# Patient Record
Sex: FEMALE | Race: WHITE | Employment: OTHER | URBAN - METROPOLITAN AREA
[De-identification: names, ages, dates, MRNs, and addresses within clinical notes are randomized per-mention and may not be internally consistent; named-entity substitution may affect disease eponyms.]

---

## 2017-02-10 NOTE — TELEPHONE ENCOUNTER
Pt would like 30 day supply send her RX to  . Sheryl Vasquez 22, she is out of town and she only has a few left. You can reach her at the number she left.

## 2017-02-13 RX ORDER — LEVOTHYROXINE SODIUM 150 UG/1
TABLET ORAL
Qty: 30 TAB | Refills: 3 | Status: CANCELLED | OUTPATIENT
Start: 2017-02-13

## 2017-02-14 RX ORDER — LEVOTHYROXINE SODIUM 150 UG/1
TABLET ORAL
Qty: 30 TAB | Refills: 0 | Status: SHIPPED | OUTPATIENT
Start: 2017-02-14 | End: 2017-03-13 | Stop reason: SDUPTHER

## 2017-02-14 NOTE — TELEPHONE ENCOUNTER
Patient only has medicine through Sunday. She is still out of town and won't be back into town until end of March. She would like a 30 day supply called into [facility] instead of having it mailed.

## 2017-03-13 ENCOUNTER — OFFICE VISIT (OUTPATIENT)
Dept: INTERNAL MEDICINE CLINIC | Age: 82
End: 2017-03-13

## 2017-03-13 ENCOUNTER — HOSPITAL ENCOUNTER (OUTPATIENT)
Dept: LAB | Age: 82
Discharge: HOME | End: 2017-03-13
Payer: MEDICARE

## 2017-03-13 VITALS
BODY MASS INDEX: 33.21 KG/M2 | HEIGHT: 70 IN | HEART RATE: 81 BPM | SYSTOLIC BLOOD PRESSURE: 130 MMHG | RESPIRATION RATE: 12 BRPM | TEMPERATURE: 97.9 F | DIASTOLIC BLOOD PRESSURE: 68 MMHG | WEIGHT: 232 LBS | OXYGEN SATURATION: 96 %

## 2017-03-13 DIAGNOSIS — Z78.0 ASYMPTOMATIC MENOPAUSAL STATE: ICD-10-CM

## 2017-03-13 DIAGNOSIS — Z23 NEED FOR ZOSTER VACCINATION: ICD-10-CM

## 2017-03-13 DIAGNOSIS — E03.9 HYPOTHYROIDISM, UNSPECIFIED TYPE: ICD-10-CM

## 2017-03-13 DIAGNOSIS — G89.29 CHRONIC RIGHT SHOULDER PAIN: ICD-10-CM

## 2017-03-13 DIAGNOSIS — Z00.00 ROUTINE GENERAL MEDICAL EXAMINATION AT A HEALTH CARE FACILITY: ICD-10-CM

## 2017-03-13 DIAGNOSIS — M17.0 OSTEOARTHRITIS OF BOTH KNEES, UNSPECIFIED OSTEOARTHRITIS TYPE: ICD-10-CM

## 2017-03-13 DIAGNOSIS — M25.511 CHRONIC RIGHT SHOULDER PAIN: ICD-10-CM

## 2017-03-13 DIAGNOSIS — Z00.00 MEDICARE ANNUAL WELLNESS VISIT, INITIAL: Primary | ICD-10-CM

## 2017-03-13 DIAGNOSIS — Z13.39 SCREENING FOR ALCOHOLISM: ICD-10-CM

## 2017-03-13 DIAGNOSIS — I50.32 CHRONIC DIASTOLIC HEART FAILURE (HCC): ICD-10-CM

## 2017-03-13 DIAGNOSIS — Z13.31 SCREENING FOR DEPRESSION: ICD-10-CM

## 2017-03-13 DIAGNOSIS — Z71.89 ADVANCED CARE PLANNING/COUNSELING DISCUSSION: ICD-10-CM

## 2017-03-13 PROCEDURE — 84443 ASSAY THYROID STIM HORMONE: CPT

## 2017-03-13 PROCEDURE — 36415 COLL VENOUS BLD VENIPUNCTURE: CPT

## 2017-03-13 PROCEDURE — 80048 BASIC METABOLIC PNL TOTAL CA: CPT

## 2017-03-13 PROCEDURE — 84439 ASSAY OF FREE THYROXINE: CPT

## 2017-03-13 RX ORDER — LEVOTHYROXINE SODIUM 150 UG/1
TABLET ORAL
Qty: 30 TAB | Refills: 0 | Status: SHIPPED | OUTPATIENT
Start: 2017-03-13 | End: 2017-03-23 | Stop reason: SDUPTHER

## 2017-03-13 RX ORDER — DICLOFENAC SODIUM 10 MG/G
4 GEL TOPICAL 4 TIMES DAILY
Qty: 100 G | Refills: 11 | Status: SHIPPED | OUTPATIENT
Start: 2017-03-13 | End: 2018-09-07

## 2017-03-13 NOTE — PROGRESS NOTES
Patient states she is here to follow up on her thyroid. Been on the new dose of 1/2 tab Sundays, one other days for about 6 weeks. Got letter late. Needs a refill. Still with right shoulder pain and feet edema.

## 2017-03-13 NOTE — PATIENT INSTRUCTIONS
Medicare Part B Preventive Services Guidelines/Limitations Date last completed and Frequency Due Date   Bone Mass Measurement  (age 72 & older, biennial) Requires diagnosis related to osteoporosis or estrogen deficiency. Biennial benefit unless patient has history of long-term glucocorticoid tx or baseline is needed because initial test was by other method As recommended by your PCP or Specialist    Recommended every 2 years As recommended by your PCP or Specialist     Cardiovascular Screening Blood Tests (every 5 years)  Total cholesterol, HDL, Triglycerides Order as a panel if possible Completed 11/2016    As recommended by your PCP As recommended by your PCP or Specialist   Colorectal Cancer Screening  -Fecal occult blood test (annual)  -Flexible sigmoidoscopy (5y)  -Screening colonoscopy (10y)  -Barium Enema Age 49-80; After age [de-identified] if history of abnormal results As recommended by your PCP or Specialist       As recommended by your PCP or Specialist     Counseling to Prevent Tobacco Use (up to 8 sessions per year)  - Counseling greater than 3 and up to 10 minutes  - Counseling greater than 10 minutes Patients must be asymptomatic of tobacco-related conditions to receive as preventive service N/A N/A   Diabetes Screening Tests (at least every 3 years, Medicare covers annually or at 6-month intervals for prediabetic patients)    Fasting blood sugar (FBS) or glucose tolerance test (GTT) Patient must be diagnosed with one of the following:  -Hypertension, Dyslipidemia, obesity, previous impaired FBS or GTT  Or any two of the following: overweight, FH of diabetes, age ? 72, history of gestational diabetes, birth of baby weighing more than 9 pounds Completed 11/2016    Recommended every 3 years for non-diabetics     As recommended by your PCP or Specialist     Glaucoma Screening (no USPSTF recommendation) Diabetes mellitus, family history, , age 48 or over,  American, age 72 or over Completed 10/2016    Recommended annually As recommended by your PCP or Specialist   Seasonal Influenza Vaccination (annually)  Completed 11/2016    Recommended Annually Completed for 2016 flu season   TDAP Vaccination  As recommended by your PCP or Specialist     Recommended every 10 years As recommended by your PCP or Specialist   Zoster (Shingles) Vaccination Covered by Medicare Part D through the pharmacy- PCP provides prescription Never received    Recommended once over age 48  As recommended by your PCP or Specialist   Pneumococcal Vaccination (once after 72)  Pneumo 23- 10/2009  Recommended once over the age of 72    Prevnar 15- 12/2015 Recommended once over the age of 72 Complete        Complete   Screening Mammography (biennial age 54-69) Annually (age 36 or over) As recommended by your PCP or Specialist   As recommended by your PCP or Specialist     Screening Pap Tests and Pelvic Examination (up to age 79 and after 79 if unknown history or abnormal study last 8 years) Every 25 months except high risk As recommended by your PCP or Specialist   As recommended by your PCP or Specialist     Ultrasound Screening for Abdominal Aortic Aneurysm (AAA) (once) Patient must be referred through IPPE and not have had a screening for abdominal aortic aneurysm before under Medicare. Limited to patients who meet one of the following criteria:  - Men who are 73-68 years old and have smoked more than 100 cigarettes in their lifetime.  -Anyone with a FH of AAA  -Anyone recommended for screening by USPSTF Not indicated unless recommended by PCP   Not indicated unless recommended by PCP     Family Practice Management 2011    Please bring a copy of your completed advance medical directive to the office so it may be added to your medical record. Thank you. If you have any questions or concerns please feel free to contact me at [phone]. It was a pleasure meeting you today and participating in your healthcare.   Sarita Moore RN

## 2017-03-13 NOTE — PROGRESS NOTES
Nurse Navigator Medicare Wellness Visit performed by ELÍAS Xavier    This is an Initial Leon Exam (AWV) (Performed 12 months after IPPE or effective date of Medicare Part B enrollment, Once in a lifetime)    I have reviewed the patient's medical history in detail and updated the computerized patient record. History     Past Medical History:   Diagnosis Date    Anemia     hx b12 def age 22-31s    COPD (chronic obstructive pulmonary disease) ([facility] [address] 7/2011    FEV1 1 L 7/2011. Dr. Mihaela Brown Diastolic dysfunction 1/23/2400    GERD (gastroesophageal reflux disease)     Glaucoma suspect of both eyes     Dr Mahin Rodas    Hemorrhoids     Migraine     Mixed stress and urge urinary incontinence     OA (osteoarthritis) of knee     R, Dr. Blair Box. Euflexa injections x3. 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 Jessica Elias Paresthesia of foot, bilateral     Psoriasis     left ear    PSVT (paroxysmal supraventricular tachycardia) ([facility] [address] 5/19/2012    Dr. Teresa Lopez Pulmonary nodules 7/2011    RLL x2.  stable 2/2012  Dr. 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  Dr. Drake Mandel    menorrhagia    HX OTHER SURGICAL      Echo 7/31/14 - LVEF 60-65%, grade 1 DD    HX TONSILLECTOMY      NM CARDIAC SPECT W STRS/REST MULT  7/18/11    small sized fixed defect in inferolateral wall may represent scar or abd attenuation; EF 80%    VAS LOWER EXT ART PVR MULT LEVEL SEG PRESSURES  7/18/11    normal Current Outpatient Prescriptions   Medication Sig Dispense Refill    diclofenac (VOLTAREN) 1 % gel Apply 4 g to affected area four (4) times daily. 100 g 11    levothyroxine (SYNTHROID) 150 mcg tablet TAKE 1 TABLET BY MOUTH 6 days per week and 1/2 pill on Sundays only 30 Tab 0    varicella zoster vacine live (VARICELLA-ZOSTER VACINE LIVE) 19,400 unit/0.65 mL susr injection 1 Vial by SubCUTAneous route once for 1 dose. 0.65 mL 0    metoprolol succinate (TOPROL-XL) 50 mg XL tablet Take 1 Tab by mouth daily. 90 Tab 1    torsemide (DEMADEX) 10 mg tablet Take 1 Tab by mouth daily. 90 Tab 1    nystatin (MYCOSTATIN) powder Apply once daily to rash of abdomen 30 g 5    budesonide-formoterol (SYMBICORT) 160-4.5 mcg/actuation HFA inhaler Take 2 Puffs by inhalation two (2) times a day. 3 Inhaler 5    docusate sodium (COLACE) 100 mg capsule Take 1 capsule by mouth two (2) times daily as needed for Constipation. 60 capsule 2    hydrocortisone (SCALPICIN) 1 % soln Apply daily 105 mL 5    albuterol (PROVENTIL HFA) 90 mcg/actuation inhaler Take 2 Puffs by inhalation every four (4) hours as needed for Wheezing. 1 Inhaler 5    ANTIOX #8/OM3/DHA/EPA/LUT/ZEAX (PRESERVISION AREDS 2 PO) Take  by mouth.  triamcinolone acetonide (KENALOG) 0.1 % topical cream Apply  to affected area two (2) times a day. use thin layer 30 g 3    aspirin delayed-release (ASPIR-81) 81 mg tablet Take  by mouth daily.        Allergies   Allergen Reactions    Ciprofloxacin (Bulk) Rash    Diltiazem Rash    Piperacillin Other (comments)    Tazobactam Other (comments)     Family History   Problem Relation Age of Onset    Cancer Maternal Grandmother      breast    Cancer Sister      lymphopma    Heart Disease Father     Heart Disease Paternal Grandfather     Seizures Sister     Arthritis-osteo Mother     Dementia Mother     Malignant Hyperthermia Neg Hx     Pseudocholinesterase Deficiency Neg Hx     Delayed Awakening Neg Hx     Post-op Nausea/Vomiting Neg Hx     Emergence Delirium Neg Hx     Post-op Cognitive Dysfunction Neg Hx     Other Neg Hx      Social History   Substance Use Topics    Smoking status: Former Smoker     Packs/day: 1.00     Years: 60.00     Types: Cigarettes     Quit date: 8/27/2011    Smokeless tobacco: Never Used    Alcohol use No     Patient Active Problem List   Diagnosis Code    Hypothyroidism E03.9    OA (osteoarthritis) of knee M17.9    Mixed stress and urge urinary incontinence N39.46    Tobacco use disorder F17.200    Psoriasis L40.9    Pulmonary nodules R91.8    Malaise R53.81    Anemia, unspecified D64.9    Chronic airway obstruction, not elsewhere classified ([facility] [address] J44.9    Glaucoma H40.9    Depressive disorder, not elsewhere classified F32.9    Migraine G43.909    Diverticulitis of colon (without mention of hemorrhage) K57.32    CKD (chronic kidney disease), stage III N18.3    PSVT (paroxysmal supraventricular tachycardia) ([facility]) I47.1    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 Patient states that they have a strong support system within their family & friends. PHQ 2 / 9, over the last two weeks 3/13/2017   Little interest or pleasure in doing things Not at all   Feeling down, depressed or hopeless Not at all   Total Score PHQ 2 0     Alcohol Risk Factor Screening: On any occasion during the past 3 months, have you had more than 3 drinks containing alcohol? No    Do you average more than 7 drinks per week? No    Functional Ability and Level of Safety:     Hearing Loss   normal-to-mild    Activities of Daily Living   Self-care.  Patient states that she lives alone in a private residence & she is independent in all ADL's. Patient shares that her sister & family live locally, but her own daughter & grandsons live in [place] & she attempts to visit with them as often as she can. Patient was pleased to share that she stayed with her daughter from Thanksgiving through January which was a good visit but very eventful. Patient ambulates with the assistance of a rolling walker or rollator. Patient states that she often uses her rollator as a seat in the kitchen while preparing meals or washing dishes. Patient states that she has knee trouble & gets swelling in her feet, so she tries to limit her time standing. Patient tries to stay active but cleaning her house, but states that she gets easily fatigued which she feels is due to some of her cardiac medications & her thyroid disorder. Patient states that the fatigue has been chronic & PCP is aware. Patient also shares that she is experiencing right shoulder pain. PCP notified & today, PCP provided patient with a referral to an orthopedic physician for evaluation of the shoulder. Requires assistance with:   ADL Assessment 3/13/2017   Feeding yourself No Help Needed   Getting from bed to chair No Help Needed   Getting dressed No Help Needed   Bathing or showering No Help Needed   Walk across the room (includes cane/walker) No Help Needed   Using the telphone No Help Needed   Taking your medications No Help Needed   Preparing meals No Help Needed   Managing money (expenses/bills) No Help Needed   Moderately strenuous housework (laundry) No Help Needed   Shopping for personal items (toiletries/medicines) No Help Needed   Shopping for groceries No Help Needed   Driving No Help Needed   Climbing a flight of stairs No Help Needed   Getting to places beyond walking distances No Help Needed       Fall Risk   Patient denies falls within the past year & verbalizes awareness of fall prevention strategies. Fall Risk Assessment, last 12 mths 3/13/2017   Able to walk?  Yes   Fall in past 12 months? No   Fall with injury? -     Abuse Screen   Patient is not abused    Review of Systems   Medicare Wellness Visit    Physical Examination     No exam data present    Evaluation of Cognitive Function:  Mood/affect:  happy  Appearance: age appropriate and casually dressed  Family member/caregiver input: None present; however, patient reports a strong support system. No exam performed today, Medicare Wellness Visit. Patient Care Team:  Hussein Rubio MD as PCP - General (Internal Medicine)  Marge Dumont MD (Cardiology)    Advice/Referrals/Counseling   Education and counseling provided:  End-of-Life planning (with patient's consent)  Pneumococcal Vaccine  Influenza Vaccine  Screening Mammography  Screening Pap and pelvic (covered once every 2 years)  Colorectal cancer screening tests  Bone mass measurement (DEXA)  Screening for glaucoma  tdap & shingles vaccinations      Assessment/Plan   1. Patient states that a completed Advanced Medical Directive is at home. NN encouraged patient to bring a copy of the completed Advanced Medical Directive to the office for scanning into the medical record. Patient verbalized understanding & agreement. 2. Patient is up to date on the following immunizations: flu vaccine (admin 11/2016), pneumonia 23 vaccine (admin 10/2009) & prevnar 13 vaccine (admin 12/2015). Patient confirmed the aforementioned preventative immunization dates are correct. Patient is unable to recall the date of their last tdap vaccine. NN encouraged patient to check home records & if information obtained, to please notify PCP's office with the details. Patient verbalized agreement. Patient denies receiving a shingles vaccine in the past & patient denies ever having a case of shingles in the past. Today, PCP provided patient with a prescription for the shingles vaccine. Patient's health maintenance immunization record has been updated & is current.      3. Due to the patient's age, screening mammograms (report on file from 10/2014) & screening colonoscopies are no longer indicated unless recommended by PCP or a specialist. Patient denies having a screening dexa scan in her past. Today, PCP provided patient with an order for a screening dexa scan. Patient inquiring about the health benefits of a [facility] Screening appointment - PCP notified to help patient determine if this would be beneficial.     4. Patient was not wearing corrective lenses. Patient reports having a routine eye exam & glaucoma screening within the last year (10/2016) performed by Dr. Ellsi Ayala. [facility] faxed requesting a copy of patient's last eye exam with glaucoma screening with patient's verbal approval.     5. Please see ADL screening section for additional information. Patient verbalized understanding of all information discussed. Patient was given the opportunity to ask questions. Medication reconciliation completed by MA/ LPN and reviewed by PCP. Patient provided AVS which includes Medicare Wellness Preventative Screening Table.

## 2017-03-13 NOTE — MR AVS SNAPSHOT
Visit Information Date & Time Provider Department Dept. Phone Encounter #  
 3/13/2017  1:30 PM Isiah Wary MD [facility] [address]  [id number] Your Appointments 5/4/2017  1:00 PM  
ESTABLISHED PATIENT with Loretta Corona MD  
[facility] ([address]) Appt Note: annual check 320 [facility] 600 1007 [facility]  
[address] [facility] [address] [address] Upcoming Health Maintenance Date Due DTaP/Tdap/Td series (1 - Tdap) 5/28/1953 GLAUCOMA SCREENING Q2Y 5/14/2016 MEDICARE YEARLY EXAM 3/14/2018 Allergies as of 3/13/2017  Review Complete On: 3/13/2017 By: Rolando Tran Severity Noted Reaction Type Reactions Ciprofloxacin (Bulk)  05/07/2012    Rash Diltiazem  06/15/2012    Rash Piperacillin  04/26/2013    Other (comments) Tazobactam  04/26/2013    Other (comments) Current Immunizations  Reviewed on 3/13/2017 Name Date Influenza High Dose Vaccine PF 11/17/2016, 12/16/2015 Influenza Vaccine 9/13/2014, 9/15/2013 Pneumococcal Conjugate (PCV-13) 12/16/2015 Pneumococcal Vaccine (Unspecified Type) 10/1/2009 Reviewed by Isiah Wray MD on 3/13/2017 at  2:17 PM  
You Were Diagnosed With   
  
 Codes Comments Hypothyroidism, unspecified type    -  Primary ICD-10-CM: E03.9 ICD-9-CM: 244.9 Osteoarthritis of both knees, unspecified osteoarthritis type     ICD-10-CM: M17.0 ICD-9-CM: 715.96 Chronic right shoulder pain     ICD-10-CM: M25.511, G89.29 ICD-9-CM: 719.41, 338.29 Need for zoster vaccination     ICD-10-CM: J03 ICD-9-CM: V04.89 Chronic diastolic heart failure (HCC)     ICD-10-CM: I50.32 
ICD-9-CM: 428.32 Asymptomatic menopausal state     ICD-10-CM: Z78.0 ICD-9-CM: V49.81 Vitals BP Pulse Temp Resp Height(growth percentile) Weight(growth percentile) 130/68 (BP 1 Location: Left arm, BP Patient Position: Sitting) 81 97.9 °F (36.6 °C) 12 5' 10\" (1.778 m) 232 lb (105.2 kg) SpO2 BMI OB Status Smoking Status 96% 33.29 kg/m2 Hysterectomy Former Smoker Vitals History BMI and BSA Data Body Mass Index Body Surface Area  
 33.29 kg/m 2 2.28 m 2 Preferred Pharmacy Pharmacy Name Phone [facility]/PHARMACY #[id number] - [place], [place] - [address] [address] [phone] Your Updated Medication List  
  
   
This list is accurate as of: 3/13/17  2:29 PM.  Always use your most recent med list.  
  
  
  
  
 albuterol 90 mcg/actuation inhaler Commonly known as:  PROVENTIL HFA Take 2 Puffs by inhalation every four (4) hours as needed for Wheezing. ASPIR-81 81 mg tablet Generic drug:  aspirin delayed-release Take  by mouth daily. budesonide-formoterol 160-4.5 mcg/actuation HFA inhaler Commonly known as:  SYMBICORT Take 2 Puffs by inhalation two (2) times a day. diclofenac 1 % Gel Commonly known as:  VOLTAREN Apply 4 g to affected area four (4) times daily. docusate sodium 100 mg capsule Commonly known as:  Clydie Gaxiola Take 1 capsule by mouth two (2) times daily as needed for Constipation. hydrocortisone 1 % Soln Commonly known as:  SCALPICIN ANTI-ITCH Apply daily  
  
 levothyroxine 150 mcg tablet Commonly known as:  SYNTHROID  
TAKE 1 TABLET BY MOUTH 6 days per week and 1/2 pill on Sundays only  
  
 metoprolol succinate 50 mg XL tablet Commonly known as:  TOPROL-XL Take 1 Tab by mouth daily. nystatin powder Commonly known as:  MYCOSTATIN Apply once daily to rash of abdomen PRESERVISION AREDS 2 PO Take  by mouth. torsemide 10 mg tablet Commonly known as:  DEMADEX Take 1 Tab by mouth daily. triamcinolone acetonide 0.1 % topical cream  
Commonly known as:  KENALOG Apply  to affected area two (2) times a day. use thin layer varicella zoster vacine live 19,400 unit/0.65 mL Susr injection Commonly known as:  varicella-zoster vacine live 1 Vial by SubCUTAneous route once for 1 dose. Prescriptions Printed Refills  
 levothyroxine (SYNTHROID) 150 mcg tablet 0 Sig: TAKE 1 TABLET BY MOUTH 6 days per week and 1/2 pill on Sundays only Class: Print  
 varicella zoster vacine live (VARICELLA-ZOSTER VACINE LIVE) 19,400 unit/0.65 mL susr injection 0 Si Vial by SubCUTAneous route once for 1 dose. Class: Print Route: SubCUTAneous Prescriptions Sent to Pharmacy Refills  
 diclofenac (VOLTAREN) 1 % gel 11 Sig: Apply 4 g to affected area four (4) times daily. Class: Normal  
 Pharmacy: [facility]/pharmacy #[id number] - [place], [place] - [address] [address] [facility] [address]  #: [phone] Route: Topical  
  
We Performed the Following METABOLIC PANEL, BASIC [09216 CPT(R)] REFERRAL TO ORTHOPEDICS [SQB353 Custom] Comments:  
 Please evaluate patient for chronic right shoulder pains Joseealondra Rossi T4, FREE [id number] CPT(R)] TSH 3RD GENERATION [41423 CPT(R)] To-Do List   
 2017 Imaging:  DEXA BONE DENSITY STUDY AXIAL Referral Information Referral ID Referred By Referred To  
  
 [id number] [address] Phone: [phone] Fax: [phone] Visits Status Start Date End Date 1 New Request 3/13/17 3/13/18 If your referral has a status of pending review or denied, additional information will be sent to support the outcome of this decision. Referral ID Referred By Referred To  
 [id number] Frandy Waterman Not Available Visits Status Start Date End Date 1 New Request 3/13/17 3/13/18 If your referral has a status of pending review or denied, additional information will be sent to support the outcome of this decision. Patient Instructions Medicare Part B Preventive Services Guidelines/Limitations Date last completed and Frequency Due Date Bone Mass Measurement 
(age 72 & older, biennial) Requires diagnosis related to osteoporosis or estrogen deficiency. Biennial benefit unless patient has history of long-term glucocorticoid tx or baseline is needed because initial test was by other method As recommended by your PCP or Specialist 
 
Recommended every 2 years As recommended by your PCP or Specialist 
  
Cardiovascular Screening Blood Tests (every 5 years) Total cholesterol, HDL, Triglycerides Order as a panel if possible Completed 11/2016 As recommended by your PCP As recommended by your PCP or Specialist  
Colorectal Cancer Screening 
-Fecal occult blood test (annual) -Flexible sigmoidoscopy (5y) 
-Screening colonoscopy (10y) -Barium Enema Age 49-80; After age [de-identified] if history of abnormal results As recommended by your PCP or Specialist 
  
  As recommended by your PCP or Specialist 
  
Counseling to Prevent Tobacco Use (up to 8 sessions per year) - Counseling greater than 3 and up to 10 minutes - Counseling greater than 10 minutes Patients must be asymptomatic of tobacco-related conditions to receive as preventive service N/A N/A Diabetes Screening Tests (at least every 3 years, Medicare covers annually or at 6-month intervals for prediabetic patients) Fasting blood sugar (FBS) or glucose tolerance test (GTT) Patient must be diagnosed with one of the following: 
-Hypertension, Dyslipidemia, obesity, previous impaired FBS or GTT 
Or any two of the following: overweight, FH of diabetes, age ? 72, history of gestational diabetes, birth of baby weighing more than 9 pounds Completed 11/2016 Recommended every 3 years for non-diabetics  As recommended by your PCP or Specialist 
  
Glaucoma Screening (no USPSTF recommendation) Diabetes mellitus, family history, , age 48 or over,  American, age 72 or over Completed 10/2016 Recommended annually As recommended by your PCP or Specialist  
Seasonal Influenza Vaccination (annually)  Completed 11/2016 Recommended Annually Completed for 2016 flu season TDAP Vaccination  As recommended by your PCP or Specialist  
 
Recommended every 10 years As recommended by your PCP or Specialist  
Zoster (Shingles) Vaccination Covered by Medicare Part D through the pharmacy- PCP provides prescription Never received Recommended once over age 48  As recommended by your PCP or Specialist  
Pneumococcal Vaccination (once after 65)  Pneumo 23- 10/2009 Recommended once over the age of 72 Prevnar 13- 12/2015 Recommended once over the age of 72 Complete Complete Screening Mammography (biennial age 54-69) Annually (age 36 or over) As recommended by your PCP or Specialist 
 As recommended by your PCP or Specialist 
  
Screening Pap Tests and Pelvic Examination (up to age 79 and after 79 if unknown history or abnormal study last 8 years) Every 25 months except high risk As recommended by your PCP or Specialist 
 As recommended by your PCP or Specialist 
  
Ultrasound Screening for Abdominal Aortic Aneurysm (AAA) (once) Patient must be referred through IPPE and not have had a screening for abdominal aortic aneurysm before under Medicare. Limited to patients who meet one of the following criteria: 
- Men who are 73-68 years old and have smoked more than 100 cigarettes in their lifetime. 
-Anyone with a FH of AAA 
-Anyone recommended for screening by USPSTF Not indicated unless recommended by PCP Not indicated unless recommended by PCP Family Practice Management 2011 Please bring a copy of your completed advance medical directive to the office so it may be added to your medical record. Thank you. If you have any questions or concerns please feel free to contact me at [phone]. It was a pleasure meeting you today and participating in your healthcare. Elyse Mendosa RN Introducing hospitals & HEALTH SERVICES! Dear Rafal Pierce: Thank you for requesting a [employer] account. Our records indicate that you have previously registered for a [employer] account but its currently inactive. Please call our [employer] support line at [phone]. Additional Information If you have questions, please visit the Frequently Asked Questions section of the [employer] website at [email or web address] [email or web address] Remember, [employer] is NOT to be used for urgent needs. For medical emergencies, dial 911. Now available from your iPhone and Android! Please provide this summary of care documentation to your next provider. Your primary care clinician is listed as Kael Hilario. If you have any questions after today's visit, please call [phone].

## 2017-03-13 NOTE — PROGRESS NOTES
HISTORY OF PRESENT ILLNESS    Chief Complaint   Patient presents with    Thyroid Problem    Annual Wellness Visit       Presents for follow-up    Hypothyroidism follow-up  Reports mild fatigue  denies heat/cold intolerance, bowel/skin changes or CVS symptoms, losing hair, feeling excessive energy, tremulousness, palpitations. Thyroid medication has been decreased around 2/5/17 (late) since last medication check and labs. Lab Results   Component Value Date/Time    TSH 0.227 11/17/2016 02:09 PM    T4, Free 2.02 11/17/2016 02:09 PM       Lost weight- not intentional  Wt Readings from Last 3 Encounters:   03/13/17 232 lb (105.2 kg)   11/17/16 243 lb (110.2 kg)   04/21/16 245 lb 9.6 oz (111.4 kg)     C/o mild edema of legs - stable    C/o right shoulder pains. Injection 11/2016 helped. Review of Systems   All other systems reviewed and are negative, except as noted in HPI    Past Medical and Surgical History   has a past medical history of Anemia; COPD (chronic obstructive pulmonary disease) ([facility] [address] [facility] (7/2011); Diastolic dysfunction (5/76/7562); GERD (gastroesophageal reflux disease); Glaucoma suspect of both eyes; Hemorrhoids; Migraine; Mixed stress and urge urinary incontinence; OA (osteoarthritis) of knee; Other postablative hypothyroidism (1987); Paresthesia of foot, bilateral; Psoriasis; PSVT (paroxysmal supraventricular tachycardia) ([facility] [address] (5/19/2012); Pulmonary nodules (7/2011); Sepsis(995.91) (5/14/2012); Tobacco use disorder; 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 has a past surgical history that includes tonsillectomy; hysterectomy (1974); cataract removal; echo 2d adult (7/15/11); nm cardiac spect w strs/rest mult (7/18/11); vas lower ext art pvr mult level seg pressures (7/18/11); echo 2d adult (5/19/12); colonoscopy (6/28/12); and other surgical.     reports that she quit smoking about 5 years ago.  Her smoking use included Cigarettes. She has a 60.00 pack-year smoking history. She has never used smokeless tobacco. She reports that she does not drink alcohol or use illicit drugs. family history includes Arthritis-osteo in her mother; Cancer in her maternal grandmother and sister; Dementia in her mother; Heart Disease in her father and paternal grandfather; Seizures in her sister. There is no history of Malignant Hyperthermia, Pseudocholinesterase Deficiency, Delayed Awakening, Post-op Nausea/Vomiting, Emergence Delirium, Post-op Cognitive Dysfunction, or Other. Physical Exam   Nursing note and vitals reviewed. Blood pressure 130/68, pulse 81, temperature 97.9 °F (36.6 °C), resp. rate 12, height 5' 10\" (1.778 m), weight 232 lb (105.2 kg), SpO2 96 %. Constitutional:  No distress. Eyes: Conjunctivae are normal.   Ears:  Hearing grossly intact  Cardiovascular: Normal rate. regular rhythm, no murmurs or gallops  No edema  Pulmonary/Chest: Effort normal.   CTAB  Musculoskeletal: moves all 4 extremities   Neurological: Alert and oriented to person, place, and time. Skin: No rash noted. Psychiatric: Normal mood and affect. Behavior is normal.     ASSESSMENT and Severiano Nely was seen today for thyroid problem and annual wellness visit. Diagnoses and all orders for this visit:    Hypothyroidism, unspecified type- on lower dose for 6 weeks. -     levothyroxine (SYNTHROID) 150 mcg tablet; TAKE 1 TABLET BY MOUTH 6 days per week and 1/2 pill on Sundays only  -     TSH 3RD GENERATION  -     T4, FREE    Osteoarthritis of both knees, unspecified osteoarthritis type - borderline controlled  -     diclofenac (VOLTAREN) 1 % gel; Apply 4 g to affected area four (4) times daily.     Chronic right shoulder pain -  May consider consultation.    -     REFERRAL TO ORTHOPEDICS    Need for zoster vaccination  -     varicella zoster vacine live (VARICELLA-ZOSTER VACINE LIVE) 19,400 unit/0.65 mL susr injection; 1 Vial by SubCUTAneous route once for 1 dose. Chronic diastolic heart failure (HCC) - stable. Mild edema  -     METABOLIC PANEL, BASIC    Asymptomatic menopausal state   -     DEXA BONE DENSITY STUDY AXIAL; Future            lab results and schedule of future lab studies reviewed with patient  reviewed medications and side effects in detail    Return to clinic for further evaluation if new symptoms develop    Follow-up Disposition: Not on File    Current Outpatient Prescriptions   Medication Sig    levothyroxine (SYNTHROID) 150 mcg tablet TAKE 1 TABLET BY MOUTH DAILY (BEFORE BREAKFAST). DECREASED DOSE 10/15/14    metoprolol succinate (TOPROL-XL) 50 mg XL tablet Take 1 Tab by mouth daily.  torsemide (DEMADEX) 10 mg tablet Take 1 Tab by mouth daily.  diclofenac (VOLTAREN) 1 % topical gel Apply 4 g to affected area four (4) times daily.  nystatin (MYCOSTATIN) powder Apply once daily to rash of abdomen    budesonide-formoterol (SYMBICORT) 160-4.5 mcg/actuation HFA inhaler Take 2 Puffs by inhalation two (2) times a day.  docusate sodium (COLACE) 100 mg capsule Take 1 capsule by mouth two (2) times daily as needed for Constipation.  hydrocortisone (SCALPICIN) 1 % soln Apply daily    albuterol (PROVENTIL HFA) 90 mcg/actuation inhaler Take 2 Puffs by inhalation every four (4) hours as needed for Wheezing.  ANTIOX #8/OM3/DHA/EPA/LUT/ZEAX (PRESERVISION AREDS 2 PO) Take  by mouth.  triamcinolone acetonide (KENALOG) 0.1 % topical cream Apply  to affected area two (2) times a day. use thin layer    aspirin delayed-release (ASPIR-81) 81 mg tablet Take  by mouth daily. No current facility-administered medications for this visit.

## 2017-03-14 LAB
BUN SERPL-MCNC: 17 MG/DL (ref 8–27)
BUN/CREAT SERPL: 15 (ref 11–26)
CALCIUM SERPL-MCNC: 9.5 MG/DL (ref 8.7–10.3)
CHLORIDE SERPL-SCNC: 104 MMOL/L (ref 96–106)
CO2 SERPL-SCNC: 24 MMOL/L (ref 18–29)
CREAT SERPL-MCNC: 1.15 MG/DL (ref 0.57–1)
GLUCOSE SERPL-MCNC: 90 MG/DL (ref 65–99)
INTERPRETATION: NORMAL
POTASSIUM SERPL-SCNC: 4.8 MMOL/L (ref 3.5–5.2)
SODIUM SERPL-SCNC: 145 MMOL/L (ref 134–144)
T4 FREE SERPL-MCNC: 1.59 NG/DL (ref 0.82–1.77)
TSH SERPL DL<=0.005 MIU/L-ACNC: 0.56 UIU/ML (ref 0.45–4.5)

## 2017-03-17 ENCOUNTER — TELEPHONE (OUTPATIENT)
Dept: INTERNAL MEDICINE CLINIC | Age: 82
End: 2017-03-17

## 2017-03-17 NOTE — TELEPHONE ENCOUNTER
Pt wanting to know if her thyroid med dose needs to be changed based off her most recent labs [phone]

## 2017-03-20 ENCOUNTER — HOSPITAL ENCOUNTER (OUTPATIENT)
Dept: MAMMOGRAPHY | Age: 82
Discharge: HOME | End: 2017-03-20
Attending: INTERNAL MEDICINE
Payer: MEDICARE

## 2017-03-20 DIAGNOSIS — Z78.0 ASYMPTOMATIC MENOPAUSAL STATE: ICD-10-CM

## 2017-03-20 PROCEDURE — 77080 DXA BONE DENSITY AXIAL: CPT

## 2017-03-21 ENCOUNTER — TELEPHONE (OUTPATIENT)
Dept: INTERNAL MEDICINE CLINIC | Age: 82
End: 2017-03-21

## 2017-03-21 NOTE — TELEPHONE ENCOUNTER
Mable Cushing from [facility] would like a call back in regards to the patient synthroid. She wanted to verify that the patient is still on 150mcg or does it need to change and also the patient needs a 90 day prescription.   [phone]

## 2017-03-23 DIAGNOSIS — E03.9 HYPOTHYROIDISM, UNSPECIFIED TYPE: ICD-10-CM

## 2017-03-23 RX ORDER — LEVOTHYROXINE SODIUM 150 MCG
TABLET ORAL
Qty: 90 TAB | Refills: 3 | Status: SHIPPED | OUTPATIENT
Start: 2017-03-23 | End: 2018-01-26 | Stop reason: SDUPTHER

## 2017-03-23 NOTE — TELEPHONE ENCOUNTER
Spoke with pt on Friday, read letter from PCP as she had not received results as of yet, pt was advised per PCP to continue with same dose of  Synthroid as written , she verbalized she understood and script was sent to pharmacy,  Today repeated same as above, read letter again, does request she get Brand Synthroid instead of Levothyroxine, this will be resent to pharmacy  Per her request also requesting # 90 w/3 refills. Done.  Leaving town in the AM.

## 2017-05-04 ENCOUNTER — OFFICE VISIT (OUTPATIENT)
Dept: CARDIOLOGY CLINIC | Age: 82
End: 2017-05-04

## 2017-05-04 VITALS
HEIGHT: 70 IN | OXYGEN SATURATION: 92 % | HEART RATE: 80 BPM | BODY MASS INDEX: 32.16 KG/M2 | DIASTOLIC BLOOD PRESSURE: 70 MMHG | WEIGHT: 224.6 LBS | SYSTOLIC BLOOD PRESSURE: 130 MMHG | RESPIRATION RATE: 16 BRPM

## 2017-05-04 DIAGNOSIS — N18.30 CKD (CHRONIC KIDNEY DISEASE), STAGE III (HCC): ICD-10-CM

## 2017-05-04 DIAGNOSIS — I87.2 VENOUS INSUFFICIENCY: ICD-10-CM

## 2017-05-04 DIAGNOSIS — E78.5 DYSLIPIDEMIA: ICD-10-CM

## 2017-05-04 DIAGNOSIS — I50.32 CHRONIC DIASTOLIC HEART FAILURE (HCC): Primary | ICD-10-CM

## 2017-05-04 DIAGNOSIS — I47.1 PSVT (PAROXYSMAL SUPRAVENTRICULAR TACHYCARDIA) (HCC): ICD-10-CM

## 2017-05-04 RX ORDER — TORSEMIDE 10 MG/1
10 TABLET ORAL DAILY
Qty: 90 TAB | Refills: 3 | Status: SHIPPED | OUTPATIENT
Start: 2017-05-04 | End: 2018-08-20 | Stop reason: SDUPTHER

## 2017-05-04 RX ORDER — METOPROLOL SUCCINATE 50 MG/1
50 TABLET, EXTENDED RELEASE ORAL DAILY
Qty: 90 TAB | Refills: 3 | Status: SHIPPED | OUTPATIENT
Start: 2017-05-04 | End: 2018-05-24 | Stop reason: SDUPTHER

## 2017-05-04 NOTE — MR AVS SNAPSHOT
Visit Information Date & Time Provider Department Dept. Phone Encounter #  
 5/4/2017  1:00 PM Az Olmstead MD [facility]  [phone] [id number] Your Appointments 5/24/2018  1:20 PM  
ESTABLISHED PATIENT with Az Olmstead MD  
[facility] ([facility]) Appt Note: annual  
 [address] [facility] [address] [address] 1007 [facility]  
[address] [facility] [address] [address] Upcoming Health Maintenance Date Due DTaP/Tdap/Td series (1 - Tdap) 5/28/1953 INFLUENZA AGE 9 TO ADULT 8/1/2017 MEDICARE YEARLY EXAM 3/14/2018 GLAUCOMA SCREENING Q2Y 10/7/2018 Allergies as of 5/4/2017  Review Complete On: 5/4/2017 By: Az Olmstead MD  
  
 Severity Noted Reaction Type Reactions Ciprofloxacin (Bulk)  05/07/2012    Rash Diltiazem  06/15/2012    Rash Piperacillin  04/26/2013    Other (comments) Tazobactam  04/26/2013    Other (comments) Current Immunizations  Reviewed on 3/13/2017 Name Date Influenza High Dose Vaccine PF 11/17/2016, 12/16/2015 Influenza Vaccine 9/13/2014, 9/15/2013 Pneumococcal Conjugate (PCV-13) 12/16/2015 Pneumococcal Vaccine (Unspecified Type) 10/1/2009 Zoster Vaccine, Live 3/13/2017 Not reviewed this visit You Were Diagnosed With   
  
 Codes Comments Chronic diastolic heart failure (HCC)    -  Primary ICD-10-CM: I50.32 
ICD-9-CM: 428.32   
 PSVT (paroxysmal supraventricular tachycardia) (HCC)     ICD-10-CM: I47.1 ICD-9-CM: 427.0 Venous insufficiency     ICD-10-CM: I87.2 ICD-9-CM: 459.81   
 CKD (chronic kidney disease), stage III     ICD-10-CM: N18.3 ICD-9-CM: 977. 3 Dyslipidemia     ICD-10-CM: E78.5 ICD-9-CM: 272.4 Vitals  BP Pulse Resp Height(growth percentile) Weight(growth percentile) SpO2  
 130/70 (BP 1 Location: Left arm, BP Patient Position: Sitting) 80 16 5' 10\" (1.778 m) 224 lb 9.6 oz (101.9 kg) 92% BMI OB Status Smoking Status 32.23 kg/m2 Hysterectomy Former Smoker BMI and BSA Data Body Mass Index Body Surface Area  
 32.23 kg/m 2 2.24 m 2 Preferred Pharmacy Pharmacy Name Phone [facility]/PHARMACY #[id number] - [place], [place] - [address] [address] [phone] Your Updated Medication List  
  
   
This list is accurate as of: 5/4/17  1:59 PM.  Always use your most recent med list.  
  
  
  
  
 albuterol 90 mcg/actuation inhaler Commonly known as:  PROVENTIL HFA Take 2 Puffs by inhalation every four (4) hours as needed for Wheezing. ASPIR-81 81 mg tablet Generic drug:  aspirin delayed-release Take  by mouth daily. budesonide-formoterol 160-4.5 mcg/actuation HFA inhaler Commonly known as:  SYMBICORT Take 2 Puffs by inhalation two (2) times a day. diclofenac 1 % Gel Commonly known as:  VOLTAREN Apply 4 g to affected area four (4) times daily. docusate sodium 100 mg capsule Commonly known as:  [place] Angle Take 1 capsule by mouth two (2) times daily as needed for Constipation. hydrocortisone 1 % Soln Commonly known as:  SCALPICIN ANTI-ITCH Apply daily  
  
 metoprolol succinate 50 mg XL tablet Commonly known as:  TOPROL-XL Take 1 Tab by mouth daily. nystatin powder Commonly known as:  MYCOSTATIN Apply once daily to rash of abdomen PRESERVISION AREDS 2 PO Take  by mouth. SYNTHROID 150 mcg tablet Generic drug:  levothyroxine TAKE 1 TABLET BY MOUTH 6 days per week and 1/2 pill on Sundays only  
  
 torsemide 10 mg tablet Commonly known as:  DEMADEX Take 1 Tab by mouth daily. triamcinolone acetonide 0.1 % topical cream  
Commonly known as:  KENALOG Apply  to affected area two (2) times a day. use thin layer Prescriptions Sent to Pharmacy  Refills  
 metoprolol succinate (TOPROL-XL) 50 mg XL tablet 3  
 Sig: Take 1 Tab by mouth daily. Class: Normal  
 Pharmacy: [facility]/pharmacy #[id number] [place], [place] - [address] Ph #: [phone] Route: Oral  
 torsemide (DEMADEX) 10 mg tablet 3 Sig: Take 1 Tab by mouth daily. Class: Normal  
 Pharmacy: [facility]/pharmacy #[id number] - [place], [place] - [address] Ph #: [phone] Route: Oral  
  
We Performed the Following AMB POC EKG ROUTINE W/ 12 LEADS, INTER & REP [87961 CPT(R)] Introducing [facility]! Dear La Acevedo: Thank you for requesting a [employer] account. Our records indicate that you have previously registered for a [employer] account but its currently inactive. Please call our [employer] support line at [phone]. Additional Information If you have questions, please visit the Frequently Asked Questions section of the [employer] website at [email or web address] Remember, [employer] is NOT to be used for urgent needs. For medical emergencies, dial 911. Now available from your iPhone and Android! Please provide this summary of care documentation to your next provider. Your primary care clinician is listed as Rae Sexton. If you have any questions after today's visit, please call [phone].

## 2017-05-04 NOTE — PROGRESS NOTES
Visit Vitals    /70 (BP 1 Location: Left arm, BP Patient Position: Sitting)    Pulse 80    Resp 16    Ht 5' 10\" (1.778 m)    Wt 224 lb 9.6 oz (101.9 kg)    SpO2 92%    BMI 32.23 kg/m2

## 2017-05-04 NOTE — PROGRESS NOTES
Vicki Leyden, MD. [facility] [place]              Patient: Raymundo Wilkins  : 1932      Today's Date: 2017            HISTORY OF PRESENT ILLNESS:     History of Present Illness:  Ms. Luis E Lambert is here for follow-up. Still with trouble with swelling in feet. Could not tolerate support hose - better lying down. She has a numbness in burning in her feet. No CP. Chronic GARCIA from COPD - stable. PAST MEDICAL HISTORY:     Past Medical History:   Diagnosis Date    Anemia     hx b12 def age 22-31s    COPD (chronic obstructive pulmonary disease) ([facility] 2011    FEV1 1 L 2011. Dr. Deepthi Palomo Diastolic dysfunction     GERD (gastroesophageal reflux disease)     Glaucoma suspect of both eyes     Dr Lisbeth Farmer    Hemorrhoids     Migraine     Mixed stress and urge urinary incontinence     OA (osteoarthritis) of knee     R, Dr. Derrick Parker. Euflexa injections x3. 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 Marianna Gomez of foot, bilateral     Psoriasis     left ear    PSVT (paroxysmal supraventricular tachycardia) ([facility] 2012    Dr. Jose A Núñez Pulmonary nodules 2011    RLL x2.  stable 2012  Dr. 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  Dr. Jhonny Maldonado    menorrhagia    HX OTHER SURGICAL      Echo 14 - LVEF 60-65%, grade 1 DD    HX TONSILLECTOMY      NM CARDIAC SPECT W STRS/REST MULT  11    small sized fixed defect in inferolateral wall may represent scar or abd attenuation; EF 80%    VAS LOWER EXT ART PVR MULT LEVEL SEG PRESSURES  7/18/11    normal           MEDICATIONS:     Current Outpatient Prescriptions   Medication Sig Dispense Refill    SYNTHROID 150 mcg tablet TAKE 1 TABLET BY MOUTH 6 days per week and 1/2 pill on Sundays only 90 Tab 3    diclofenac (VOLTAREN) 1 % gel Apply 4 g to affected area four (4) times daily. 100 g 11    metoprolol succinate (TOPROL-XL) 50 mg XL tablet Take 1 Tab by mouth daily. 90 Tab 1    torsemide (DEMADEX) 10 mg tablet Take 1 Tab by mouth daily. 90 Tab 1    nystatin (MYCOSTATIN) powder Apply once daily to rash of abdomen 30 g 5    budesonide-formoterol (SYMBICORT) 160-4.5 mcg/actuation HFA inhaler Take 2 Puffs by inhalation two (2) times a day. 3 Inhaler 5    docusate sodium (COLACE) 100 mg capsule Take 1 capsule by mouth two (2) times daily as needed for Constipation. 60 capsule 2    hydrocortisone (SCALPICIN) 1 % soln Apply daily 105 mL 5    albuterol (PROVENTIL HFA) 90 mcg/actuation inhaler Take 2 Puffs by inhalation every four (4) hours as needed for Wheezing. 1 Inhaler 5    ANTIOX #8/OM3/DHA/EPA/LUT/ZEAX (PRESERVISION AREDS 2 PO) Take  by mouth.  triamcinolone acetonide (KENALOG) 0.1 % topical cream Apply  to affected area two (2) times a day. use thin layer 30 g 3    aspirin delayed-release (ASPIR-81) 81 mg tablet Take  by mouth daily.          Allergies   Allergen Reactions    Ciprofloxacin (Bulk) Rash    Diltiazem Rash    Piperacillin Other (comments)    Tazobactam Other (comments)             SOCIAL HISTORY:     Social History   Substance Use Topics    Smoking status: Former Smoker     Packs/day: 1.00     Years: 60.00     Types: Cigarettes     Quit date: 8/27/2011    Smokeless tobacco: Never Used    Alcohol use No           FAMILY HISTORY:     Family History   Problem Relation Age of Onset    Cancer Maternal Grandmother      breast    Cancer Sister      lymphopma    Heart Disease Father     Heart Disease Paternal Grandfather     Seizures Sister     Arthritis-osteo Mother     Dementia Mother             REVIEW OF SYSTEMS:      Review of Systems:    Constitutional: Negative for fever, chills    HEENT: Negative for vision changes.    Respiratory: Negative for cough    Cardiovascular: Negative for orthopnea, syncope, and PND.    Gastrointestinal: Negative for abdominal pain, diarrhea, or melena    Genitourinary: Negative for dysuria  + urinary incontinence at times   Musculoskeletal: Negative for myalgias. + joint pain    Skin: Negative for rash    Heme: No problems bleeding.    Neurological: + leg neuropathy            PHYSICAL EXAM:      Physical Exam:   Visit Vitals    /70 (BP 1 Location: Left arm, BP Patient Position: Sitting)    Pulse 80    Resp 16    Ht 5' 10\" (1.778 m)    Wt 224 lb 9.6 oz (101.9 kg)    SpO2 92%    BMI 32.23 kg/m2      Patient appears generally well, mood and affect are appropriate and pleasant.    HEENT: Normocephalic, atraumatic.    Neck Exam: Supple, No carotid bruits.    Lung Exam: Clear to auscultation, even breath sounds.    Cardiac Exam: Regular rate and rhythm with no murmur  Abdomen: Soft, non-tender, normal bowel sounds. + obese  Extremities: trace bilat ankle edema.   MAW  Vascular: 2+ dorsalis pedis pulses bilaterally. Neuro - non focal            LABS / OTHER STUDIES:      Lab Results   Component Value Date/Time    Sodium 145 03/13/2017 02:49 AM    Potassium 4.8 03/13/2017 02:49 AM    Chloride 104 03/13/2017 02:49 AM    CO2 24 03/13/2017 02:49 AM    Anion gap 7 05/23/2012 05:18 AM    Glucose 90 03/13/2017 02:49 AM    BUN 17 03/13/2017 02:49 AM    Creatinine 1.15 03/13/2017 02:49 AM    BUN/Creatinine ratio 15 03/13/2017 02:49 AM    GFR est AA 50 03/13/2017 02:49 AM    GFR est non-AA 44 03/13/2017 02:49 AM    Calcium 9.5 03/13/2017 02:49 AM    Bilirubin, total 0.4 11/17/2016 02:09 PM    AST (SGOT) 15 11/17/2016 02:09 PM    Alk.  phosphatase 119 11/17/2016 02:09 PM    Protein, total 6.8 11/17/2016 02:09 PM    Albumin 4.1 11/17/2016 02:09 PM    Globulin 4.2 05/20/2012 04:50 AM    A-G Ratio 1.5 11/17/2016 02:09 PM    ALT (SGPT) 12 11/17/2016 02:09 PM     Lab Results   Component Value Date/Time    WBC 8.6 11/17/2016 02:09 PM    Hemoglobin (POC) 11.2 05/11/2012 09:58 PM    HGB 13.7 11/17/2016 02:09 PM    Hematocrit (POC) 33 05/11/2012 09:58 PM    HCT 42.0 11/17/2016 02:09 PM    PLATELET 917 69/62/4336 02:09 PM    MCV 95 11/17/2016 02:09 PM       Lab Results   Component Value Date/Time    Cholesterol, total 207 11/17/2016 02:09 PM    HDL Cholesterol 63 11/17/2016 02:09 PM    LDL, calculated 124 11/17/2016 02:09 PM    VLDL, calculated 20 11/17/2016 02:09 PM    Triglyceride 102 11/17/2016 02:09 PM       Lab Results   Component Value Date/Time    TSH 0.557 03/13/2017 02:49 AM              CARDIAC DIAGNOSTICS:      Cardiac Evaluation Includes:  Echo 7/31/14 - LVEF 60-65%, grade 1 DD     EKG 5/4/17 - NSR, low voltage, and PVC          ASSESSMENT AND PLAN:      Assessment and Plan:    1) LE Edema    - Has chronic complaints    - Problems due to Venous Insufficiency most likely  - Echo 7/31/14 - LVEF 60-65%, grade 1 DD  - I stopped verapamil in past and instead tried a beta-blocker (Toprol XL 50 mg daily), with mild improvement in symptoms    - Continue with leg elevation. She did not like using support hose.     - Continue diuretic (torsemide) - stable symptoms on that (recent labs OK)     2) SVT    - no recurrences of tachcycardia.    - Tolerating Toprol XL     3) Chronic GARCIA    - due to COPD.    - Prior echo and stress test without major abnormalities.    4) Dyslipidemia  - she declines statin   - At age > 76, benefit of statin for primary prevention is not clear      5) See me back in one year. Patient expressed understanding of the plan - questions were answered.    Sister has La Fuentes MD, [address]  [facility]  [address] [place] [address], [facility] [address]  [address], Avi Singh [address] [place] [address]  Ph: [phone]   Ph [phone]

## 2017-06-14 ENCOUNTER — HOSPITAL ENCOUNTER (OUTPATIENT)
Dept: GENERAL RADIOLOGY | Age: 82
Discharge: HOME | End: 2017-06-14
Payer: MEDICARE

## 2017-06-14 DIAGNOSIS — R06.02 SHORTNESS OF BREATH: ICD-10-CM

## 2017-06-14 PROCEDURE — 71020 XR CHEST PA LAT: CPT

## 2017-11-16 ENCOUNTER — OFFICE VISIT (OUTPATIENT)
Dept: INTERNAL MEDICINE CLINIC | Age: 82
End: 2017-11-16

## 2017-11-16 ENCOUNTER — HOSPITAL ENCOUNTER (OUTPATIENT)
Dept: LAB | Age: 82
Discharge: HOME | End: 2017-11-16
Payer: MEDICARE

## 2017-11-16 VITALS
WEIGHT: 234 LBS | HEIGHT: 70 IN | OXYGEN SATURATION: 95 % | HEART RATE: 73 BPM | RESPIRATION RATE: 18 BRPM | DIASTOLIC BLOOD PRESSURE: 77 MMHG | BODY MASS INDEX: 33.5 KG/M2 | SYSTOLIC BLOOD PRESSURE: 128 MMHG | TEMPERATURE: 97.6 F

## 2017-11-16 DIAGNOSIS — I50.32 CHRONIC DIASTOLIC HEART FAILURE (HCC): ICD-10-CM

## 2017-11-16 DIAGNOSIS — Z23 ENCOUNTER FOR IMMUNIZATION: ICD-10-CM

## 2017-11-16 DIAGNOSIS — M81.0 OSTEOPOROSIS, UNSPECIFIED OSTEOPOROSIS TYPE, UNSPECIFIED PATHOLOGICAL FRACTURE PRESENCE: ICD-10-CM

## 2017-11-16 DIAGNOSIS — N18.30 CKD (CHRONIC KIDNEY DISEASE), STAGE III (HCC): ICD-10-CM

## 2017-11-16 DIAGNOSIS — J30.2 CHRONIC SEASONAL ALLERGIC RHINITIS DUE TO OTHER ALLERGEN: ICD-10-CM

## 2017-11-16 DIAGNOSIS — J44.9 CHRONIC OBSTRUCTIVE PULMONARY DISEASE, UNSPECIFIED COPD TYPE (HCC): Primary | ICD-10-CM

## 2017-11-16 DIAGNOSIS — E55.9 VITAMIN D DEFICIENCY: ICD-10-CM

## 2017-11-16 DIAGNOSIS — E03.9 HYPOTHYROIDISM, UNSPECIFIED TYPE: ICD-10-CM

## 2017-11-16 PROCEDURE — 84443 ASSAY THYROID STIM HORMONE: CPT

## 2017-11-16 PROCEDURE — 84439 ASSAY OF FREE THYROXINE: CPT

## 2017-11-16 PROCEDURE — 85027 COMPLETE CBC AUTOMATED: CPT

## 2017-11-16 PROCEDURE — 82306 VITAMIN D 25 HYDROXY: CPT

## 2017-11-16 PROCEDURE — 36415 COLL VENOUS BLD VENIPUNCTURE: CPT

## 2017-11-16 PROCEDURE — 80053 COMPREHEN METABOLIC PANEL: CPT

## 2017-11-16 RX ORDER — GLUCOSAMINE SULFATE 1500 MG
1000 POWDER IN PACKET (EA) ORAL DAILY
COMMUNITY

## 2017-11-16 RX ORDER — ALENDRONATE SODIUM 70 MG/1
70 TABLET ORAL
Qty: 4 TAB | Refills: 11 | Status: SHIPPED | OUTPATIENT
Start: 2017-11-16 | End: 2018-09-07

## 2017-11-16 RX ORDER — MINERAL OIL
180 ENEMA (ML) RECTAL DAILY
Qty: 30 TAB | Refills: 11
Start: 2017-11-16 | End: 2018-09-07

## 2017-11-16 NOTE — MR AVS SNAPSHOT
Visit Information Date & Time Provider Department Dept. Phone Encounter #  
 11/16/2017  1:15 PM Aishwarya Triplett MD [facility] [address] [id number] Your Appointments 5/24/2018  1:20 PM  
ESTABLISHED PATIENT with Alma Moreno MD  
[facility] ([facility]) Appt Note: annual  
 [address] [place] [address] [facility]  
[address] [facility] [place] [address] [address] [facility] Upcoming Health Maintenance Date Due  
 MEDICARE YEARLY EXAM 3/14/2018 GLAUCOMA SCREENING Q2Y 10/7/2018 DTaP/Tdap/Td series (2 - Td) 11/16/2027 Allergies as of 11/16/2017  Review Complete On: 11/16/2017 By: Aishwarya Triplett MD  
  
 Severity Noted Reaction Type Reactions Ciprofloxacin (Bulk)  05/07/2012    Rash Diltiazem  06/15/2012    Rash Piperacillin  04/26/2013    Other (comments) Tazobactam  04/26/2013    Other (comments) Current Immunizations  Reviewed on 11/16/2017 Name Date Influenza High Dose Vaccine PF  Incomplete, 11/17/2016, 12/16/2015 Influenza Vaccine 9/13/2014, 9/15/2013 Pneumococcal Conjugate (PCV-13) 12/16/2015 Pneumococcal Polysaccharide (PPSV-23) 10/1/2009 Zoster Vaccine, Live 3/13/2017 Reviewed by Aishwarya Triplett MD on 11/16/2017 at  1:51 PM  
 Reviewed by Aishwarya Triplett MD on 11/16/2017 at  1:52 PM  
You Were Diagnosed With   
  
 Codes Comments Chronic obstructive pulmonary disease, unspecified COPD type ([facility]    -  Primary ICD-10-CM: J44.9 ICD-9-CM: 388 Encounter for immunization     ICD-10-CM: K66 ICD-9-CM: V03.89 Hypothyroidism, unspecified type     ICD-10-CM: E03.9 ICD-9-CM: 244.9 CKD (chronic kidney disease), stage III     ICD-10-CM: N18.3 ICD-9-CM: 585.3 Osteoporosis, unspecified osteoporosis type, unspecified pathological fracture presence     ICD-10-CM: M81.0 ICD-9-CM: 733.00   
 Vitamin D deficiency     ICD-10-CM: E55.9 ICD-9-CM: 268.9 Chronic diastolic heart failure (HCC)     ICD-10-CM: I50.32 
ICD-9-CM: 428.32 Chronic seasonal allergic rhinitis due to other allergen     ICD-10-CM: J30.2 ICD-9-CM: 477.8 Vitals BP Pulse Temp Resp Height(growth percentile) Weight(growth percentile) 128/77 (BP 1 Location: Left arm, BP Patient Position: Sitting) 73 97.6 °F (36.4 °C) (Oral) 18 5' 10\" (1.778 m) 234 lb (106.1 kg) SpO2 BMI OB Status Smoking Status 95% 33.58 kg/m2 Hysterectomy Former Smoker Vitals History BMI and BSA Data Body Mass Index Body Surface Area 33.58 kg/m 2 2.29 m 2 Preferred Pharmacy Pharmacy Name Phone [id number] - [place], [place] - [address] [address] [facility] [address] Antonio Serna [phone] Your Updated Medication List  
  
   
This list is accurate as of: 11/16/17  1:57 PM.  Always use your most recent med list.  
  
  
  
  
 albuterol 90 mcg/actuation inhaler Commonly known as:  PROVENTIL HFA Take 2 Puffs by inhalation every four (4) hours as needed for Wheezing. alendronate 70 mg tablet Commonly known as:  FOSAMAX Take 1 Tab by mouth every seven (7) days. ASPIR-81 81 mg tablet Generic drug:  aspirin delayed-release Take  by mouth daily. diclofenac 1 % Gel Commonly known as:  VOLTAREN Apply 4 g to affected area four (4) times daily. docusate sodium 100 mg capsule Commonly known as:  Taisha Bond Take 1 capsule by mouth two (2) times daily as needed for Constipation. fexofenadine 180 mg tablet Commonly known as:  Seretha Lever Take 1 Tab by mouth daily. hydrocortisone 1 % Soln Commonly known as:  SCALPICIN ANTI-ITCH Apply daily  
  
 metoprolol succinate 50 mg XL tablet Commonly known as:  TOPROL-XL Take 1 Tab by mouth daily. nystatin powder Commonly known as:  MYCOSTATIN Apply once daily to rash of abdomen  PRESERVISION AREDS 2 PO  
 Take  by mouth. SYNTHROID 150 mcg tablet Generic drug:  levothyroxine TAKE 1 TABLET BY MOUTH 6 days per week and 1/2 pill on Sundays only  
  
 torsemide 10 mg tablet Commonly known as:  DEMADEX Take 1 Tab by mouth daily. triamcinolone acetonide 0.1 % topical cream  
Commonly known as:  KENALOG Apply  to affected area two (2) times a day. use thin layer VITAMIN D3 1,000 unit Cap Generic drug:  cholecalciferol Take  by mouth daily. Prescriptions Printed Refills  
 alendronate (FOSAMAX) 70 mg tablet 11 Sig: Take 1 Tab by mouth every seven (7) days. Class: Print Route: Oral  
  
We Performed the Following ADMIN INFLUENZA VIRUS VAC [ HCPCS] CBC W/O DIFF [46907 CPT(R)] INFLUENZA VIRUS VACCINE, HIGH DOSE SEASONAL, PRESERVATIVE FREE [42837 CPT(R)] METABOLIC PANEL, COMPREHENSIVE [71433 CPT(R)] T4, FREE [id number] CPT(R)] TSH 3RD GENERATION [21083 CPT(R)] VITAMIN D, 25 HYDROXY [id number] CPT(R)] Introducing [facility] HE[facility]! Dear Zeeshan Dorsey: Thank you for requesting a [employer] [facility] account. Our records indicate that you have previously registered for a [employer] account but its currently inactive. Please call our [employer] support line at [phone]. Additional Information If you have questions, please visit the Frequently Asked Questions section of the [facility] website at [email or web address] Remember, [facility] is NOT to be used for urgent needs. For medical emergencies, dial 911. Now available from your iPhone and Android! Please provide this summary of care documentation to your next provider. Your primary care clinician is listed as Stacie Campbell. If you have any questions after today's visit, please call [phone].

## 2017-11-16 NOTE — PROGRESS NOTES
HISTORY OF PRESENT ILLNESS    Chief Complaint   Patient presents with    Hypothyroidism    Osteoporosis       Presents for follow-up    She is ambulating slowly. May visit her daughter again soon if they pick her up    COPD. She is seeing Dr. David Drake. Stopped Breo and was asked to use allegra prn. She reports mild nasal congestion and post-nasal drip. Hypothyroidism follow-up  Reports  MILD TREMOR  denies heat/cold intolerance, bowel/skin changes or CVS symptoms, losing hair, feeling excessive energy, tremulousness, palpitations. Thyroid medication has been unchanged since last medication check and labs. Lab Results   Component Value Date/Time    TSH 0.557 03/13/2017 02:49 AM    T4, Free 1.59 03/13/2017 02:49 AM     Wt Readings from Last 3 Encounters:   11/16/17 234 lb (106.1 kg)   05/04/17 224 lb 9.6 oz (101.9 kg)   03/13/17 232 lb (105.2 kg)     Osteoporosis. On DEXA 4/2017. Taking vitamin D. No hx of fracture. Review of Systems   All other systems reviewed and are negative, except as noted in HPI    Past Medical and Surgical History   has a past medical history of Anemia; COPD (chronic obstructive pulmonary disease) ([address] (7/2011); Diastolic dysfunction (0/87/3398); GERD (gastroesophageal reflux disease); Glaucoma suspect of both eyes; Hemorrhoids; Migraine; Mixed stress and urge urinary incontinence; OA (osteoarthritis) of knee; Osteoporosis; Other postablative hypothyroidism (1987); Paresthesia of foot, bilateral; Psoriasis; PSVT (paroxysmal supraventricular tachycardia) ([address] (5/19/2012); Pulmonary nodules (7/2011); Sepsis(995.91) (5/14/2012); 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    has a past surgical history that includes tonsillectomy; hysterectomy (1974); cataract removal; echo 2d adult (7/15/11); nm cardiac spect w strs/rest mult (7/18/11); vas lower ext art pvr mult level seg pressures (7/18/11); echo 2d adult (5/19/12); colonoscopy (6/28/12); and other surgical.     reports that she quit smoking about 6 years ago. Her smoking use included Cigarettes. She has a 60.00 pack-year smoking history. She has never used smokeless tobacco. She reports that she does not drink alcohol or use illicit drugs. family history includes Arthritis-osteo in her mother; Cancer in her maternal grandmother and sister; Dementia in her mother; Heart Disease in her father and paternal grandfather; Seizures in her sister. There is no history of Malignant Hyperthermia, Pseudocholinesterase Deficiency, Delayed Awakening, Post-op Nausea/Vomiting, Emergence Delirium, Post-op Cognitive Dysfunction, or Other. Physical Exam   Nursing note and vitals reviewed. Blood pressure 128/77, pulse 73, temperature 97.6 °F (36.4 °C), temperature source Oral, resp. rate 18, height 5' 10\" (1.778 m), weight 234 lb (106.1 kg), SpO2 95 %. Constitutional:  No distress. Eyes: Conjunctivae are normal.   Ears:  Hearing grossly intact  Cardiovascular: Normal rate. regular rhythm, no murmurs or gallops  No edema  Pulmonary/Chest: Effort normal.   CTAB  Musculoskeletal: moves all 4 extremities   Neurological: Alert and oriented to person, place, and time. Skin: No rash noted. Psychiatric: Normal mood and affect. Behavior is normal.     ASSESSMENT and PLAN  Diagnoses and all orders for this visit:    1. Chronic obstructive pulmonary disease, unspecified COPD type ([facility] - Currently asymptomatic off controllers per pulmonary. Recommend she resume allegra for allergic s/s. She has albuterol prn  -     fexofenadine (ALLEGRA) 180 mg tablet; Take 1 Tab by mouth daily. 2. Encounter for immunization  -     Influenza virus vaccine ([address]) 72 years and older ([address])  -     Administration fee () for Medicare insured patients    3.  Hypothyroidism, unspecified type - tremor may due to overactive  -     TSH 3RD GENERATION  -     T4, FREE    4. CKD (chronic kidney disease), stage III - Controlled on current regimen. Continue current medications as written in chart  -     METABOLIC PANEL, COMPREHENSIVE  -     CBC W/O DIFF    5. Osteoporosis, unspecified osteoporosis type, unspecified pathological fracture presence  Cont vit D. If levels are ok, start fosamax  -     alendronate (FOSAMAX) 70 mg tablet; Take 1 Tab by mouth every seven (7) days. 6. Vitamin D deficiency  -     VITAMIN D, 25 HYDROXY    7. Chronic diastolic heart failure (HCC) = Currently asymptomatic    8. Chronic seasonal allergic rhinitis due to other allergen - cont allegra    lab results and schedule of future lab studies reviewed with patient  reviewed medications and side effects in detail    Return to clinic for further evaluation if new symptoms develop    Follow-up Disposition: Not on File    Current Outpatient Prescriptions   Medication Sig    cholecalciferol (VITAMIN D3) 1,000 unit cap Take  by mouth daily.  fexofenadine (ALLEGRA) 180 mg tablet Take 1 Tab by mouth daily.  alendronate (FOSAMAX) 70 mg tablet Take 1 Tab by mouth every seven (7) days.  metoprolol succinate (TOPROL-XL) 50 mg XL tablet Take 1 Tab by mouth daily.  torsemide (DEMADEX) 10 mg tablet Take 1 Tab by mouth daily.  SYNTHROID 150 mcg tablet TAKE 1 TABLET BY MOUTH 6 days per week and 1/2 pill on Sundays only    diclofenac (VOLTAREN) 1 % gel Apply 4 g to affected area four (4) times daily.  nystatin (MYCOSTATIN) powder Apply once daily to rash of abdomen    docusate sodium (COLACE) 100 mg capsule Take 1 capsule by mouth two (2) times daily as needed for Constipation.  hydrocortisone (SCALPICIN) 1 % soln Apply daily    albuterol (PROVENTIL HFA) 90 mcg/actuation inhaler Take 2 Puffs by inhalation every four (4) hours as needed for Wheezing.  ANTIOX #8/OM3/DHA/EPA/LUT/ZEAX (PRESERVISION AREDS 2 PO) Take  by mouth.     triamcinolone acetonide (KENALOG) 0.1 % topical cream Apply  to affected area two (2) times a day. use thin layer    aspirin delayed-release (ASPIR-81) 81 mg tablet Take  by mouth daily. No current facility-administered medications for this visit.

## 2017-11-17 LAB
25(OH)D3+25(OH)D2 SERPL-MCNC: 37.7 NG/ML (ref 30–100)
ALBUMIN SERPL-MCNC: 4.2 G/DL (ref 3.5–4.7)
ALBUMIN/GLOB SERPL: 1.6 {RATIO} (ref 1.2–2.2)
ALP SERPL-CCNC: 111 IU/L (ref 39–117)
ALT SERPL-CCNC: 9 IU/L (ref 0–32)
AST SERPL-CCNC: 10 IU/L (ref 0–40)
BILIRUB SERPL-MCNC: 0.2 MG/DL (ref 0–1.2)
BUN SERPL-MCNC: 18 MG/DL (ref 8–27)
BUN/CREAT SERPL: 15 (ref 12–28)
CALCIUM SERPL-MCNC: 9.4 MG/DL (ref 8.7–10.3)
CHLORIDE SERPL-SCNC: 103 MMOL/L (ref 96–106)
CO2 SERPL-SCNC: 26 MMOL/L (ref 18–29)
CREAT SERPL-MCNC: 1.19 MG/DL (ref 0.57–1)
ERYTHROCYTE [DISTWIDTH] IN BLOOD BY AUTOMATED COUNT: 14.8 % (ref 12.3–15.4)
GFR SERPLBLD CREATININE-BSD FMLA CKD-EPI: 42 ML/MIN/1.73
GFR SERPLBLD CREATININE-BSD FMLA CKD-EPI: 48 ML/MIN/1.73
GLOBULIN SER CALC-MCNC: 2.6 G/DL (ref 1.5–4.5)
GLUCOSE SERPL-MCNC: 93 MG/DL (ref 65–99)
HCT VFR BLD AUTO: 40.5 % (ref 34–46.6)
HGB BLD-MCNC: 12.7 G/DL (ref 11.1–15.9)
INTERPRETATION: NORMAL
MCH RBC QN AUTO: 30 PG (ref 26.6–33)
MCHC RBC AUTO-ENTMCNC: 31.4 G/DL (ref 31.5–35.7)
MCV RBC AUTO: 96 FL (ref 79–97)
PLATELET # BLD AUTO: 268 X10E3/UL (ref 150–379)
POTASSIUM SERPL-SCNC: 4.3 MMOL/L (ref 3.5–5.2)
PROT SERPL-MCNC: 6.8 G/DL (ref 6–8.5)
RBC # BLD AUTO: 4.24 X10E6/UL (ref 3.77–5.28)
SODIUM SERPL-SCNC: 146 MMOL/L (ref 134–144)
T4 FREE SERPL-MCNC: 1.57 NG/DL (ref 0.82–1.77)
TSH SERPL DL<=0.005 MIU/L-ACNC: 0.74 UIU/ML (ref 0.45–4.5)
WBC # BLD AUTO: 5.8 X10E3/UL (ref 3.4–10.8)

## 2017-11-28 ENCOUNTER — TELEPHONE (OUTPATIENT)
Dept: INTERNAL MEDICINE CLINIC | Age: 82
End: 2017-11-28

## 2017-11-28 NOTE — TELEPHONE ENCOUNTER
Pt is calling for clarification , states she received her lab results but it doesn't state whether or not she should start the Fosamax or not or if she needs to continue taking her Vit D  .      Asks to return her call when you can [phone]

## 2018-01-26 DIAGNOSIS — E03.9 HYPOTHYROIDISM, UNSPECIFIED TYPE: ICD-10-CM

## 2018-01-26 RX ORDER — LEVOTHYROXINE SODIUM 150 MCG
TABLET ORAL
Qty: 90 TAB | Refills: 3 | Status: SHIPPED | OUTPATIENT
Start: 2018-01-26 | End: 2018-09-07

## 2018-05-24 ENCOUNTER — OFFICE VISIT (OUTPATIENT)
Dept: CARDIOLOGY CLINIC | Age: 83
End: 2018-05-24

## 2018-05-24 VITALS
RESPIRATION RATE: 18 BRPM | DIASTOLIC BLOOD PRESSURE: 70 MMHG | OXYGEN SATURATION: 98 % | BODY MASS INDEX: 30.75 KG/M2 | SYSTOLIC BLOOD PRESSURE: 105 MMHG | HEIGHT: 70 IN | WEIGHT: 214.8 LBS | HEART RATE: 81 BPM

## 2018-05-24 DIAGNOSIS — I50.32 CHRONIC DIASTOLIC HEART FAILURE (HCC): Primary | ICD-10-CM

## 2018-05-24 DIAGNOSIS — I47.1 PSVT (PAROXYSMAL SUPRAVENTRICULAR TACHYCARDIA) (HCC): ICD-10-CM

## 2018-05-24 DIAGNOSIS — E78.5 DYSLIPIDEMIA: ICD-10-CM

## 2018-05-24 DIAGNOSIS — I50.41 ACUTE COMBINED SYSTOLIC AND DIASTOLIC ACC/AHA STAGE C CONGESTIVE HEART FAILURE (HCC): ICD-10-CM

## 2018-05-24 DIAGNOSIS — N18.30 CKD (CHRONIC KIDNEY DISEASE), STAGE III (HCC): ICD-10-CM

## 2018-05-24 RX ORDER — METOPROLOL SUCCINATE 50 MG/1
50 TABLET, EXTENDED RELEASE ORAL DAILY
Qty: 90 TAB | Refills: 3 | Status: SHIPPED | OUTPATIENT
Start: 2018-05-24 | End: 2018-09-07

## 2018-05-24 RX ORDER — DEXTROMETHORPHAN HYDROBROMIDE, GUAIFENESIN 5; 100 MG/5ML; MG/5ML
650 LIQUID ORAL 2 TIMES DAILY
COMMUNITY
End: 2018-10-01

## 2018-05-24 NOTE — PROGRESS NOTES
Chief Complaint   Patient presents with    Annual Exam     PSVT (paroxysmal supraventricular tachycardia) ([facility] Diastolic heart failure ([facility]       1. Have you been to the ER, urgent care clinic since your last visit? Hospitalized since your last visit? No    2. Have you seen or consulted any other health care providers outside of the [address] [place] [address] since your last visit? Include any pap smears or colon screening.  No    Visit Vitals    /70 (BP 1 Location: Right arm, BP Patient Position: Sitting)    Pulse 81    Resp 18    Ht 5' 10\" (1.778 m)    Wt 214 lb 12.8 oz (97.4 kg)    SpO2 98%    BMI 30.82 kg/m2

## 2018-05-24 NOTE — PROGRESS NOTES
Ivone Miller MD. [facility] [place]              Patient: Tonny Jeffries  : 1932      Today's Date: 2018            HISTORY OF PRESENT ILLNESS:     History of Present Illness:  Ms. Dionne May is here for follow-up. She is doing OK. Has chronic GARCIA which is unchanged. She is losing some weight. Slight, infrequent dizziness. PAST MEDICAL HISTORY:     Past Medical History:   Diagnosis Date    Anemia     hx b12 def age 22-31s    COPD (chronic obstructive pulmonary disease) ([facility] 2011    FEV1 1 L 2011. Dr. Joana Philippe Diastolic dysfunction     GERD (gastroesophageal reflux disease)     Glaucoma suspect of both eyes     Dr Jenny Mendoza    Hemorrhoids     Migraine     Mixed stress and urge urinary incontinence     OA (osteoarthritis) of knee     R, Dr. Prince Ochoa. Euflexa injections x3. has walker    Osteoporosis     Other postablative hypothyroidism     ROSAS   saw Dr. Aishwarya Vazquez Paresthesia of foot, bilateral     Psoriasis     left ear    PSVT (paroxysmal supraventricular tachycardia) ([facility] [address] [facility] 2012    Dr. Aaron Hasting Pulmonary nodules 2011    RLL x2.  stable 2012  Dr. 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 Jenny Mendoza, bilaterally    HX COLONOSCOPY  12    2 polyps.  Dr. Portillo Fraser    menorrhagia    HX OTHER SURGICAL      Echo 14 - LVEF 60-65%, grade 1 DD    HX TONSILLECTOMY      NM CARDIAC SPECT W STRS/REST MULT  11    small sized fixed defect in inferolateral wall may represent scar or abd attenuation; EF 80%    VAS LOWER EXT ART PVR MULT LEVEL SEG PRESSURES 7/18/11    normal           MEDICATIONS:     Current Outpatient Prescriptions   Medication Sig Dispense Refill    acetaminophen (TYLENOL ARTHRITIS PAIN) 650 mg TbER Take 1,300 mg by mouth two (2) times a day.  SYNTHROID 150 mcg tablet TAKE 1 TABLET BY MOUTH 6 DAYS PER WEEK AND 1/2 TABLET ON SUNDAYS 90 Tab 3    cholecalciferol (VITAMIN D3) 1,000 unit cap Take  by mouth daily.  fexofenadine (ALLEGRA) 180 mg tablet Take 1 Tab by mouth daily. 30 Tab 11    alendronate (FOSAMAX) 70 mg tablet Take 1 Tab by mouth every seven (7) days. 4 Tab 11    metoprolol succinate (TOPROL-XL) 50 mg XL tablet Take 1 Tab by mouth daily. 90 Tab 3    torsemide (DEMADEX) 10 mg tablet Take 1 Tab by mouth daily. 90 Tab 3    diclofenac (VOLTAREN) 1 % gel Apply 4 g to affected area four (4) times daily. 100 g 11    nystatin (MYCOSTATIN) powder Apply once daily to rash of abdomen 30 g 5    docusate sodium (COLACE) 100 mg capsule Take 1 capsule by mouth two (2) times daily as needed for Constipation. 60 capsule 2    hydrocortisone (SCALPICIN) 1 % soln Apply daily 105 mL 5    albuterol (PROVENTIL HFA) 90 mcg/actuation inhaler Take 2 Puffs by inhalation every four (4) hours as needed for Wheezing. 1 Inhaler 5    ANTIOX #8/OM3/DHA/EPA/LUT/ZEAX (PRESERVISION AREDS 2 PO) Take  by mouth.  triamcinolone acetonide (KENALOG) 0.1 % topical cream Apply  to affected area two (2) times a day. use thin layer 30 g 3    aspirin delayed-release (ASPIR-81) 81 mg tablet Take  by mouth daily.          Allergies   Allergen Reactions    Ciprofloxacin (Bulk) Rash    Diltiazem Rash    Piperacillin Other (comments)    Tazobactam Other (comments)             SOCIAL HISTORY:     Social History   Substance Use Topics    Smoking status: Former Smoker     Packs/day: 1.00     Years: 60.00     Types: Cigarettes     Quit date: 8/27/2011    Smokeless tobacco: Never Used    Alcohol use No           FAMILY HISTORY:     Family History   Problem Relation Age of Onset    Cancer Maternal Grandmother      breast    Cancer Sister      lymphopma    Heart Disease Father     Heart Disease Paternal Grandfather     Seizures Sister     Arthritis-osteo Mother     Dementia Mother     Malignant Hyperthermia Neg Hx     Pseudocholinesterase Deficiency Neg Hx     Delayed Awakening Neg Hx     Post-op Nausea/Vomiting Neg Hx     Emergence Delirium Neg Hx     Post-op Cognitive Dysfunction Neg Hx     Other Neg Hx               REVIEW OF SYSTEMS:       Review of Systems:    Constitutional: Negative for fever, chills    HEENT: Negative for vision changes.    Respiratory: Negative for cough    Cardiovascular: Negative for orthopnea, syncope, and PND.    Gastrointestinal: Negative for abdominal pain, diarrhea, or melena    Genitourinary: Negative for dysuria  + urinary incontinence at times   Musculoskeletal: Negative for myalgias. + joint pain    Skin: Negative for rash    Heme: No problems bleeding.    Neurological: + leg neuropathy               PHYSICAL EXAM:       Physical Exam:   Visit Vitals    /70 (BP 1 Location: Right arm, BP Patient Position: Sitting)    Pulse 81    Resp 18    Ht 5' 10\" (1.778 m)    Wt 214 lb 12.8 oz (97.4 kg)    SpO2 98%    BMI 30.82 kg/m2       Patient appears generally well, mood and affect are appropriate and pleasant.    HEENT: Normocephalic, atraumatic.    Neck Exam: Supple, No carotid bruits.  No JVD  Lung Exam: Clear to auscultation, even breath sounds.    Cardiac Exam: Regular rate and rhythm with no murmur  Abdomen: Soft, non-tender, normal bowel sounds. + obese  Extremities: trace bilat ankle edema.   MAW  Neuro - non focal   Psych - appropriate affect           LABS / OTHER STUDIES:         Lab Results   Component Value Date/Time    Sodium 146 (H) 11/16/2017 02:35 PM    Potassium 4.3 11/16/2017 02:35 PM    Chloride 103 11/16/2017 02:35 PM    CO2 26 11/16/2017 02:35 PM    Anion gap 7 05/23/2012 05:18 AM    Glucose 93 11/16/2017 02:35 PM    BUN 18 11/16/2017 02:35 PM    Creatinine 1.19 (H) 11/16/2017 02:35 PM    BUN/Creatinine ratio 15 11/16/2017 02:35 PM    GFR est AA 48 (L) 11/16/2017 02:35 PM    GFR est non-AA 42 (L) 11/16/2017 02:35 PM    Calcium 9.4 11/16/2017 02:35 PM    Bilirubin, total 0.2 11/16/2017 02:35 PM    AST (SGOT) 10 11/16/2017 02:35 PM    Alk. phosphatase 111 11/16/2017 02:35 PM    Protein, total 6.8 11/16/2017 02:35 PM    Albumin 4.2 11/16/2017 02:35 PM    Globulin 4.2 (H) 05/20/2012 04:50 AM    A-G Ratio 1.6 11/16/2017 02:35 PM    ALT (SGPT) 9 11/16/2017 02:35 PM     Lab Results   Component Value Date/Time    Cholesterol, total 207 (H) 11/17/2016 02:09 PM    HDL Cholesterol 63 11/17/2016 02:09 PM    LDL, calculated 124 (H) 11/17/2016 02:09 PM    VLDL, calculated 20 11/17/2016 02:09 PM    Triglyceride 102 11/17/2016 02:09 PM       Lab Results   Component Value Date/Time    TSH 0.737 11/16/2017 02:35 PM       Lab Results   Component Value Date/Time    Cholesterol, total 207 (H) 11/17/2016 02:09 PM    HDL Cholesterol 63 11/17/2016 02:09 PM    LDL, calculated 124 (H) 11/17/2016 02:09 PM    VLDL, calculated 20 11/17/2016 02:09 PM    Triglyceride 102 11/17/2016 02:09 PM                   CARDIAC DIAGNOSTICS:       Cardiac Evaluation Includes:  Echo 7/31/14 - LVEF 60-65%, grade 1 DD      EKG 5/4/17 - NSR, low voltage, and PVC             ASSESSMENT AND PLAN:       Assessment and Plan:    1) LE Edema    - Has chronic complaints    - Problems due to Venous Insufficiency most likely  - Echo 7/31/14 - LVEF 60-65%, grade 1 DD  - I stopped verapamil in past and instead tried a beta-blocker (Toprol XL 50 mg daily), with mild improvement in symptoms    - Continue with leg elevation.  She did not like using support hose.     - Continue diuretic (torsemide) - stable symptoms on that (recent labs OK)      2) SVT    - no recurrences of tachcycardia.    - Tolerating Toprol XL      3) Chronic GARCIA    - due to COPD.    - Prior echo and stress test without major abnormalities.     4) Dyslipidemia  - she declines statin   - At age > 76, benefit of statin for primary prevention is not clear       5) See me back in one year. Patient expressed understanding of the plan - questions were answered. Sister has lymphoma. Has a daughter in [place]; step-daughter close by. 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   Carmencita Velazquez MD, [address], [address] [place] [address]  Ph: [phone]   Ph [phone]

## 2018-05-24 NOTE — MR AVS SNAPSHOT
[address] [facility] [place] [address] [facility] 
[phone] Patient: Renetta Mckinley MRN: [id number] NUM:7/53/0125 Visit Information Date & Time Provider Department Dept. Phone Encounter #  
 5/24/2018  1:20 PM Abram Mccollum MD [facility] [place] [place] [phone] [id number] Your Appointments 6/6/2019  1:20 PM  
ESTABLISHED PATIENT with Abram Mccollum MD  
[facility] ([address]) Appt Note: annual  
 [address] [facility] [address] [facility]  
[address] [facility] [address] [address] Upcoming Health Maintenance Date Due  
 MEDICARE YEARLY EXAM 3/14/2018 Influenza Age 5 to Adult 8/1/2018 GLAUCOMA SCREENING Q2Y 10/7/2018 DTaP/Tdap/Td series (2 - Td) 11/16/2027 Allergies as of 5/24/2018  Review Complete On: 5/24/2018 By: Abram Mccollum MD  
  
 Severity Noted Reaction Type Reactions Ciprofloxacin (Bulk)  05/07/2012    Rash Diltiazem  06/15/2012    Rash Piperacillin  04/26/2013    Other (comments) Tazobactam  04/26/2013    Other (comments) Current Immunizations  Reviewed on 11/16/2017 Name Date Influenza High Dose Vaccine PF 11/16/2017, 11/17/2016, 12/16/2015 Influenza Vaccine 9/13/2014, 9/15/2013 Pneumococcal Conjugate (PCV-13) 12/16/2015 Pneumococcal Polysaccharide (PPSV-23) 10/1/2009 Zoster Vaccine, Live 3/13/2017 Not reviewed this visit You Were Diagnosed With   
  
 Codes Comments Chronic diastolic heart failure (HCC)    -  Primary ICD-10-CM: I50.32 
ICD-9-CM: 428.32   
 PSVT (paroxysmal supraventricular tachycardia) (HCC)     ICD-10-CM: I47.1 ICD-9-CM: 427.0 CKD (chronic kidney disease), stage III     ICD-10-CM: N18.3 ICD-9-CM: 385. 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 
 ICD-9-CM: 428.41, 428.0 Dyslipidemia     ICD-10-CM: E78.5 ICD-9-CM: 272.4 Vitals BP Pulse Resp Height(growth percentile) Weight(growth percentile) SpO2  
 105/70 (BP 1 Location: Right arm, BP Patient Position: Sitting) 81 18 5' 10\" (1.778 m) 214 lb 12.8 oz (97.4 kg) 98% BMI OB Status Smoking Status 30.82 kg/m2 Hysterectomy Former Smoker Vitals History BMI and BSA Data Body Mass Index Body Surface Area  
 30.82 kg/m 2 2.19 m 2 Preferred Pharmacy Pharmacy Name Phone [id number] [place] - [address] [place] [phone] Your Updated Medication List  
  
   
This list is accurate as of 5/24/18  2:38 PM.  Always use your most recent med list.  
  
  
  
  
 albuterol 90 mcg/actuation inhaler Commonly known as:  PROVENTIL HFA Take 2 Puffs by inhalation every four (4) hours as needed for Wheezing. alendronate 70 mg tablet Commonly known as:  FOSAMAX Take 1 Tab by mouth every seven (7) days. ASPIR-81 81 mg tablet Generic drug:  aspirin delayed-release Take  by mouth daily. diclofenac 1 % Gel Commonly known as:  VOLTAREN Apply 4 g to affected area four (4) times daily. docusate sodium 100 mg capsule Commonly known as:  Maksim Khan Take 1 capsule by mouth two (2) times daily as needed for Constipation. fexofenadine 180 mg tablet Commonly known as:  Maple Moon Take 1 Tab by mouth daily. hydrocortisone 1 % Soln Commonly known as:  SCALPICIN ANTI-ITCH Apply daily  
  
 metoprolol succinate 50 mg XL tablet Commonly known as:  TOPROL-XL Take 1 Tab by mouth daily. nystatin powder Commonly known as:  MYCOSTATIN Apply once daily to rash of abdomen PRESERVISION AREDS 2 PO Take  by mouth. SYNTHROID 150 mcg tablet Generic drug:  levothyroxine TAKE 1 TABLET BY MOUTH 6 DAYS PER WEEK AND 1/2 TABLET ON SUNDAYS  
  
 torsemide 10 mg tablet Commonly known as:  DEMADEX Take 1 Tab by mouth daily. triamcinolone acetonide 0.1 % topical cream  
Commonly known as:  KENALOG Apply  to affected area two (2) times a day. use thin layer TYLENOL ARTHRITIS PAIN 650 mg Maury Chew Generic drug:  acetaminophen Take 1,300 mg by mouth two (2) times a day. VITAMIN D3 1,000 unit Cap Generic drug:  cholecalciferol Take  by mouth daily. Prescriptions Sent to Pharmacy Refills  
 metoprolol succinate (TOPROL-XL) 50 mg XL tablet 3 Sig: Take 1 Tab by mouth daily. Class: Normal  
 Pharmacy: [id number] [place], [place] - [address] [facility] [place] Ph #: [phone] Route: Oral  
  
We Performed the Following AMB POC EKG ROUTINE W/ 12 LEADS, INTER & REP [00852 CPT(R)] Introducing hospitals & [facility] SERVICES! Meenakshi Cohen introduces [employer] patient portal. Now you can access parts of your medical record, email your doctor's office, and request medication refills online. 1. In your internet browser, go to [email or web address] 2. Click on the First Time User? Click Here link in the Sign In box. You will see the New Member Sign Up page. 3. Enter your [employer] Access Code exactly as it appears below. You will not need to use this code after youve completed the sign-up process. If you do not sign up before the expiration date, you must request a new code. · [employer] Access Code: [email or web address] 
Expires: 8/22/2018  1:48 PM 
 
4. Enter the last four digits of your Social Security Number (xxxx) and Date of Birth (mm/dd/yyyy) as indicated and click Submit. You will be taken to the next sign-up page. 5. Create a [employer] ID. This will be your [employer] login ID and cannot be changed, so think of one that is secure and easy to remember. 6. Create a [employer] password. You can change your password at any time. 7. Enter your Password Reset Question and Answer.  This can be used at a later time if you forget your password. 8. Enter your e-mail address. You will receive e-mail notification when new information is available in [address] 9. Click Sign Up. You can now view and download portions of your medical record. 10. Click the Download Summary menu link to download a portable copy of your medical information. If you have questions, please visit the Frequently Asked Questions section of the [employer] website. Remember, [employer] is NOT to be used for urgent needs. For medical emergencies, dial 911. Now available from your iPhone and Android! Please provide this summary of care documentation to your next provider. Your primary care clinician is listed as Thayne Eisenmenger. If you have any questions after today's visit, please call [phone].

## 2018-08-20 DIAGNOSIS — I50.32 CHRONIC DIASTOLIC HEART FAILURE (HCC): ICD-10-CM

## 2018-08-20 RX ORDER — TORSEMIDE 10 MG/1
10 TABLET ORAL DAILY
Qty: 90 TAB | Refills: 3 | Status: SHIPPED | OUTPATIENT
Start: 2018-08-20 | End: 2019-09-04 | Stop reason: SDUPTHER

## 2018-08-20 NOTE — TELEPHONE ENCOUNTER
Pharmacy verified. Pt is almost out of medication. Requested Prescriptions     Pending Prescriptions Disp Refills    torsemide (DEMADEX) 10 mg tablet 90 Tab 3     Sig: Take 1 Tab by mouth daily.      Thanks

## 2018-09-07 ENCOUNTER — APPOINTMENT (OUTPATIENT)
Dept: GENERAL RADIOLOGY | Age: 83
DRG: 418 | End: 2018-09-07
Attending: EMERGENCY MEDICINE
Payer: MEDICARE

## 2018-09-07 ENCOUNTER — ANESTHESIA EVENT (OUTPATIENT)
Dept: SURGERY | Age: 83
DRG: 418 | End: 2018-09-07
Payer: MEDICARE

## 2018-09-07 ENCOUNTER — APPOINTMENT (OUTPATIENT)
Dept: CT IMAGING | Age: 83
DRG: 418 | End: 2018-09-07
Attending: PHYSICIAN ASSISTANT
Payer: MEDICARE

## 2018-09-07 ENCOUNTER — HOSPITAL ENCOUNTER (INPATIENT)
Age: 83
LOS: 5 days | Discharge: NURSING, REHAB OR LONG-TERM CARE | DRG: 418 | End: 2018-09-12
Attending: EMERGENCY MEDICINE | Admitting: SURGERY
Payer: MEDICARE

## 2018-09-07 DIAGNOSIS — N39.0 URINARY TRACT INFECTION WITHOUT HEMATURIA, SITE UNSPECIFIED: ICD-10-CM

## 2018-09-07 DIAGNOSIS — N17.9 ACUTE KIDNEY INJURY (HCC): ICD-10-CM

## 2018-09-07 DIAGNOSIS — K81.0 ACUTE CHOLECYSTITIS: Primary | ICD-10-CM

## 2018-09-07 DIAGNOSIS — R77.8 ELEVATED TROPONIN: ICD-10-CM

## 2018-09-07 PROBLEM — N18.30 CKD (CHRONIC KIDNEY DISEASE), STAGE III (HCC): Chronic | Status: ACTIVE | Noted: 2017-11-16

## 2018-09-07 PROBLEM — E66.9 OBESITY (BMI 30.0-34.9): Chronic | Status: ACTIVE | Noted: 2018-09-07

## 2018-09-07 PROBLEM — K81.9 CHOLECYSTITIS: Status: ACTIVE | Noted: 2018-09-07

## 2018-09-07 LAB
ALBUMIN SERPL-MCNC: 2.9 G/DL (ref 3.5–5)
ALBUMIN/GLOB SERPL: 0.7 {RATIO} (ref 1.1–2.2)
ALP SERPL-CCNC: 148 U/L (ref 45–117)
ALT SERPL-CCNC: 25 U/L (ref 12–78)
ANION GAP SERPL CALC-SCNC: 11 MMOL/L (ref 5–15)
APPEARANCE UR: ABNORMAL
AST SERPL-CCNC: 24 U/L (ref 15–37)
BACTERIA URNS QL MICRO: ABNORMAL /HPF
BASOPHILS # BLD: 0.2 K/UL (ref 0–0.1)
BASOPHILS NFR BLD: 1 % (ref 0–1)
BILIRUB SERPL-MCNC: 1 MG/DL (ref 0.2–1)
BILIRUB UR QL CFM: NEGATIVE
BUN SERPL-MCNC: 27 MG/DL (ref 6–20)
BUN/CREAT SERPL: 16 (ref 12–20)
CALCIUM SERPL-MCNC: 9 MG/DL (ref 8.5–10.1)
CHLORIDE SERPL-SCNC: 99 MMOL/L (ref 97–108)
CO2 SERPL-SCNC: 27 MMOL/L (ref 21–32)
COLOR UR: ABNORMAL
COMMENT, HOLDF: NORMAL
CREAT SERPL-MCNC: 1.67 MG/DL (ref 0.55–1.02)
DIFFERENTIAL METHOD BLD: ABNORMAL
EOSINOPHIL # BLD: 0 K/UL (ref 0–0.4)
EOSINOPHIL NFR BLD: 0 % (ref 0–7)
EPITH CASTS URNS QL MICRO: ABNORMAL /LPF
ERYTHROCYTE [DISTWIDTH] IN BLOOD BY AUTOMATED COUNT: 13.3 % (ref 11.5–14.5)
GLOBULIN SER CALC-MCNC: 4.2 G/DL (ref 2–4)
GLUCOSE SERPL-MCNC: 102 MG/DL (ref 65–100)
GLUCOSE UR STRIP.AUTO-MCNC: NEGATIVE MG/DL
HCT VFR BLD AUTO: 40.7 % (ref 35–47)
HGB BLD-MCNC: 13.2 G/DL (ref 11.5–16)
HGB UR QL STRIP: NEGATIVE
IMM GRANULOCYTES # BLD: 0 K/UL
IMM GRANULOCYTES NFR BLD AUTO: 0 %
KETONES UR QL STRIP.AUTO: ABNORMAL MG/DL
LACTATE SERPL-SCNC: 1.6 MMOL/L (ref 0.4–2)
LEUKOCYTE ESTERASE UR QL STRIP.AUTO: ABNORMAL
LIPASE SERPL-CCNC: 100 U/L (ref 73–393)
LYMPHOCYTES # BLD: 0.8 K/UL (ref 0.8–3.5)
LYMPHOCYTES NFR BLD: 4 % (ref 12–49)
MAGNESIUM SERPL-MCNC: 2.3 MG/DL (ref 1.6–2.4)
MCH RBC QN AUTO: 31.1 PG (ref 26–34)
MCHC RBC AUTO-ENTMCNC: 32.4 G/DL (ref 30–36.5)
MCV RBC AUTO: 95.8 FL (ref 80–99)
MONOCYTES # BLD: 1.2 K/UL (ref 0–1)
MONOCYTES NFR BLD: 6 % (ref 5–13)
NEUTS BAND NFR BLD MANUAL: 4 % (ref 0–6)
NEUTS SEG # BLD: 17.6 K/UL (ref 1.8–8)
NEUTS SEG NFR BLD: 85 % (ref 32–75)
NITRITE UR QL STRIP.AUTO: NEGATIVE
NRBC # BLD: 0 K/UL (ref 0–0.01)
NRBC BLD-RTO: 0 PER 100 WBC
PH UR STRIP: 5 [PH] (ref 5–8)
PLATELET # BLD AUTO: 227 K/UL (ref 150–400)
PMV BLD AUTO: 10.7 FL (ref 8.9–12.9)
POTASSIUM SERPL-SCNC: 4.5 MMOL/L (ref 3.5–5.1)
PROT SERPL-MCNC: 7.1 G/DL (ref 6.4–8.2)
PROT UR STRIP-MCNC: ABNORMAL MG/DL
RBC # BLD AUTO: 4.25 M/UL (ref 3.8–5.2)
RBC #/AREA URNS HPF: ABNORMAL /HPF (ref 0–5)
RBC MORPH BLD: ABNORMAL
SAMPLES BEING HELD,HOLD: NORMAL
SODIUM SERPL-SCNC: 137 MMOL/L (ref 136–145)
SP GR UR REFRACTOMETRY: 1.02 (ref 1–1.03)
TROPONIN I SERPL-MCNC: 0.08 NG/ML
UR CULT HOLD, URHOLD: NORMAL
UROBILINOGEN UR QL STRIP.AUTO: 1 EU/DL (ref 0.2–1)
WBC # BLD AUTO: 19.8 K/UL (ref 3.6–11)
WBC URNS QL MICRO: ABNORMAL /HPF (ref 0–4)

## 2018-09-07 PROCEDURE — 96360 HYDRATION IV INFUSION INIT: CPT

## 2018-09-07 PROCEDURE — 81001 URINALYSIS AUTO W/SCOPE: CPT | Performed by: EMERGENCY MEDICINE

## 2018-09-07 PROCEDURE — 93005 ELECTROCARDIOGRAM TRACING: CPT

## 2018-09-07 PROCEDURE — 77030011943

## 2018-09-07 PROCEDURE — 74018 RADEX ABDOMEN 1 VIEW: CPT

## 2018-09-07 PROCEDURE — 74011250636 HC RX REV CODE- 250/636: Performed by: PHYSICIAN ASSISTANT

## 2018-09-07 PROCEDURE — 74011000258 HC RX REV CODE- 258: Performed by: PHYSICIAN ASSISTANT

## 2018-09-07 PROCEDURE — 87186 SC STD MICRODIL/AGAR DIL: CPT | Performed by: PHYSICIAN ASSISTANT

## 2018-09-07 PROCEDURE — 87040 BLOOD CULTURE FOR BACTERIA: CPT | Performed by: PHYSICIAN ASSISTANT

## 2018-09-07 PROCEDURE — 74011250636 HC RX REV CODE- 250/636: Performed by: SURGERY

## 2018-09-07 PROCEDURE — 83690 ASSAY OF LIPASE: CPT | Performed by: EMERGENCY MEDICINE

## 2018-09-07 PROCEDURE — 99284 EMERGENCY DEPT VISIT MOD MDM: CPT

## 2018-09-07 PROCEDURE — 74011000258 HC RX REV CODE- 258: Performed by: SURGERY

## 2018-09-07 PROCEDURE — 65270000029 HC RM PRIVATE

## 2018-09-07 PROCEDURE — 87086 URINE CULTURE/COLONY COUNT: CPT | Performed by: PHYSICIAN ASSISTANT

## 2018-09-07 PROCEDURE — 83735 ASSAY OF MAGNESIUM: CPT | Performed by: PHYSICIAN ASSISTANT

## 2018-09-07 PROCEDURE — 87077 CULTURE AEROBIC IDENTIFY: CPT | Performed by: PHYSICIAN ASSISTANT

## 2018-09-07 PROCEDURE — 80053 COMPREHEN METABOLIC PANEL: CPT | Performed by: EMERGENCY MEDICINE

## 2018-09-07 PROCEDURE — 84484 ASSAY OF TROPONIN QUANT: CPT | Performed by: PHYSICIAN ASSISTANT

## 2018-09-07 PROCEDURE — 85025 COMPLETE CBC W/AUTO DIFF WBC: CPT | Performed by: EMERGENCY MEDICINE

## 2018-09-07 PROCEDURE — 36415 COLL VENOUS BLD VENIPUNCTURE: CPT | Performed by: PHYSICIAN ASSISTANT

## 2018-09-07 PROCEDURE — 74176 CT ABD & PELVIS W/O CONTRAST: CPT

## 2018-09-07 PROCEDURE — 83605 ASSAY OF LACTIC ACID: CPT | Performed by: PHYSICIAN ASSISTANT

## 2018-09-07 RX ORDER — NALOXONE HYDROCHLORIDE 0.4 MG/ML
0.4 INJECTION, SOLUTION INTRAMUSCULAR; INTRAVENOUS; SUBCUTANEOUS AS NEEDED
Status: DISCONTINUED | OUTPATIENT
Start: 2018-09-07 | End: 2018-09-12 | Stop reason: HOSPADM

## 2018-09-07 RX ORDER — SODIUM CHLORIDE 0.9 % (FLUSH) 0.9 %
5-10 SYRINGE (ML) INJECTION EVERY 8 HOURS
Status: DISCONTINUED | OUTPATIENT
Start: 2018-09-07 | End: 2018-09-12 | Stop reason: HOSPADM

## 2018-09-07 RX ORDER — ONDANSETRON 2 MG/ML
4 INJECTION INTRAMUSCULAR; INTRAVENOUS
Status: DISCONTINUED | OUTPATIENT
Start: 2018-09-07 | End: 2018-09-12 | Stop reason: HOSPADM

## 2018-09-07 RX ORDER — LEVOTHYROXINE SODIUM 150 UG/1
75 TABLET ORAL
COMMUNITY
End: 2018-10-01

## 2018-09-07 RX ORDER — MORPHINE SULFATE 4 MG/ML
2 INJECTION, SOLUTION INTRAMUSCULAR; INTRAVENOUS
Status: DISCONTINUED | OUTPATIENT
Start: 2018-09-07 | End: 2018-09-12 | Stop reason: HOSPADM

## 2018-09-07 RX ORDER — ACETAMINOPHEN 325 MG/1
650 TABLET ORAL
Status: DISCONTINUED | OUTPATIENT
Start: 2018-09-07 | End: 2018-09-12 | Stop reason: HOSPADM

## 2018-09-07 RX ORDER — MINERAL OIL
180 ENEMA (ML) RECTAL
COMMUNITY
End: 2018-10-01

## 2018-09-07 RX ORDER — DOCUSATE SODIUM 100 MG/1
100 CAPSULE, LIQUID FILLED ORAL DAILY
COMMUNITY
End: 2018-10-01

## 2018-09-07 RX ORDER — SODIUM CHLORIDE 0.9 % (FLUSH) 0.9 %
5-10 SYRINGE (ML) INJECTION AS NEEDED
Status: DISCONTINUED | OUTPATIENT
Start: 2018-09-07 | End: 2018-09-12 | Stop reason: HOSPADM

## 2018-09-07 RX ORDER — METOPROLOL SUCCINATE 50 MG/1
50 TABLET, EXTENDED RELEASE ORAL
COMMUNITY
End: 2019-05-27 | Stop reason: SDUPTHER

## 2018-09-07 RX ORDER — NYSTATIN 100000 [USP'U]/G
POWDER TOPICAL
COMMUNITY
End: 2018-10-01

## 2018-09-07 RX ORDER — TRIAMCINOLONE ACETONIDE 1 MG/G
CREAM TOPICAL
COMMUNITY
End: 2018-10-01

## 2018-09-07 RX ORDER — METOPROLOL SUCCINATE 50 MG/1
50 TABLET, EXTENDED RELEASE ORAL
Status: DISCONTINUED | OUTPATIENT
Start: 2018-09-08 | End: 2018-09-10

## 2018-09-07 RX ORDER — DICLOFENAC SODIUM 10 MG/G
2 GEL TOPICAL 2 TIMES WEEKLY
COMMUNITY
End: 2018-10-01

## 2018-09-07 RX ORDER — HEPARIN SODIUM 5000 [USP'U]/ML
5000 INJECTION, SOLUTION INTRAVENOUS; SUBCUTANEOUS EVERY 8 HOURS
Status: DISCONTINUED | OUTPATIENT
Start: 2018-09-07 | End: 2018-09-09

## 2018-09-07 RX ORDER — DEXTROSE, SODIUM CHLORIDE, AND POTASSIUM CHLORIDE 5; .45; .15 G/100ML; G/100ML; G/100ML
50 INJECTION INTRAVENOUS CONTINUOUS
Status: DISCONTINUED | OUTPATIENT
Start: 2018-09-07 | End: 2018-09-08

## 2018-09-07 RX ORDER — LEVOTHYROXINE SODIUM 150 UG/1
150 TABLET ORAL
COMMUNITY
End: 2018-10-09 | Stop reason: SDUPTHER

## 2018-09-07 RX ADMIN — DEXTROSE MONOHYDRATE, SODIUM CHLORIDE, AND POTASSIUM CHLORIDE 100 ML/HR: 50; 4.5; 1.49 INJECTION, SOLUTION INTRAVENOUS at 22:34

## 2018-09-07 RX ADMIN — SODIUM CHLORIDE 1000 ML: 900 INJECTION, SOLUTION INTRAVENOUS at 17:40

## 2018-09-07 RX ADMIN — SODIUM CHLORIDE 1000 ML: 900 INJECTION, SOLUTION INTRAVENOUS at 19:35

## 2018-09-07 RX ADMIN — Medication 10 ML: at 22:34

## 2018-09-07 RX ADMIN — CEFOXITIN SODIUM 2 G: 2 POWDER, FOR SOLUTION INTRAVENOUS at 18:40

## 2018-09-07 RX ADMIN — HEPARIN SODIUM 5000 UNITS: 5000 INJECTION INTRAVENOUS; SUBCUTANEOUS at 18:49

## 2018-09-07 RX ADMIN — PIPERACILLIN SODIUM, TAZOBACTAM SODIUM 3.38 G: 3; .375 INJECTION, POWDER, LYOPHILIZED, FOR SOLUTION INTRAVENOUS at 20:01

## 2018-09-07 NOTE — PROGRESS NOTES
[facility] Dosing Services: 9/7/18 Pharmacist Renal Dosing Progress Note for Zosyn Physician: Dr. Gino Montanez The following medication: Zosyn was automatically dose-adjusted per [facility] P&T Committee Protocol, with respect to renal function. Consult provided for this   80 y.o. , female , for the indication of intra-abdominal infection. Pt Weight:  
Wt Readings from Last 1 Encounters:  
09/07/18 97.1 kg (214 lb) Dosage changed to: Zosyn 3.375 gm IV x 1 now over 30 minutes, followed by Q8H over 240 minutes thereafter Additional notes: CrCl ~30 mL/min Previous Regimen  Zosyn 3.375 gm IV Q6H Serum Creatinine Lab Results Component Value Date/Time Creatinine 1.67 (H) 09/07/2018 03:11 PM  
 Creatinine (POC) 1.2 07/06/2012 12:57 PM  
   
Creatinine Clearance Estimated Creatinine Clearance: 30.5 mL/min (based on Cr of 1.67). BUN Lab Results Component Value Date/Time BUN 27 (H) 09/07/2018 03:11 PM  
 BUN (POC) 17 05/11/2012 09:58 PM  
   
 
Pharmacy to continue to monitor patient daily. Will make dosage adjustments based upon changing renal function. Thank you, 
Gerber JEREZ, [address]

## 2018-09-07 NOTE — ED NOTES
Bedside and Verbal shift change report given to Nedra Bynum RN (oncoming nurse) by Avni Beckford RN (offgoing nurse). Report included the following information SBAR, ED Summary, Procedure Summary, MAR, Recent Results, Med Rec Status and Cardiac Rhythm NSR.

## 2018-09-07 NOTE — PROGRESS NOTES
[facility]: MED RECONCILIATION Comments/Recommendations:  
? Patient reports compliance with current medications and utilizes a pill box to manages meds. ? Last doses were taken earlier this morning and today around 1230. ? Alendronate - Patient states that she was prescribed this, but read the side effects and never took it. Advised patient to at least discuss risk vs benefit with her provider so that she can make an informed decision. ? Removed Zosyn allergy - It appears that patient had it from 5/13/2012 to 5/23/2012 without any apparent issues. She does not recall why that allergy was entered, but thinks that she was told she had an allergy to Cipro. Medications added:  
 
· none Medications removed: · Alendronate Medications adjusted: · Tylenol Arthritis - dose recently decreased from 2 tabs to 1 tab PO BID · Preservision - changed from daily to BID · Voltaren gel - only uses about 1-2 times a week for knee pain as needed · Docusate - dose recently reduced to 100 mg PO daily instead of twice daily because of loose stools · Allegra - changed to PRN  
· Hydrocortisone solution - changed to PRN scalp itching · Nystatin powder - changed to PRN 
· Triamcinolone cream - changed to PRN Information obtained from: patient, medication list provided for review, Rx query Significant PMH/Disease States:  
Past Medical History:  
Diagnosis Date  Anemia   
 hx b12 def age 22-31s  COPD (chronic obstructive pulmonary disease) ([facility] [address] 7/2011 FEV1 1 L 7/2011. Dr. Murali Floyd  Diastolic dysfunction 3/79/3129  GERD (gastroesophageal reflux disease)  Glaucoma suspect of both eyes Dr Bailee Almanza  Hemorrhoids  Migraine  Mixed stress and urge urinary incontinence  OA (osteoarthritis) of knee R, Dr. Gely Moss. Euflexa injections x3. has walker  Osteoporosis  Other postablative hypothyroidism 1987 ROSAS 1987  saw Dr. Sharon Ramos  Paresthesia of foot, bilateral   
  Psoriasis   
 left ear  PSVT (paroxysmal supraventricular tachycardia) ([facility] [address] 5/19/2012 Dr. Shannon Martell  Pulmonary nodules 7/2011 RLL x2.  stable 2/2012  Dr. 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 Taking? Vit A,C,E-Zinc-Copper (PRESERVISION AREDS) cap capsule 9/7/2018 at am Self Yes Yes Sig: Take 1 Cap by mouth two (2) times a day. acetaminophen (TYLENOL ARTHRITIS PAIN) 650 mg TbER 9/7/2018 at am Self Yes Yes Sig: Take 650 mg by mouth two (2) times a day. albuterol (PROVENTIL HFA) 90 mcg/actuation inhaler 9/6/2018 at Unknown time Self No Yes Sig: Take 2 Puffs by inhalation every four (4) hours as needed for Wheezing. aspirin delayed-release (ASPIR-81) 81 mg tablet 9/7/2018 at am Self Yes Yes Sig: Take 81 mg by mouth daily. cholecalciferol (VITAMIN D3) 1,000 unit cap 9/7/2018 at am Self Yes Yes Sig: Take 1,000 Units by mouth daily. diclofenac (VOLTAREN) 1 % gel 8/31/2018 at Unknown time Self Yes Yes Sig: Apply 2 g to affected area two (2) times a week. Indications: OSTEOARTHRITIS OF THE KNEE  
docusate sodium (COLACE) 100 mg capsule 9/7/2018 at am Self Yes Yes Sig: Take 100 mg by mouth daily. fexofenadine (ALLEGRA) 180 mg tablet  Self Yes Yes Sig: Take 180 mg by mouth daily as needed for Allergies. hydrocortisone (SCALPICIN ANTI-ITCH) 1 % soln 8/31/2018 at Unknown time Self Yes Yes Sig: by Apply Externally route daily as needed. levothyroxine (SYNTHROID) 150 mcg tablet 9/7/2018 at am Self Yes Yes Sig: Take 150 mcg by mouth six (6) days a week. Monday - Saturday = 150 mcg Sunday = 75 mcg (1/2 of a 150 mcg tablet) levothyroxine (SYNTHROID) 150 mcg tablet 9/2/2018 at am Self Yes Yes Sig: Take 75 mcg by mouth every Sunday. Monday - Saturday = 150 mcg Sunday = 75 mcg (1/2 of a 150 mcg tablet)  
metoprolol succinate (TOPROL-XL) 50 mg XL tablet 9/7/2018 at 1230 Self Yes Yes Sig: Take 50 mg by mouth daily (after lunch). nystatin (MYCOSTATIN) powder  Self Yes Yes Sig: Apply  to affected area daily as needed. torsemide (DEMADEX) 10 mg tablet 9/7/2018 at am Self No Yes Sig: Take 1 Tab by mouth daily. triamcinolone acetonide (KENALOG) 0.1 % topical cream  Self Yes Yes Sig: Apply  to affected area two (2) times daily as needed for Skin Irritation. use thin layer Facility-Administered Medications: None Thank you for the consult, 
Gerber Stroud D, [address]

## 2018-09-07 NOTE — ED NOTES
Straight cath procedure completed using sterile procedures. 50 mls of dark yellow cloudy urine returned, specimen to lab.

## 2018-09-07 NOTE — H&P
Surgery History and Physical 
 
Subjective:  
  
Marian King is a 80 y.o. female who presents for evaluation of malaise and abd pain. The pain is described as mild and is 5/10 in intensity. Onset was 2 days ago. Symptoms have been gradually worsening since. Aggravating factors: eating or movement. Alleviating factors: none. Associated symptoms: fever and chills. The patient denies diarrhea, cough, urinary symptoms. Jasmeet Melgar Past Medical History:  
Diagnosis Date  Anemia   
 hx b12 def age 22-31s  COPD (chronic obstructive pulmonary disease) ([facility] [address] 7/2011 FEV1 1 L 7/2011. Dr. Isidro Rajput  Diastolic dysfunction 5/60/3546  GERD (gastroesophageal reflux disease)  Glaucoma suspect of both eyes Dr Marceil Aase  Hemorrhoids  Migraine  Mixed stress and urge urinary incontinence  OA (osteoarthritis) of knee R, Dr. Lalitha Townsend. Euflexa injections x3. has walker  Osteoporosis  Other postablative hypothyroidism 1987 ROSAS 1987  saw Dr. Ashley Esparza  Paresthesia of foot, bilateral   
 Psoriasis   
 left ear  PSVT (paroxysmal supraventricular tachycardia) ([facility] [address] 5/19/2012 Dr. Cr Kerr  Pulmonary nodules 7/2011 RLL x2.  stable 2/2012  Dr. 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  
Procedure Laterality Date  ECHO 2D ADULT  7/15/11  
 mild LVH, EF 65%, normal atrial sizes, indeterminate diastolic function, no sig valvular disease, RVSP 41  
 ECHO 2D ADULT  5/19/12  
 mild LVH, EF 55-60%  HX CATARACT REMOVAL Dr. Marceil Aase, bilaterally  HX COLONOSCOPY  6/28/12  
 2 polyps. Dr. Mayi Owens  HX HYSTERECTOMY  1974  
 menorrhagia  HX OTHER SURGICAL Echo 7/31/14 - LVEF 60-65%, grade 1 DD  HX TONSILLECTOMY  NM CARDIAC SPECT W STRS/REST MULT  7/18/11  
 small sized fixed defect in inferolateral wall may represent scar or abd attenuation; EF 80%  VAS LOWER EXT ART PVR MULT LEVEL SEG PRESSURES  11  
 normal  
  
Family History Problem Relation Age of Onset  Cancer Maternal Grandmother   
  breast  
 Cancer Sister   
  lymphopma  Heart Disease Father  Heart Disease Paternal Grandfather  Seizures Sister [facility] Arthritis-osteo Mother  Dementia Mother  Malignant Hyperthermia Neg Hx  Pseudocholinesterase Deficiency Neg Hx  Delayed Awakening Neg Hx  Post-op Nausea/Vomiting Neg Hx  Emergence Delirium Neg Hx  Post-op Cognitive Dysfunction Neg Hx   
 Other Neg Hx Social History Social History  Marital status:  Spouse name:   Number of children: 1  Years of education: N/A Occupational History  Retired Social History Main Topics  Smoking status: Former Smoker Packs/day: 1.00 Years: 60.00 Types: Cigarettes Quit date: 2011  Smokeless tobacco: Never Used  Alcohol use No  
 Drug use: No  
 Sexual activity: No  
 
Other Topics Concern  Not on file Social History Narrative Current Facility-Administered Medications Medication Dose Route Frequency  sodium chloride 0.9 % bolus infusion 1,000 mL  1,000 mL IntraVENous NOW  sodium chloride 0.9 % bolus infusion 1,000 mL  1,000 mL IntraVENous NOW  iopamidol (ISOVUE-370) 76 % injection 100 mL  100 mL IntraVENous RAD ONCE  
 cefOXitin (MEFOXIN) 2 g in 0.9% sodium chloride (MBP/ADV) 50 mL  2 g IntraVENous NOW  sodium chloride (NS) flush 5-10 mL  5-10 mL IntraVENous Q8H  
 sodium chloride (NS) flush 5-10 mL  5-10 mL IntraVENous PRN  
 naloxone (NARCAN) injection 0.4 mg  0.4 mg IntraVENous PRN  
 acetaminophen (TYLENOL) tablet 650 mg  650 mg Oral Q6H PRN  
 morphine injection 2 mg  2 mg IntraVENous Q4H PRN  
 heparin (porcine) injection 5,000 Units  5,000 Units SubCUTAneous Q8H  
 ondansetron (ZOFRAN) injection 4 mg  4 mg IntraVENous Q4H PRN  
  piperacillin-tazobactam (ZOSYN) 3.375 g in 0.9% sodium chloride (MBP/ADV) 100 mL MBP  3.375 g IntraVENous Q6H  
 dextrose 5% - 0.45% NaCl with KCl 20 mEq/L infusion  100 mL/hr IntraVENous CONTINUOUS Current Outpatient Prescriptions Medication Sig  Vit A,C,E-Zinc-Copper (PRESERVISION AREDS) cap capsule Take 1 Cap by mouth two (2) times a day.  diclofenac (VOLTAREN) 1 % gel Apply 2 g to affected area two (2) times a week. Indications: OSTEOARTHRITIS OF THE KNEE  
 docusate sodium (COLACE) 100 mg capsule Take 100 mg by mouth daily.  fexofenadine (ALLEGRA) 180 mg tablet Take 180 mg by mouth daily as needed for Allergies.  metoprolol succinate (TOPROL-XL) 50 mg XL tablet Take 50 mg by mouth daily (after lunch).  nystatin (MYCOSTATIN) powder Apply  to affected area daily as needed.  levothyroxine (SYNTHROID) 150 mcg tablet Take 150 mcg by mouth six (6) days a week. Monday - Saturday = 150 mcg Sunday = 75 mcg (1/2 of a 150 mcg tablet)  levothyroxine (SYNTHROID) 150 mcg tablet Take 75 mcg by mouth every Sunday. Monday - Saturday = 150 mcg Sunday = 75 mcg (1/2 of a 150 mcg tablet)  triamcinolone acetonide (KENALOG) 0.1 % topical cream Apply  to affected area two (2) times daily as needed for Skin Irritation. use thin layer  hydrocortisone (SCALPICIN ANTI-ITCH) 1 % soln by Apply Externally route daily as needed.  torsemide (DEMADEX) 10 mg tablet Take 1 Tab by mouth daily.  acetaminophen (TYLENOL ARTHRITIS PAIN) 650 mg TbER Take 650 mg by mouth two (2) times a day.  cholecalciferol (VITAMIN D3) 1,000 unit cap Take 1,000 Units by mouth daily.  albuterol (PROVENTIL HFA) 90 mcg/actuation inhaler Take 2 Puffs by inhalation every four (4) hours as needed for Wheezing.  aspirin delayed-release (ASPIR-81) 81 mg tablet Take 81 mg by mouth daily. Allergies Allergen Reactions  Ciprofloxacin (Bulk) Rash  Diltiazem Rash  Piperacillin Other (comments)  Tazobactam Other (comments) Review of Systems:   
 []     Unable to obtain  ROS due to  []    mental status change  []    sedated   []    intubated 
 [x]    Total of 12 systems reviewed as follows: 
Constitutional: negative fever, negative chills, negative weight loss Eyes:   negative visual changes ENT:   negative sore throat, tongue or lip swelling Respiratory:  negative cough, negative dyspnea Cards:  negative for chest pain, palpitations, lower extremity edema GI:   Positive  for nausea, vomiting,, and abdominal pain :  negative for frequency, dysuria Integument:  negative for rash and pruritus Heme:  negative for easy bruising and gum/nose bleeding Musculoskel: negative for myalgias,  back pain and muscle weakness Neuro:  negative for headaches, dizziness, vertigo Psych:  negative for feelings of anxiety, depression Objective:  
 
 Patient Vitals for the past 8 hrs: 
 BP Temp Pulse Resp SpO2 Height Weight 18 1800 109/55 - - - 93 % - -  
18 1745 116/47 - - - 91 % - -  
18 1453 104/58 99.4 °F (37.4 °C) 95 18 94 % 5' 10\" (1.778 m) 97.1 kg (214 lb) Temp (24hrs), Av.4 °F (37.4 °C), Min:99.4 °F (37.4 °C), Max:99.4 °F (37.4 °C) Physical Exam: 
General:  Alert, cooperative, no distress, appears stated age. Eyes:  Conjunctivae/corneas clear. PERRL, EOMs intact. Nose: Nares normal. Septum midline. Mucosa normal. No drainage or sinus tenderness. Mouth/Throat: Lips, mucosa, and tongue normal. Teeth and gums normal.  
Neck: Supple, symmetrical, trachea midline, no adenopathy, thyroid: no enlargment/tenderness/nodules, no carotid bruit and no JVD. Back:   Symmetric, no curvature. ROM normal. No CVA tenderness. Lungs:   Clear to auscultation bilaterally. Heart:  Regular rate and rhythm, S1, S2 normal, no murmur, click, rub or gallop. Abdomen:   Soft, non-tender. Bowel sounds normal. No masses,  No organomegaly. Extremities: Extremities normal, atraumatic, no cyanosis or edema. Pulses: 2+ and symmetric all extremities. Skin: Skin color, texture, turgor normal. No rashes or lesions Lymph nodes: Cervical, supraclavicular, and axillary nodes normal.  
 
Labs: Recent Labs  
   09/07/18 
 1511 WBC  19.8* HGB  13.2 HCT  40.7 PLT  227 Recent Labs  
   09/07/18 
 1721  09/07/18 
 1511 NA   --   137 K   --   4.5  
CL   --   99  
CO2   --   27  
GLU   --   102* BUN   --   27* CREA   --   1.67* CA   --   9.0 MG  2.3   --   
ALB   --   2.9* TBILI   --   1.0 SGOT   --   24 ALT   --   25 No results for input(s): INR in the last 72 hours. No lab exists for component: INREXT  
 
CT scan was reviewed and this shows findings consistent with cholecystitis. Assessment:  
 
Acute cholecystitis Plan:  
 
Discussed the risk of surgery including bleeding, infection, DVT, Pulmonary embolism,  Conversion to open operation,  and the risks of general anesthetic. The patient understands the risks; any and all questions were answered to the patient's satisfaction. I will have Dr Khanh Gambino assess her from her cardiac standpoint. I will start her on zozyn and continue that She is tentatively scheduled for surgery for 9 am tomorrow. Signed By: Tamiko Stockton MD   
 September 7, 2018

## 2018-09-07 NOTE — CONSULTS
[address]  [address] [place] [address]  [phone]    Hospitalist Consult Note      NAME:  Sienna Pardo   :   1932   MRN:  [id number]     Requesting Physician Donna Price MD   Reason for Consult:  Medical management      PCP:  Sam Delgado MD     Date/Time:  2018 6:40 PM       Assessment:      Active Problems:    Hypothyroidism ()      Overview: hx ROSAS for hyperthyroidism      Diastolic heart failure ([address] (2013)      CKD (chronic kidney disease), stage III (2017)      Cholecystitis (2018)      ARF (acute renal failure) ([address] (2018)      Obesity (BMI 30.0-34.9) (2018)            Plan: Active Problems:    Hypothyroidism ()   - NPO now, can hold LT4 for several days without resorting to IV LT4   - follow      Diastolic heart failure ([address] (2013), chronic   - agree she needs IV fluids now, but would be careful with them   - BP a little on the low side, but since she is on a beta blocker, it would be better to continue to administer it perioperatively   - hold torsemide as below   - Cardiology has also been consulted to comment on her perioperative cardiac risk and can further manager her CHF      ARF (acute renal failure) ([address] (2018) on CKD (chronic kidney disease), stage III (2017)   - baseline creatinine ~1.1, today's value is up >0.5 from baseline   - follow on IV fluids   - hold torsemide      Cholecystitis (2018)   - management per General Surgery, on IV antibiotics with plan to go for cholecystectomy in AM        Obesity (BMI 30.0-34.9) (2018)   - counseled on weight loss        Code Status   - patient is FULL CODE, no AMD on file, daughter is her surrogate              Subjective:     CHIEF COMPLAINT: pain     HISTORY OF PRESENT ILLNESS:     Ms. Kathy Ye is a 80 y.o.    female who is admitted to the General Surgery Service with acute cholecystitis for cholecystectomy in AM.  We are asked to evaluate for medical management. Ms. Marce Fair is complaining of pain: RUQ, moderate, intermittent, associated with fever and chills      Past Medical History:   Diagnosis Date    Anemia     hx b12 def age 22-31s    COPD (chronic obstructive pulmonary disease) ([facility] [address] 7/2011    FEV1 1 L 7/2011. Dr. Michelle Mckoy Diastolic dysfunction 9/93/1763    GERD (gastroesophageal reflux disease)     Glaucoma suspect of both eyes     Dr Jose Stafford    Hemorrhoids     Migraine     Mixed stress and urge urinary incontinence     OA (osteoarthritis) of knee     R, Dr. Dunham [place]. Euflexa injections x3. has walker    Obesity (BMI 30.0-34.9) 9/7/2018    Osteoporosis     Other postablative hypothyroidism 1987    ROSAS 1987  saw Dr. Yoon Peñaloza Paresthesia of foot, bilateral     Psoriasis     left ear    PSVT (paroxysmal supraventricular tachycardia) ([facility] [address] 5/19/2012    Dr. Carla Miller Pulmonary nodules 7/2011    RLL x2.  stable 2/2012  Dr. 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 Garcia Nearing, bilaterally    HX COLONOSCOPY  6/28/12    2 polyps.  Dr. Pieter Gipson    menorrhagia    HX OTHER SURGICAL      Echo 7/31/14 - LVEF 60-65%, grade 1 DD    HX TONSILLECTOMY      NM CARDIAC SPECT W STRS/REST MULT  7/18/11    small sized fixed defect in inferolateral wall may represent scar or abd attenuation; EF 80%    VAS LOWER EXT ART PVR MULT LEVEL SEG PRESSURES  7/18/11    normal       Social History   Substance Use Topics    Smoking status: Former Smoker     Packs/day: 1.00     Years: 60.00     Types: Cigarettes     Quit date: 8/27/2011    Smokeless tobacco: Never Used    Alcohol use No        Family History   Problem Relation Age of Onset    Cancer Maternal Grandmother      breast    Cancer Sister      lymphopma    Heart Disease Father     Heart Disease Paternal Grandfather     Seizures Sister     Arthritis-osteo Mother     Dementia Mother     Malignant Hyperthermia Neg Hx     Pseudocholinesterase Deficiency Neg Hx     Delayed Awakening Neg Hx     Post-op Nausea/Vomiting Neg Hx     Emergence Delirium Neg Hx     Post-op Cognitive Dysfunction Neg Hx     Other Neg Hx         Allergies   Allergen Reactions    Ciprofloxacin (Bulk) Rash    Diltiazem Rash        Prior to Admission medications    Medication Sig Start Date End Date Taking? Authorizing Provider   Vit A,C,E-Zinc-Copper (PRESERVISION AREDS) cap capsule Take 1 Cap by mouth two (2) times a day. Yes Historical Provider   diclofenac (VOLTAREN) 1 % gel Apply 2 g to affected area two (2) times a week. Indications: OSTEOARTHRITIS OF THE KNEE   Yes Historical Provider   docusate sodium (COLACE) 100 mg capsule Take 100 mg by mouth daily. Yes Historical Provider   fexofenadine (ALLEGRA) 180 mg tablet Take 180 mg by mouth daily as needed for Allergies. Yes Historical Provider   metoprolol succinate (TOPROL-XL) 50 mg XL tablet Take 50 mg by mouth daily (after lunch). Yes Historical Provider   nystatin (MYCOSTATIN) powder Apply  to affected area daily as needed. Yes Historical Provider   levothyroxine (SYNTHROID) 150 mcg tablet Take 150 mcg by mouth six (6) days a week. Monday - Saturday = 150 mcg  Sunday = 75 mcg (1/2 of a 150 mcg tablet)   Yes Historical Provider   levothyroxine (SYNTHROID) 150 mcg tablet Take 75 mcg by mouth every Sunday. Monday - Saturday = 150 mcg  Sunday = 75 mcg (1/2 of a 150 mcg tablet)   Yes Historical Provider   triamcinolone acetonide (KENALOG) 0.1 % topical cream Apply  to affected area two (2) times daily as needed for Skin Irritation.  use thin layer   Yes Historical Provider   hydrocortisone (SCALPICIN ANTI-ITCH) 1 % soln by Apply Externally route daily as needed. Yes Historical Provider   torsemide (DEMADEX) 10 mg tablet Take 1 Tab by mouth daily. 8/20/18  Yes Yani Duggan MD   acetaminophen (TYLENOL ARTHRITIS PAIN) 650 mg TbER Take 650 mg by mouth two (2) times a day. Yes Historical Provider   cholecalciferol (VITAMIN D3) 1,000 unit cap Take 1,000 Units by mouth daily. Yes Historical Provider   albuterol (PROVENTIL HFA) 90 mcg/actuation inhaler Take 2 Puffs by inhalation every four (4) hours as needed for Wheezing. 3/6/14  Yes Francisco Castañeda MD   aspirin delayed-release (ASPIR-81) 81 mg tablet Take 81 mg by mouth daily.    Yes Historical Provider       Current Facility-Administered Medications   Medication Dose Route Frequency    sodium chloride 0.9 % bolus infusion 1,000 mL  1,000 mL IntraVENous NOW    iopamidol (ISOVUE-370) 76 % injection 100 mL  100 mL IntraVENous RAD ONCE    sodium chloride (NS) flush 5-10 mL  5-10 mL IntraVENous Q8H    sodium chloride (NS) flush 5-10 mL  5-10 mL IntraVENous PRN    naloxone (NARCAN) injection 0.4 mg  0.4 mg IntraVENous PRN    acetaminophen (TYLENOL) tablet 650 mg  650 mg Oral Q6H PRN    morphine injection 2 mg  2 mg IntraVENous Q4H PRN    heparin (porcine) injection 5,000 Units  5,000 Units SubCUTAneous Q8H    ondansetron (ZOFRAN) injection 4 mg  4 mg IntraVENous Q4H PRN    dextrose 5% - 0.45% NaCl with KCl 20 mEq/L infusion  100 mL/hr IntraVENous CONTINUOUS    [START ON 9/8/2018] metoprolol succinate (TOPROL-XL) XL tablet 50 mg  50 mg Oral PCL    piperacillin-tazobactam (ZOSYN) 3.375 g in 0.9% sodium chloride (MBP/ADV) 100 mL  3.375 g IntraVENous NOW    [START ON 9/8/2018] piperacillin-tazobactam (ZOSYN) 3.375 g in 0.9% sodium chloride (MBP/ADV) 100 mL  3.375 g IntraVENous Q8H     Current Outpatient Prescriptions   Medication Sig    Vit A,C,E-Zinc-Copper (PRESERVISION AREDS) cap capsule Take 1 Cap by mouth two (2) times a day.  diclofenac (VOLTAREN) 1 % gel Apply 2 g to affected area two (2) times a week. Indications: OSTEOARTHRITIS OF THE KNEE    docusate sodium (COLACE) 100 mg capsule Take 100 mg by mouth daily.  fexofenadine (ALLEGRA) 180 mg tablet Take 180 mg by mouth daily as needed for Allergies.  metoprolol succinate (TOPROL-XL) 50 mg XL tablet Take 50 mg by mouth daily (after lunch).  nystatin (MYCOSTATIN) powder Apply  to affected area daily as needed.  levothyroxine (SYNTHROID) 150 mcg tablet Take 150 mcg by mouth six (6) days a week. Monday - Saturday = 150 mcg  Sunday = 75 mcg (1/2 of a 150 mcg tablet)    levothyroxine (SYNTHROID) 150 mcg tablet Take 75 mcg by mouth every Sunday. Monday - Saturday = 150 mcg  Sunday = 75 mcg (1/2 of a 150 mcg tablet)    triamcinolone acetonide (KENALOG) 0.1 % topical cream Apply  to affected area two (2) times daily as needed for Skin Irritation. use thin layer    hydrocortisone (SCALPICIN ANTI-ITCH) 1 % soln by Apply Externally route daily as needed.  torsemide (DEMADEX) 10 mg tablet Take 1 Tab by mouth daily.  acetaminophen (TYLENOL ARTHRITIS PAIN) 650 mg TbER Take 650 mg by mouth two (2) times a day.  cholecalciferol (VITAMIN D3) 1,000 unit cap Take 1,000 Units by mouth daily.  albuterol (PROVENTIL HFA) 90 mcg/actuation inhaler Take 2 Puffs by inhalation every four (4) hours as needed for Wheezing.  aspirin delayed-release (ASPIR-81) 81 mg tablet Take 81 mg by mouth daily.          Review of Systems:  (bold if positive, if negative)    Gen:  fever, chills, fatigueEyes:  ENT:  CVS:  Pulm:  GI:  Abdominal pain, nausea,  :    MS:  Skin:  Psych:  Endo:    Hem:  Renal:    Neuro:  weakness          Objective:      VITALS:    Vital signs reviewed; most recent are:    Visit Vitals    /68    Pulse 87    Temp 99.4 °F (37.4 °C)    Resp 18    Ht 5' 10\" (1.778 m)    Wt 97.1 kg (214 lb)    SpO2 93%    BMI 30.71 kg/m2     SpO2 Readings from Last 6 Encounters:   09/07/18 93%   05/24/18 98%   11/16/17 95%   05/04/17 92%   03/13/17 96%   11/17/16 94%        No intake or output data in the 24 hours ending 09/07/18 1916         Exam:     Physical Exam:    Gen:  Well-developed, obese, in no acute distress  HEENT:  Pink conjunctivae, PERRL, hearing intact to voice, moist mucous membranes  Neck:  Supple, without masses, thyroid non-tender  Resp:  No accessory muscle use, clear breath sounds without wheezes rales or rhonchi  Card:  No murmurs, normal S1, S2 without thrills, bruits or peripheral edema  Abd:  Soft, tender in RUQ and epigastrium, non-distended, normoactive bowel sounds are present, no palpable organomegaly and no detectable hernias  Lymph:  No cervical or inguinal adenopathy  Musc:  No cyanosis or clubbing  Skin:  No rashes or ulcers, skin turgor is good  Neuro:  Cranial nerves are grossly intact, no focal motor weakness, follows commands appropriately  Psych:  Good insight, oriented to person, place and time, alert       Preoperative Labs:    Imaging: KUB with non-obstructive bowel gas pattern, visualized by me. Abdominal CT suggestive of acute cholecystitis    Recent Labs      09/07/18   1511   WBC  19.8*   HGB  13.2   HCT  40.7   PLT  227     Recent Labs      09/07/18   1721  09/07/18   1511   NA   --   137   K   --   4.5   CL   --   99   CO2   --   27   GLU   --   102*   BUN   --   27*   CREA   --   1.67*   CA   --   9.0   MG  2.3   --    ALB   --   2.9*   TBILI   --   1.0   SGOT   --   24   ALT   --   25     Lab Results   Component Value Date/Time    Glucose (POC) 159 (H) 05/11/2012 09:58 PM    Glucose (POC) 116 (H) 04/29/2012 06:23 PM     No results for input(s): PH, PCO2, PO2, HCO3, FIO2 in the last 72 hours. No results for input(s): INR in the last 72 hours.     No lab exists for component: Geryl Bowels    Lab Results   Component Value Date/Time    Specimen Description: BLOOD 05/14/2012 01:10 PM    Specimen Description: BLOOD 05/11/2012 10:00 PM    Specimen Description: URINE 05/11/2012 10:00 PM    Specimen Description: URINE 04/29/2012 07:40 PM     Lab Results   Component Value Date/Time    Culture result: NO GROWTH 5 DAYS 05/14/2012 01:10 PM    Culture result:  05/11/2012 10:00 PM     BACTEROIDES FRAGILIS BETA LACTAMASE POSITIVE , ISOLATED FROM 2 OF 4 BOTTLES DRAWN (EACH FROM A DIFFERENT SITE. .. LAC AND R. LINE) SENDING TO REFERENCE LAB FOR SENSITIVITIES PLEASE REFER TO [id number] FOR FINAL RESULTS  PRELIMINARY REPORT OF APPARENT PLEOMORPHIC GRAM NEGATIVE RODS GROWING IN 1 OF 4 BOTTLES CALLED TO AND READ BACK BY Jessy Javed RN AT 1110 5/13/12 BY TT  APPARENT GRAM NEGATIVE RODS GROWING IN THE SECOND OF FOUR BOTTLES DRAWN (SITE = R LINE)  REMAINING BOTTLE(S) HAS/HAVE NO GROWTH IN 5 DAYS    Culture result: NO GROWTH 1 DAY 05/11/2012 10:00 PM    Culture result: ESCHERICHIA COLI 04/29/2012 07:40 PM       ___________________________________________________    Attending Physician: Christopher Castañeda MD

## 2018-09-07 NOTE — ED TRIAGE NOTES
Pt c/o gradual onset of weakness and n/v that started on Wednesday. Denies diarrhea, +intermittent fevers. +chills. +abdominal pain, +TTP.

## 2018-09-07 NOTE — ED PROVIDER NOTES
HPI Comments: This is a 86YOF with complaints of fatigue, fevers, n/v, mild abdominal pain. 3:05 PM 
I have evaluated the patient as the Provider in Triage. I have reviewed Her vital signs and the triage nurse assessment. I have talked with the patient and any available family and advised that I am the provider in triage and have ordered the appropriate study to initiate their work up based on the clinical presentation during my assessment. I have advised that the patient will be accommodated in the Main ED as soon as possible. I have also requested to contact the triage nurse or myself immediately if the patient experiences any changes in their condition during this brief waiting period. Allen Howell MD 
 
80 y.o. female with past medical history significant for migraines, anemia, hemorrhoids, OA of knee, psoriasis, glaucoma, COPD, chronic diastolic heart failure, hypothyroidism, GERD, PSVT, sepsus, venous insufficiency, and osteoporosis who presents from home with chief complaint of weakness. Patient states that she has felt generalized weakness and soreness within her abdomen for the past two days. She notes that she vomited 2 nights ago. Today, she claims that she had a fever of 101.1F 2 hours ago but notes no significant changes in her coughing. She admits to a h/o COPD and is currently taking thyroid medication, Toprol, and Fosamax. She denies nausea, diarrhea, hematuria, hematochezia, and dysuria. There are no other acute medical concerns at this time. Social hx: former tobacco user (quit 2011); negative alcohol use; negative drug use PCP: Chano Marin MD 
 
Note written by Sergei Leyva, as dictated by Larose Essex, PA 3:40 PM 
 
The history is provided by the patient. No  was used. Past Medical History:  
Diagnosis Date  Anemia   
 hx b12 def age 22-31s  COPD (chronic obstructive pulmonary disease) ([facility] [address] 7/2011 FEV1 1 L 7/2011. Dr. Jenae Mcnally  Diastolic dysfunction 4/52/7266  GERD (gastroesophageal reflux disease)  Glaucoma suspect of both eyes Dr Inga Robledo  Hemorrhoids  Migraine  Mixed stress and urge urinary incontinence  OA (osteoarthritis) of knee R, Dr. Oni Hubbard. Euflexa injections x3. has walker  Obesity (BMI 30.0-34.9) 9/7/2018  Osteoporosis  Other postablative hypothyroidism 1987 ROSAS 1987  saw Dr. Charlotte Slaughter  Paresthesia of foot, bilateral   
 Psoriasis   
 left ear  PSVT (paroxysmal supraventricular tachycardia) ([facility] [address] 5/19/2012 Dr. Guillermina Padron  Pulmonary nodules 7/2011 RLL x2.  stable 2/2012  Dr. 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  
Procedure Laterality Date  ECHO 2D ADULT  7/15/11  
 mild LVH, EF 65%, normal atrial sizes, indeterminate diastolic function, no sig valvular disease, RVSP 41  
 ECHO 2D ADULT  5/19/12  
 mild LVH, EF 55-60%  HX CATARACT REMOVAL Dr. Inga Robledo, bilaterally  HX COLONOSCOPY  6/28/12  
 2 polyps. Dr. Todd Mckeon  HX HYSTERECTOMY  1974  
 menorrhagia  HX OTHER SURGICAL Echo 7/31/14 - LVEF 60-65%, grade 1 DD  HX TONSILLECTOMY  NM CARDIAC SPECT W STRS/REST MULT  7/18/11  
 small sized fixed defect in inferolateral wall may represent scar or abd attenuation; EF 80%  VAS LOWER EXT ART PVR MULT LEVEL SEG PRESSURES  7/18/11  
 normal  
 
   
Family History:  
Problem Relation Age of Onset  Cancer Maternal Grandmother   
  breast  
 Cancer Sister   
  lymphopma  Heart Disease Father  Heart Disease Paternal Grandfather  Seizures Sister [employer] Arthritis-osteo Mother  Dementia Mother  Malignant Hyperthermia Neg Hx  Pseudocholinesterase Deficiency Neg Hx  Delayed Awakening Neg Hx  Post-op Nausea/Vomiting Neg Hx  Emergence Delirium Neg Hx  Post-op Cognitive Dysfunction Neg Hx   
 Other Neg Hx Social History Social History  Marital status:  Spouse name:   Number of children: 1  Years of education: N/A Occupational History  Retired Social History Main Topics  Smoking status: Former Smoker Packs/day: 1.00 Years: 60.00 Types: Cigarettes Quit date: 2011  Smokeless tobacco: Never Used  Alcohol use No  
 Drug use: No  
 Sexual activity: No  
 
Other Topics Concern  Not on file Social History Narrative ALLERGIES: Ciprofloxacin (bulk) and Diltiazem Review of Systems Constitutional: Negative for appetite change, chills, fatigue and fever. HENT: Negative for congestion, ear pain, postnasal drip, rhinorrhea and sore throat. Eyes: Negative for visual disturbance. Respiratory: Negative for cough, shortness of breath and wheezing. Cardiovascular: Negative for chest pain, palpitations and leg swelling. Gastrointestinal: Positive for abdominal pain (sore) and vomiting. Negative for anal bleeding, constipation, diarrhea and nausea. Genitourinary: Negative for dysuria and hematuria. Musculoskeletal: Negative for arthralgias and myalgias. Skin: Negative for rash. Allergic/Immunologic: Negative for immunocompromised state. Neurological: Positive for weakness. Negative for light-headedness and headaches. All other systems reviewed and are negative. Patient Vitals for the past 12 hrs: 
 Temp Pulse Resp BP SpO2  
18 1830 - 87 - 116/68 93 % 18 1800 - - - 109/55 93 % 18 1745 - - - 116/47 91 % 18 1453 99.4 °F (37.4 °C) 95 18 104/58 94 % Physical Exam  
Constitutional: She is oriented to person, place, and time. She appears well-developed and well-nourished. No distress. HENT:  
Head: Normocephalic and atraumatic.   
Right Ear: External ear normal.  
Left Ear: External ear normal.  
Nose: Nose normal.  
 Mouth/Throat: Oropharynx is clear and moist.  
Eyes: EOM are normal. Pupils are equal, round, and reactive to light. Neck: Neck supple. Cardiovascular: Normal rate, regular rhythm, normal heart sounds and intact distal pulses. Exam reveals no gallop and no friction rub. No murmur heard. Pulmonary/Chest: Effort normal and breath sounds normal. No stridor. No respiratory distress. She has no wheezes. She has no rales. She exhibits no tenderness. Abdominal: Soft. Bowel sounds are normal. She exhibits no distension and no mass. There is tenderness. There is no rebound and no guarding. Epigastric ttp without rebounding or guarding. No cvat Musculoskeletal: Normal range of motion. She exhibits no edema, tenderness or deformity. Neurological: She is alert and oriented to person, place, and time. No cranial nerve deficit. Coordination normal.  
Skin: No rash noted. No erythema. No pallor. Psychiatric: She has a normal mood and affect. Her behavior is normal.  
Nursing note and vitals reviewed. MDM Number of Diagnoses or Management Options Acute cholecystitis:  
  
Amount and/or Complexity of Data Reviewed Clinical lab tests: reviewed and ordered Tests in the radiology section of CPT®: ordered and reviewed Tests in the medicine section of CPT®: ordered and reviewed Review and summarize past medical records: yes Independent visualization of images, tracings, or specimens: yes Patient Progress Patient progress: stable ED Course Procedures 3:23 PM 
Discussed pt, sx, hx and current findings with Jeff Castillo MD. He is in agreement with plan and will see pt. Will get sepsis labs Siri Toure. BURTON Arambula 
 
 
ED EKG interpretation: 5:13 PM 
Rhythm: normal sinus rhythm, RBBB; and regular . Rate (approx.): 82; Axis: normal; P wave: normal; QRS interval: normal ; ST/T wave: normal; Other findings: abnormal ekg. This EKG was interpreted by Jeff Castillo MD,ED Provider. 5:57 PM 
Tennis Lacharles. BURTON Arambula spoke with Dr. Balbina Shah, Consult for Surgery. Discussed available diagnostic tests and clinical findings. He is in agreement with care plans as outlined. He will admit pt 
GRISEL Nava Critical Care: The reason for providing this level of medical care for this critically ill patient was due to a critical illness that impaired one or more vital organ systems such that there was a high probability of imminent or life threatening deterioration in the patients condition. This care involved high complexity decision making to assess, manipulate, and support vital system functions. Total critical care time spent exclusive of procedures:  35 min LABS COMPLETED AND REVIEWED: 
Recent Results (from the past 12 hour(s)) CBC WITH AUTOMATED DIFF Collection Time: 09/07/18  3:11 PM  
Result Value Ref Range WBC 19.8 (H) 3.6 - 11.0 K/uL  
 RBC 4.25 3.80 - 5.20 M/uL  
 HGB 13.2 11.5 - 16.0 g/dL HCT 40.7 35.0 - 47.0 % MCV 95.8 80.0 - 99.0 FL  
 MCH 31.1 26.0 - 34.0 PG  
 MCHC 32.4 30.0 - 36.5 g/dL  
 RDW 13.3 11.5 - 14.5 % PLATELET 186 156 - 282 K/uL MPV 10.7 8.9 - 12.9 FL  
 NRBC 0.0 0  WBC ABSOLUTE NRBC 0.00 0.00 - 0.01 K/uL NEUTROPHILS 85 (H) 32 - 75 % BAND NEUTROPHILS 4 0 - 6 % LYMPHOCYTES 4 (L) 12 - 49 % MONOCYTES 6 5 - 13 % EOSINOPHILS 0 0 - 7 % BASOPHILS 1 0 - 1 % IMMATURE GRANULOCYTES 0 %  
 ABS. NEUTROPHILS 17.6 (H) 1.8 - 8.0 K/UL  
 ABS. LYMPHOCYTES 0.8 0.8 - 3.5 K/UL  
 ABS. MONOCYTES 1.2 (H) 0.0 - 1.0 K/UL  
 ABS. EOSINOPHILS 0.0 0.0 - 0.4 K/UL  
 ABS. BASOPHILS 0.2 (H) 0.0 - 0.1 K/UL  
 ABS. IMM. GRANS. 0.0 K/UL  
 DF MANUAL    
 RBC COMMENTS NORMOCYTIC, NORMOCHROMIC METABOLIC PANEL, COMPREHENSIVE Collection Time: 09/07/18  3:11 PM  
Result Value Ref Range Sodium 137 136 - 145 mmol/L Potassium 4.5 3.5 - 5.1 mmol/L Chloride 99 97 - 108 mmol/L  
 CO2 27 21 - 32 mmol/L  Anion gap 11 5 - 15 mmol/L  
 Glucose 102 (H) 65 - 100 mg/dL BUN 27 (H) 6 - 20 MG/DL Creatinine 1.67 (H) 0.55 - 1.02 MG/DL  
 BUN/Creatinine ratio 16 12 - 20 GFR est AA 35 (L) >60 ml/min/1.73m2 GFR est non-AA 29 (L) >60 ml/min/1.73m2 Calcium 9.0 8.5 - 10.1 MG/DL Bilirubin, total 1.0 0.2 - 1.0 MG/DL  
 ALT (SGPT) 25 12 - 78 U/L  
 AST (SGOT) 24 15 - 37 U/L Alk. phosphatase 148 (H) 45 - 117 U/L Protein, total 7.1 6.4 - 8.2 g/dL Albumin 2.9 (L) 3.5 - 5.0 g/dL Globulin 4.2 (H) 2.0 - 4.0 g/dL A-G Ratio 0.7 (L) 1.1 - 2.2 LIPASE Collection Time: 09/07/18  3:11 PM  
Result Value Ref Range Lipase 100 73 - 393 U/L  
EKG, 12 LEAD, INITIAL Collection Time: 09/07/18  5:13 PM  
Result Value Ref Range Ventricular Rate 82 BPM  
 Atrial Rate 82 BPM  
 P-R Interval 166 ms  
 QRS Duration 136 ms  
 Q-T Interval 396 ms QTC Calculation (Bezet) 462 ms Calculated P Axis 69 degrees Calculated R Axis 62 degrees Calculated T Axis 38 degrees Diagnosis Normal sinus rhythm Right bundle branch block Abnormal ECG When compared with ECG of 19-MAY-2012 06:56, 
premature atrial complexes are no longer present Right bundle branch block is now present URINALYSIS W/MICROSCOPIC Collection Time: 09/07/18  5:21 PM  
Result Value Ref Range Color DARK YELLOW Appearance TURBID (A) CLEAR Specific gravity 1.018 1.003 - 1.030    
 pH (UA) 5.0 5.0 - 8.0 Protein TRACE (A) NEG mg/dL Glucose NEGATIVE  NEG mg/dL Ketone TRACE (A) NEG mg/dL Blood NEGATIVE  NEG Urobilinogen 1.0 0.2 - 1.0 EU/dL Nitrites NEGATIVE  NEG Leukocyte Esterase LARGE (A) NEG    
 WBC 20-50 0 - 4 /hpf  
 RBC 0-5 0 - 5 /hpf Epithelial cells FEW FEW /lpf Bacteria 2+ (A) NEG /hpf URINE CULTURE HOLD SAMPLE Collection Time: 09/07/18  5:21 PM  
Result Value Ref Range Urine culture hold URINE ON HOLD IN MICROBIOLOGY DEPT FOR 3 DAYS.  IF UNPRESERVED URINE IS SUBMITTED, IT CANNOT BE USED FOR ADDITIONAL TESTING AFTER 24 HRS, RECOLLECTION WILL BE REQUIRED. TROPONIN I Collection Time: 09/07/18  5:21 PM  
Result Value Ref Range Troponin-I, Qt. 0.08 (H) <0.05 ng/mL MAGNESIUM Collection Time: 09/07/18  5:21 PM  
Result Value Ref Range Magnesium 2.3 1.6 - 2.4 mg/dL LACTIC ACID Collection Time: 09/07/18  5:21 PM  
Result Value Ref Range Lactic acid 1.6 0.4 - 2.0 MMOL/L  
SAMPLES BEING HELD Collection Time: 09/07/18  5:21 PM  
Result Value Ref Range SAMPLES BEING HELD LAV,RED,BLUE,GOLD COMMENT Add-on orders for these samples will be processed based on acceptable specimen integrity and analyte stability, which may vary by analyte. BILIRUBIN, CONFIRM Collection Time: 09/07/18  5:21 PM  
Result Value Ref Range Bilirubin UA, confirm NEGATIVE  NEG    
 
 
IMAGING COMPLETED AND REVIEWED: 
The following have been ordered and reviewed: 
 
Xr Abd (kub) Result Date: 9/7/2018 INDICATION:  Abdominal Pain Exam: Single view of the abdomen. No comparisons. Oris Josue FINDINGS: Bowel gas pattern is normal. No free air is demonstrated. No abnormal calcifications. No focal opacities at the lung bases. . Bones are diffusely osteopenic IMPRESSION: 1. Nonobstructive bowel gas pattern Ct Abd Pelv Wo Cont Result Date: 9/7/2018 EXAM:  CT ABD PELV WO CONT INDICATION: abd pain, f/c nausea  weakness and abdominal pain COMPARISON: 2012 CONTRAST:  None. TECHNIQUE: Thin axial images were obtained through the abdomen and pelvis. Coronal and sagittal reconstructions were generated. Oral contrast was not administered. CT dose reduction was achieved through use of a standardized protocol tailored for this examination and automatic exposure control for dose modulation. The absence of intravenous contrast material reduces the sensitivity for evaluation of the solid parenchymal organs of the abdomen. FINDINGS: LUNG BASES: Clear. INCIDENTALLY IMAGED HEART AND MEDIASTINUM: Unremarkable. LIVER: No mass or biliary dilatation. GALLBLADDER: Gallbladder is distended and there is adjacent inflammation. SPLEEN: No mass. PANCREAS: No mass or ductal dilatation. ADRENALS: Bilateral adrenal adenomas KIDNEYS/URETERS: No mass, calculus, or hydronephrosis. STOMACH: Unremarkable. SMALL BOWEL: No dilatation or wall thickening. COLON: No dilatation or wall thickening. APPENDIX: Unremarkable. PERITONEUM: No ascites or pneumoperitoneum. RETROPERITONEUM: No lymphadenopathy or aortic aneurysm. REPRODUCTIVE ORGANS: Normal. URINARY BLADDER: No mass or calculus. BONES: No destructive bone lesion. ADDITIONAL COMMENTS: N/A IMPRESSION: Findings are suspicious for acute cholecystitis. MEDICATIONS GIVEN: 
Medications  
sodium chloride 0.9 % bolus infusion 1,000 mL (not administered) iopamidol (ISOVUE-370) 76 % injection 100 mL (not administered)  
sodium chloride (NS) flush 5-10 mL (not administered)  
sodium chloride (NS) flush 5-10 mL (not administered)  
naloxone (NARCAN) injection 0.4 mg (not administered)  
acetaminophen (TYLENOL) tablet 650 mg (not administered) morphine injection 2 mg (not administered)  
heparin (porcine) injection 5,000 Units (5,000 Units SubCUTAneous Given 9/7/18 1849) ondansetron (ZOFRAN) injection 4 mg (not administered) dextrose 5% - 0.45% NaCl with KCl 20 mEq/L infusion (not administered)  
metoprolol succinate (TOPROL-XL) XL tablet 50 mg (not administered)  
piperacillin-tazobactam (ZOSYN) 3.375 g in 0.9% sodium chloride (MBP/ADV) 100 mL (not administered)  
piperacillin-tazobactam (ZOSYN) 3.375 g in 0.9% sodium chloride (MBP/ADV) 100 mL (not administered)  
sodium chloride 0.9 % bolus infusion 1,000 mL (1,000 mL IntraVENous New Bag 9/7/18 1740) cefOXitin (MEFOXIN) 2 g in 0.9% sodium chloride (MBP/ADV) 50 mL (2 g IntraVENous New Bag 9/7/18 1840) CLINICAL IMPRESSION: 
 1. Acute cholecystitis 2. Acute kidney injury ([facility] [address] 3. Urinary tract infection without hematuria, site unspecified 4. Elevated troponin Plan 1. Admission per Dr Mj Weiss 
 
 
5:58 PM 
The patient is being admitted to the hospital.  The results of their tests and reasons for their admission have been discussed with them and/or available family. The patient/family has conveyed agreement and understanding for the need to be admitted and for their admission diagnosis. Consultation has been made with the inpatient physician specialist for hospitalization.

## 2018-09-08 ENCOUNTER — ANESTHESIA (OUTPATIENT)
Dept: SURGERY | Age: 83
DRG: 418 | End: 2018-09-08
Payer: MEDICARE

## 2018-09-08 LAB
ALBUMIN SERPL-MCNC: 2.5 G/DL (ref 3.5–5)
ALBUMIN/GLOB SERPL: 0.7 {RATIO} (ref 1.1–2.2)
ALP SERPL-CCNC: 149 U/L (ref 45–117)
ALT SERPL-CCNC: 17 U/L (ref 12–78)
ANION GAP SERPL CALC-SCNC: 8 MMOL/L (ref 5–15)
AST SERPL-CCNC: 16 U/L (ref 15–37)
BASOPHILS # BLD: 0.1 K/UL (ref 0–0.1)
BASOPHILS NFR BLD: 0 % (ref 0–1)
BILIRUB SERPL-MCNC: 0.8 MG/DL (ref 0.2–1)
BUN SERPL-MCNC: 26 MG/DL (ref 6–20)
BUN/CREAT SERPL: 16 (ref 12–20)
CALCIUM SERPL-MCNC: 8.3 MG/DL (ref 8.5–10.1)
CHLORIDE SERPL-SCNC: 106 MMOL/L (ref 97–108)
CO2 SERPL-SCNC: 26 MMOL/L (ref 21–32)
CREAT SERPL-MCNC: 1.6 MG/DL (ref 0.55–1.02)
DIFFERENTIAL METHOD BLD: ABNORMAL
EOSINOPHIL # BLD: 0.1 K/UL (ref 0–0.4)
EOSINOPHIL NFR BLD: 0 % (ref 0–7)
ERYTHROCYTE [DISTWIDTH] IN BLOOD BY AUTOMATED COUNT: 13.2 % (ref 11.5–14.5)
GLOBULIN SER CALC-MCNC: 3.8 G/DL (ref 2–4)
GLUCOSE SERPL-MCNC: 91 MG/DL (ref 65–100)
HCT VFR BLD AUTO: 37.8 % (ref 35–47)
HGB BLD-MCNC: 11.8 G/DL (ref 11.5–16)
IMM GRANULOCYTES # BLD: 0.1 K/UL (ref 0–0.04)
IMM GRANULOCYTES NFR BLD AUTO: 1 % (ref 0–0.5)
LYMPHOCYTES # BLD: 0.6 K/UL (ref 0.8–3.5)
LYMPHOCYTES NFR BLD: 4 % (ref 12–49)
MCH RBC QN AUTO: 30.4 PG (ref 26–34)
MCHC RBC AUTO-ENTMCNC: 31.2 G/DL (ref 30–36.5)
MCV RBC AUTO: 97.4 FL (ref 80–99)
MONOCYTES # BLD: 1.1 K/UL (ref 0–1)
MONOCYTES NFR BLD: 7 % (ref 5–13)
NEUTS SEG # BLD: 12.8 K/UL (ref 1.8–8)
NEUTS SEG NFR BLD: 88 % (ref 32–75)
NRBC # BLD: 0 K/UL (ref 0–0.01)
NRBC BLD-RTO: 0 PER 100 WBC
PLATELET # BLD AUTO: 165 K/UL (ref 150–400)
PMV BLD AUTO: 10.1 FL (ref 8.9–12.9)
POTASSIUM SERPL-SCNC: 4.6 MMOL/L (ref 3.5–5.1)
PROT SERPL-MCNC: 6.3 G/DL (ref 6.4–8.2)
RBC # BLD AUTO: 3.88 M/UL (ref 3.8–5.2)
SODIUM SERPL-SCNC: 140 MMOL/L (ref 136–145)
WBC # BLD AUTO: 14.6 K/UL (ref 3.6–11)

## 2018-09-08 PROCEDURE — 77030002933 HC SUT MCRYL J&J -A: Performed by: SURGERY

## 2018-09-08 PROCEDURE — 77030009852 HC PCH RTVR ENDOSC COVD -B: Performed by: SURGERY

## 2018-09-08 PROCEDURE — 77030004813 HC CATH CHOLGM TELE -B: Performed by: SURGERY

## 2018-09-08 PROCEDURE — 76210000017 HC OR PH I REC 1.5 TO 2 HR: Performed by: SURGERY

## 2018-09-08 PROCEDURE — 74011000258 HC RX REV CODE- 258: Performed by: INTERNAL MEDICINE

## 2018-09-08 PROCEDURE — 74011000250 HC RX REV CODE- 250: Performed by: SURGERY

## 2018-09-08 PROCEDURE — C9290 INJ, BUPIVACAINE LIPOSOME: HCPCS | Performed by: SURGERY

## 2018-09-08 PROCEDURE — 87077 CULTURE AEROBIC IDENTIFY: CPT | Performed by: EMERGENCY MEDICINE

## 2018-09-08 PROCEDURE — 77030018836 HC SOL IRR NACL ICUM -A: Performed by: SURGERY

## 2018-09-08 PROCEDURE — 77030037032 HC INSRT SCIS CLICKLLINE DISP STOR -B: Performed by: SURGERY

## 2018-09-08 PROCEDURE — 77030008756 HC TU IRR SUC STRY -B: Performed by: SURGERY

## 2018-09-08 PROCEDURE — 77030010939 HC CLP LIG TELE -B: Performed by: SURGERY

## 2018-09-08 PROCEDURE — 85025 COMPLETE CBC W/AUTO DIFF WBC: CPT | Performed by: SURGERY

## 2018-09-08 PROCEDURE — 77030039266 HC ADH SKN EXOFIN S2SG -A: Performed by: SURGERY

## 2018-09-08 PROCEDURE — 74011250636 HC RX REV CODE- 250/636

## 2018-09-08 PROCEDURE — 77030026438 HC STYL ET INTUB CARD -A: Performed by: NURSE ANESTHETIST, CERTIFIED REGISTERED

## 2018-09-08 PROCEDURE — 77030035045 HC TRCR ENDOSC VRSPRT BLDLSS COVD -B: Performed by: SURGERY

## 2018-09-08 PROCEDURE — 88304 TISSUE EXAM BY PATHOLOGIST: CPT | Performed by: SURGERY

## 2018-09-08 PROCEDURE — 74011000250 HC RX REV CODE- 250: Performed by: INTERNAL MEDICINE

## 2018-09-08 PROCEDURE — 74011250636 HC RX REV CODE- 250/636: Performed by: INTERNAL MEDICINE

## 2018-09-08 PROCEDURE — 77030035051: Performed by: SURGERY

## 2018-09-08 PROCEDURE — 93005 ELECTROCARDIOGRAM TRACING: CPT

## 2018-09-08 PROCEDURE — 77030009403 HC ELECTRD ENDO MEGA -B: Performed by: SURGERY

## 2018-09-08 PROCEDURE — 87186 SC STD MICRODIL/AGAR DIL: CPT | Performed by: EMERGENCY MEDICINE

## 2018-09-08 PROCEDURE — 80053 COMPREHEN METABOLIC PANEL: CPT | Performed by: SURGERY

## 2018-09-08 PROCEDURE — 77030027743 HC APPL F/HEMSTAT BARD -B: Performed by: SURGERY

## 2018-09-08 PROCEDURE — 0FT44ZZ RESECTION OF GALLBLADDER, PERCUTANEOUS ENDOSCOPIC APPROACH: ICD-10-PCS | Performed by: SURGERY

## 2018-09-08 PROCEDURE — 77030032060 HC PWDR HEMSTAT ARISTA ASRB 3GM BARD -C: Performed by: SURGERY

## 2018-09-08 PROCEDURE — 77030031139 HC SUT VCRL2 J&J -A: Performed by: SURGERY

## 2018-09-08 PROCEDURE — 77030020747 HC TU INSUF ENDOSC TELE -A: Performed by: SURGERY

## 2018-09-08 PROCEDURE — 74011250636 HC RX REV CODE- 250/636: Performed by: SURGERY

## 2018-09-08 PROCEDURE — 76010000149 HC OR TIME 1 TO 1.5 HR: Performed by: SURGERY

## 2018-09-08 PROCEDURE — 36415 COLL VENOUS BLD VENIPUNCTURE: CPT | Performed by: SURGERY

## 2018-09-08 PROCEDURE — 87205 SMEAR GRAM STAIN: CPT | Performed by: EMERGENCY MEDICINE

## 2018-09-08 PROCEDURE — 87040 BLOOD CULTURE FOR BACTERIA: CPT | Performed by: INTERNAL MEDICINE

## 2018-09-08 PROCEDURE — 76060000033 HC ANESTHESIA 1 TO 1.5 HR: Performed by: SURGERY

## 2018-09-08 PROCEDURE — 74011250637 HC RX REV CODE- 250/637: Performed by: INTERNAL MEDICINE

## 2018-09-08 PROCEDURE — 65660000000 HC RM CCU STEPDOWN

## 2018-09-08 PROCEDURE — 87075 CULTR BACTERIA EXCEPT BLOOD: CPT | Performed by: EMERGENCY MEDICINE

## 2018-09-08 PROCEDURE — 77030032490 HC SLV COMPR SCD KNE COVD -B: Performed by: SURGERY

## 2018-09-08 PROCEDURE — 77030019908 HC STETH ESOPH SIMS -A: Performed by: NURSE ANESTHETIST, CERTIFIED REGISTERED

## 2018-09-08 PROCEDURE — 77030008684 HC TU ET CUF COVD -B: Performed by: NURSE ANESTHETIST, CERTIFIED REGISTERED

## 2018-09-08 PROCEDURE — 77030011640 HC PAD GRND REM COVD -A: Performed by: SURGERY

## 2018-09-08 PROCEDURE — 77030018390 HC SPNG HEMSTAT2 J&J -B: Performed by: SURGERY

## 2018-09-08 PROCEDURE — 77030020263 HC SOL INJ SOD CL0.9% LFCR 1000ML: Performed by: SURGERY

## 2018-09-08 PROCEDURE — 74011000250 HC RX REV CODE- 250

## 2018-09-08 PROCEDURE — 74011000258 HC RX REV CODE- 258: Performed by: SURGERY

## 2018-09-08 PROCEDURE — 77030010513 HC APPL CLP LIG J&J -C: Performed by: SURGERY

## 2018-09-08 PROCEDURE — 77030035029 HC NDL INSUF VERES DISP COVD -B: Performed by: SURGERY

## 2018-09-08 PROCEDURE — 77030035048 HC TRCR ENDOSC OPTCL COVD -B: Performed by: SURGERY

## 2018-09-08 RX ORDER — PROPOFOL 10 MG/ML
INJECTION, EMULSION INTRAVENOUS AS NEEDED
Status: DISCONTINUED | OUTPATIENT
Start: 2018-09-08 | End: 2018-09-08 | Stop reason: HOSPADM

## 2018-09-08 RX ORDER — SODIUM CHLORIDE, SODIUM LACTATE, POTASSIUM CHLORIDE, CALCIUM CHLORIDE 600; 310; 30; 20 MG/100ML; MG/100ML; MG/100ML; MG/100ML
INJECTION, SOLUTION INTRAVENOUS
Status: DISCONTINUED | OUTPATIENT
Start: 2018-09-08 | End: 2018-09-08 | Stop reason: HOSPADM

## 2018-09-08 RX ORDER — LEVOTHYROXINE SODIUM 75 UG/1
75 TABLET ORAL
Status: DISCONTINUED | OUTPATIENT
Start: 2018-09-09 | End: 2018-09-12 | Stop reason: HOSPADM

## 2018-09-08 RX ORDER — SODIUM CHLORIDE, SODIUM LACTATE, POTASSIUM CHLORIDE, CALCIUM CHLORIDE 600; 310; 30; 20 MG/100ML; MG/100ML; MG/100ML; MG/100ML
125 INJECTION, SOLUTION INTRAVENOUS CONTINUOUS
Status: DISCONTINUED | OUTPATIENT
Start: 2018-09-08 | End: 2018-09-08 | Stop reason: HOSPADM

## 2018-09-08 RX ORDER — SODIUM CHLORIDE 0.9 % (FLUSH) 0.9 %
5-10 SYRINGE (ML) INJECTION AS NEEDED
Status: DISCONTINUED | OUTPATIENT
Start: 2018-09-08 | End: 2018-09-08 | Stop reason: HOSPADM

## 2018-09-08 RX ORDER — METRONIDAZOLE 500 MG/100ML
500 INJECTION, SOLUTION INTRAVENOUS EVERY 12 HOURS
Status: DISCONTINUED | OUTPATIENT
Start: 2018-09-08 | End: 2018-09-11

## 2018-09-08 RX ORDER — SODIUM CHLORIDE 0.9 % (FLUSH) 0.9 %
5-10 SYRINGE (ML) INJECTION EVERY 8 HOURS
Status: DISCONTINUED | OUTPATIENT
Start: 2018-09-08 | End: 2018-09-08 | Stop reason: HOSPADM

## 2018-09-08 RX ORDER — DEXAMETHASONE SODIUM PHOSPHATE 4 MG/ML
INJECTION, SOLUTION INTRA-ARTICULAR; INTRALESIONAL; INTRAMUSCULAR; INTRAVENOUS; SOFT TISSUE AS NEEDED
Status: DISCONTINUED | OUTPATIENT
Start: 2018-09-08 | End: 2018-09-08 | Stop reason: HOSPADM

## 2018-09-08 RX ORDER — NALOXONE HYDROCHLORIDE 0.4 MG/ML
0.04 INJECTION, SOLUTION INTRAMUSCULAR; INTRAVENOUS; SUBCUTANEOUS
Status: DISCONTINUED | OUTPATIENT
Start: 2018-09-08 | End: 2018-09-08 | Stop reason: HOSPADM

## 2018-09-08 RX ORDER — ROCURONIUM BROMIDE 10 MG/ML
INJECTION, SOLUTION INTRAVENOUS AS NEEDED
Status: DISCONTINUED | OUTPATIENT
Start: 2018-09-08 | End: 2018-09-08 | Stop reason: HOSPADM

## 2018-09-08 RX ORDER — LIDOCAINE HYDROCHLORIDE 10 MG/ML
0.1 INJECTION, SOLUTION EPIDURAL; INFILTRATION; INTRACAUDAL; PERINEURAL AS NEEDED
Status: DISCONTINUED | OUTPATIENT
Start: 2018-09-08 | End: 2018-09-08 | Stop reason: HOSPADM

## 2018-09-08 RX ORDER — ONDANSETRON 2 MG/ML
INJECTION INTRAMUSCULAR; INTRAVENOUS AS NEEDED
Status: DISCONTINUED | OUTPATIENT
Start: 2018-09-08 | End: 2018-09-08 | Stop reason: HOSPADM

## 2018-09-08 RX ORDER — CEFOXITIN 2 G/1
INJECTION, POWDER, FOR SOLUTION INTRAVENOUS AS NEEDED
Status: DISCONTINUED | OUTPATIENT
Start: 2018-09-08 | End: 2018-09-08 | Stop reason: HOSPADM

## 2018-09-08 RX ORDER — MELATONIN
1000 DAILY
Status: DISCONTINUED | OUTPATIENT
Start: 2018-09-09 | End: 2018-09-12 | Stop reason: HOSPADM

## 2018-09-08 RX ORDER — NEOSTIGMINE METHYLSULFATE 1 MG/ML
INJECTION INTRAVENOUS AS NEEDED
Status: DISCONTINUED | OUTPATIENT
Start: 2018-09-08 | End: 2018-09-08 | Stop reason: HOSPADM

## 2018-09-08 RX ORDER — MIDAZOLAM HYDROCHLORIDE 1 MG/ML
INJECTION, SOLUTION INTRAMUSCULAR; INTRAVENOUS AS NEEDED
Status: DISCONTINUED | OUTPATIENT
Start: 2018-09-08 | End: 2018-09-08 | Stop reason: HOSPADM

## 2018-09-08 RX ORDER — METOPROLOL TARTRATE 5 MG/5ML
2.5 INJECTION INTRAVENOUS EVERY 6 HOURS
Status: DISCONTINUED | OUTPATIENT
Start: 2018-09-08 | End: 2018-09-09

## 2018-09-08 RX ORDER — LIDOCAINE HYDROCHLORIDE 20 MG/ML
INJECTION, SOLUTION EPIDURAL; INFILTRATION; INTRACAUDAL; PERINEURAL AS NEEDED
Status: DISCONTINUED | OUTPATIENT
Start: 2018-09-08 | End: 2018-09-08 | Stop reason: HOSPADM

## 2018-09-08 RX ORDER — HYDROMORPHONE HYDROCHLORIDE 1 MG/ML
.25-1 INJECTION, SOLUTION INTRAMUSCULAR; INTRAVENOUS; SUBCUTANEOUS
Status: DISCONTINUED | OUTPATIENT
Start: 2018-09-08 | End: 2018-09-08 | Stop reason: HOSPADM

## 2018-09-08 RX ORDER — SUCCINYLCHOLINE CHLORIDE 20 MG/ML
INJECTION INTRAMUSCULAR; INTRAVENOUS AS NEEDED
Status: DISCONTINUED | OUTPATIENT
Start: 2018-09-08 | End: 2018-09-08 | Stop reason: HOSPADM

## 2018-09-08 RX ORDER — FLUMAZENIL 0.1 MG/ML
0.2 INJECTION INTRAVENOUS
Status: DISCONTINUED | OUTPATIENT
Start: 2018-09-08 | End: 2018-09-08 | Stop reason: HOSPADM

## 2018-09-08 RX ORDER — LEVOTHYROXINE SODIUM 150 UG/1
150 TABLET ORAL
Status: DISCONTINUED | OUTPATIENT
Start: 2018-09-08 | End: 2018-09-12 | Stop reason: HOSPADM

## 2018-09-08 RX ORDER — FENTANYL CITRATE 50 UG/ML
INJECTION, SOLUTION INTRAMUSCULAR; INTRAVENOUS AS NEEDED
Status: DISCONTINUED | OUTPATIENT
Start: 2018-09-08 | End: 2018-09-08 | Stop reason: HOSPADM

## 2018-09-08 RX ORDER — DEXTROSE MONOHYDRATE AND SODIUM CHLORIDE 5; .45 G/100ML; G/100ML
75 INJECTION, SOLUTION INTRAVENOUS CONTINUOUS
Status: DISCONTINUED | OUTPATIENT
Start: 2018-09-08 | End: 2018-09-09

## 2018-09-08 RX ORDER — DIPHENHYDRAMINE HYDROCHLORIDE 50 MG/ML
12.5 INJECTION, SOLUTION INTRAMUSCULAR; INTRAVENOUS AS NEEDED
Status: DISCONTINUED | OUTPATIENT
Start: 2018-09-08 | End: 2018-09-08 | Stop reason: HOSPADM

## 2018-09-08 RX ORDER — GLYCOPYRROLATE 0.2 MG/ML
INJECTION INTRAMUSCULAR; INTRAVENOUS AS NEEDED
Status: DISCONTINUED | OUTPATIENT
Start: 2018-09-08 | End: 2018-09-08 | Stop reason: HOSPADM

## 2018-09-08 RX ADMIN — NEOSTIGMINE METHYLSULFATE 3 MG: 1 INJECTION INTRAVENOUS at 12:26

## 2018-09-08 RX ADMIN — HEPARIN SODIUM 5000 UNITS: 5000 INJECTION INTRAVENOUS; SUBCUTANEOUS at 13:19

## 2018-09-08 RX ADMIN — MORPHINE SULFATE 2 MG: 4 INJECTION, SOLUTION INTRAMUSCULAR; INTRAVENOUS at 09:22

## 2018-09-08 RX ADMIN — METOPROLOL TARTRATE 2.5 MG: 1 INJECTION, SOLUTION INTRAVENOUS at 18:31

## 2018-09-08 RX ADMIN — ROCURONIUM BROMIDE 20 MG: 10 INJECTION, SOLUTION INTRAVENOUS at 11:55

## 2018-09-08 RX ADMIN — METRONIDAZOLE 500 MG: 500 INJECTION, SOLUTION INTRAVENOUS at 22:38

## 2018-09-08 RX ADMIN — CEFOXITIN 2 G: 2 INJECTION, POWDER, FOR SOLUTION INTRAVENOUS at 11:40

## 2018-09-08 RX ADMIN — MORPHINE SULFATE 2 MG: 4 INJECTION, SOLUTION INTRAMUSCULAR; INTRAVENOUS at 22:45

## 2018-09-08 RX ADMIN — SODIUM CHLORIDE, SODIUM LACTATE, POTASSIUM CHLORIDE, CALCIUM CHLORIDE: 600; 310; 30; 20 INJECTION, SOLUTION INTRAVENOUS at 09:54

## 2018-09-08 RX ADMIN — LIDOCAINE HYDROCHLORIDE 60 MG: 20 INJECTION, SOLUTION EPIDURAL; INFILTRATION; INTRACAUDAL; PERINEURAL at 11:35

## 2018-09-08 RX ADMIN — SUCCINYLCHOLINE CHLORIDE 100 MG: 20 INJECTION INTRAMUSCULAR; INTRAVENOUS at 11:35

## 2018-09-08 RX ADMIN — MIDAZOLAM HYDROCHLORIDE 2 MG: 1 INJECTION, SOLUTION INTRAMUSCULAR; INTRAVENOUS at 11:28

## 2018-09-08 RX ADMIN — CEFEPIME 2 G: 2 INJECTION, POWDER, FOR SOLUTION INTRAVENOUS at 16:01

## 2018-09-08 RX ADMIN — HEPARIN SODIUM 5000 UNITS: 5000 INJECTION INTRAVENOUS; SUBCUTANEOUS at 22:38

## 2018-09-08 RX ADMIN — PROPOFOL 50 MG: 10 INJECTION, EMULSION INTRAVENOUS at 11:55

## 2018-09-08 RX ADMIN — PIPERACILLIN SODIUM, TAZOBACTAM SODIUM 3.38 G: 3; .375 INJECTION, POWDER, LYOPHILIZED, FOR SOLUTION INTRAVENOUS at 00:35

## 2018-09-08 RX ADMIN — DEXTROSE MONOHYDRATE AND SODIUM CHLORIDE 75 ML/HR: 5; .45 INJECTION, SOLUTION INTRAVENOUS at 09:27

## 2018-09-08 RX ADMIN — METRONIDAZOLE 500 MG: 500 INJECTION, SOLUTION INTRAVENOUS at 13:20

## 2018-09-08 RX ADMIN — Medication 10 ML: at 22:38

## 2018-09-08 RX ADMIN — FENTANYL CITRATE 50 MCG: 50 INJECTION, SOLUTION INTRAMUSCULAR; INTRAVENOUS at 11:52

## 2018-09-08 RX ADMIN — PROPOFOL 150 MG: 10 INJECTION, EMULSION INTRAVENOUS at 11:35

## 2018-09-08 RX ADMIN — LEVOTHYROXINE SODIUM 150 MCG: 150 TABLET ORAL at 18:31

## 2018-09-08 RX ADMIN — ROCURONIUM BROMIDE 10 MG: 10 INJECTION, SOLUTION INTRAVENOUS at 11:35

## 2018-09-08 RX ADMIN — GLYCOPYRROLATE 0.4 MG: 0.2 INJECTION INTRAMUSCULAR; INTRAVENOUS at 12:26

## 2018-09-08 RX ADMIN — METOPROLOL TARTRATE 2.5 MG: 1 INJECTION, SOLUTION INTRAVENOUS at 13:17

## 2018-09-08 RX ADMIN — DEXAMETHASONE SODIUM PHOSPHATE 4 MG: 4 INJECTION, SOLUTION INTRA-ARTICULAR; INTRALESIONAL; INTRAMUSCULAR; INTRAVENOUS; SOFT TISSUE at 11:48

## 2018-09-08 RX ADMIN — MORPHINE SULFATE 2 MG: 4 INJECTION, SOLUTION INTRAMUSCULAR; INTRAVENOUS at 16:02

## 2018-09-08 RX ADMIN — ONDANSETRON 4 MG: 2 INJECTION INTRAMUSCULAR; INTRAVENOUS at 11:28

## 2018-09-08 RX ADMIN — METOPROLOL TARTRATE 2.5 MG: 1 INJECTION, SOLUTION INTRAVENOUS at 22:38

## 2018-09-08 NOTE — PERIOP NOTES
PACU IN REPORT FROM ANESTHESIA Verbal report received from   [address] [place] [address] [facility]  [facility]      following General for Procedure(s) (LRB): 
CHOLECYSTECTOMY LAPAROSCOPIC (N/A) by  Lui Jiménez MD. 
 
Note the anesthesia record for medications given intraoperatively. Brief Initial Visual Assessment: 
 
Patient Age: [] Infant(1-12mo)      []Pediatric(1-13yrs)    [] Adolescent(13-18yrs) [] Adult(18-65yrs)      [x]Geriatric Adult(>65yrs). Patient    [] Alert           []Calm & Cooperative      [] Anxious Appearance: [] Drowsy      [x] Sedated                          [x] Unresponsive Oriented x 0 Airway:     [x] Patent   
                      [] Near-obstructed   [] Obstructed 
                      []Improved with head/airway repositioning Airway Adjuncts Present: [] Oral Airway    [] Nasal Trumpet      
  [] ETT Respiratory  [x] Even              [] Labored      [] Shallow Pattern:    [x] Non-Labored  [] VENT and/or respiratory assistance 
   being provided. Skin:     [x] Pink [] Pale [x] Warm [] Cool [] Cold [x]Dry [] Moist [] Diaphoretic Membranes:  [x] Pink [] Pale    
  [x] Moist [] Dry [] Crusty Pain:   [x] No Acute Discomfort. 0 /10 Scale [] Verbal Numeric 
 [] Moderate Discomfort.      [] V.A.S. [] Acute Discomfort. [x] A.N.V. 
  [] Chronic-Issue Related Discomfort.   [] F.L.A.C.C. Note E-MAR for medications administered. []Faces, Aragon/Mott Pt arrival from OR in uncontorlled rate A-Fib 120's - new according to CRNA and MDA - Note orders. Cardiology consult called by MDA. Note assessments documented in flowsheets; any assessment variants to be found in comments or narrative perioperative nurse notes.     
 
Post-anesthesia care now assumed by [facility] BSN, RN-BC

## 2018-09-08 NOTE — CONSULTS
Keesha Joseph MD [facility] [address]  Office [phone]  Mobile [phone]    Date of  Admission: 9/7/2018  3:05 PM  PCP- Merly Diaz MD    Remigio Webb is a 80 y.o. female admitted for ACUTE CHOLECYSTITIS; Cholecystitis. 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 She has HFrEF since 2012, PSVT, COPD, all relatively stable without recent exacerbations. Admitted with acute cholecystitis with plans for lap ccy this morning. Admission labs significant for CARMEL, likely due to volume depletion - diuretics held, getting iv fluids. Troponin 0.08 which is nonsignificant. EKG with sinus with chronic RBBB. Lives alone, independent in her ADLs. Stable pattern of GARCIA with vacuuming, etc. Patient denies any exertional chest pain, palpitations, syncope, orthopnea, or paroxysmal nocturnal dyspnea. No recent cough or wheezing. Echo 2014 with EF 60%    Past Medical History:   Diagnosis Date    (HFpEF) heart failure with preserved ejection fraction ([address] 05/22/2012    first presented 2012 in the setting of sepsis    Anemia     hx b12 def age 22-31s    COPD (chronic obstructive pulmonary disease) ([address] 7/2011    FEV1 1 L 7/2011.  Dr. Mirna Castro GERD (gastroesophageal reflux disease)     Glaucoma suspect of both eyes     Dr Andreina Cortes    Hemorrhoids     Migraine     Mixed stress and urge urinary incontinence     OA (osteoarthritis) of knee     R, Dr. Remy Metcalf. Euflexa injections x3. has walker    Obesity (BMI 30.0-34.9) 9/7/2018    Osteoporosis     Other postablative hypothyroidism 1987    ROSAS 1987  saw Dr. Anabell Gray Paresthesia of foot, bilateral     Psoriasis     left ear    PSVT (paroxysmal supraventricular tachycardia) ([address] 5/19/2012    Dr. Christiano Angulo Pulmonary nodules 7/2011    RLL x2.  stable 2/2012  Dr. 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 Dr. Delonte Flores    menorrhagia    HX OTHER SURGICAL      Echo 7/31/14 - LVEF 60-65%, grade 1 DD    HX TONSILLECTOMY      NM CARDIAC SPECT W STRS/REST MULT  7/18/11    small sized fixed defect in inferolateral wall may represent scar or abd attenuation; EF 80%    VAS LOWER EXT ART PVR MULT LEVEL SEG PRESSURES  7/18/11    normal     No current facility-administered medications on file prior to encounter. Current Outpatient Prescriptions on File Prior to Encounter   Medication Sig Dispense Refill    torsemide (DEMADEX) 10 mg tablet Take 1 Tab by mouth daily. 90 Tab 3    acetaminophen (TYLENOL ARTHRITIS PAIN) 650 mg TbER Take 650 mg by mouth two (2) times a day.  cholecalciferol (VITAMIN D3) 1,000 unit cap Take 1,000 Units by mouth daily.  albuterol (PROVENTIL HFA) 90 mcg/actuation inhaler Take 2 Puffs by inhalation every four (4) hours as needed for Wheezing.  1 Inhaler 5    aspirin delayed-release (ASPIR-81) 81 mg tablet Take 81 mg by mouth daily. Allergies   Allergen Reactions    Ciprofloxacin (Bulk) Rash    Diltiazem Rash             Review of Symptoms:  Constitutional: negative for anorexia and weight loss  Respiratory: negative for cough, sputum, hemoptysis or pleurisy/chest pain  Gastrointestinal: negative for dysphagia, odynophagia and dyspepsia  Musculoskeletal:negative  Neurological: negative for dizziness, vertigo and seizures  Other systems reviewed and negative except as above. Physical Exam    Visit Vitals    /67 (BP 1 Location: Left arm, BP Patient Position: At rest;Supine)    Pulse 88    Temp 99.2 °F (37.3 °C)    Resp 17    Ht 5' 10\" (1.778 m)    Wt 214 lb (97.1 kg)    SpO2 90%    BMI 30.71 kg/m2     Visit Vitals    /60 (BP 1 Location: Right arm, BP Patient Position: At rest)    Pulse 94    Temp 99.3 °F (37.4 °C)    Resp 16    Ht 5' 10\" (1.778 m)    Wt 214 lb (97.1 kg)    SpO2 91%    BMI 30.71 kg/m2     General Appearance:  Well developed, well nourished,alert and oriented x 3, and individual in no acute distress. Ears/Nose/Mouth/Throat:   Hearing grossly normal.         Neck: Supple. JVP nl. Chest:   Lungs clear to auscultation bilaterally. Cardiovascular:  Regular rate and rhythm, S1, S2 normal, no murmur. Abdomen:   Soft, mild diffuse tenderness   Extremities: No edema bilaterally. Skin: Warm and dry.                Cardiographics    Recent Results (from the past 12 hour(s))   CBC WITH AUTOMATED DIFF    Collection Time: 09/08/18  3:15 AM   Result Value Ref Range    WBC 14.6 (H) 3.6 - 11.0 K/uL    RBC 3.88 3.80 - 5.20 M/uL    HGB 11.8 11.5 - 16.0 g/dL    HCT 37.8 35.0 - 47.0 %    MCV 97.4 80.0 - 99.0 FL    MCH 30.4 26.0 - 34.0 PG    MCHC 31.2 30.0 - 36.5 g/dL    RDW 13.2 11.5 - 14.5 %    PLATELET 834 949 - 636 K/uL    MPV 10.1 8.9 - 12.9 FL    NRBC 0.0 0  WBC    ABSOLUTE NRBC 0.00 0.00 - 0.01 K/uL    NEUTROPHILS 88 (H) 32 - 75 %    LYMPHOCYTES 4 (L) 12 - 49 %    MONOCYTES 7 5 - 13 %    EOSINOPHILS 0 0 - 7 %    BASOPHILS 0 0 - 1 %    IMMATURE GRANULOCYTES 1 (H) 0.0 - 0.5 %    ABS. NEUTROPHILS 12.8 (H) 1.8 - 8.0 K/UL    ABS. LYMPHOCYTES 0.6 (L) 0.8 - 3.5 K/UL    ABS. MONOCYTES 1.1 (H) 0.0 - 1.0 K/UL    ABS. EOSINOPHILS 0.1 0.0 - 0.4 K/UL    ABS. BASOPHILS 0.1 0.0 - 0.1 K/UL    ABS. IMM. GRANS. 0.1 (H) 0.00 - 0.04 K/UL    DF AUTOMATED     METABOLIC PANEL, COMPREHENSIVE    Collection Time: 09/08/18  3:15 AM   Result Value Ref Range    Sodium 140 136 - 145 mmol/L    Potassium 4.6 3.5 - 5.1 mmol/L    Chloride 106 97 - 108 mmol/L    CO2 26 21 - 32 mmol/L    Anion gap 8 5 - 15 mmol/L    Glucose 91 65 - 100 mg/dL    BUN 26 (H) 6 - 20 MG/DL    Creatinine 1.60 (H) 0.55 - 1.02 MG/DL    BUN/Creatinine ratio 16 12 - 20      GFR est AA 37 (L) >60 ml/min/1.73m2    GFR est non-AA 31 (L) >60 ml/min/1.73m2    Calcium 8.3 (L) 8.5 - 10.1 MG/DL    Bilirubin, total 0.8 0.2 - 1.0 MG/DL    ALT (SGPT) 17 12 - 78 U/L    AST (SGOT) 16 15 - 37 U/L    Alk.  phosphatase 149 (H) 45 - 117 U/L    Protein, total 6.3 (L) 6.4 - 8.2 g/dL    Albumin 2.5 (L) 3.5 - 5.0 g/dL    Globulin 3.8 2.0 - 4.0 g/dL    A-G Ratio 0.7 (L) 1.1 - 2.2

## 2018-09-08 NOTE — PROGRESS NOTES
POST ANESTHESIA CARE DISCHARGE / TRANSFER NOTE Damaris Hare was   transfered   via   Bed  to    hospital room 518   . Patient was escorted by      nurse   . Patient verbalized   appreciation and was very pleased with care received    throughout their stay. Patient was discharged in     pleasant mood   . Pain at discharge/transfer was    0 /10. Discharge, medication and follow-up instructions were verbalized as understood prior to discharge  (if applicable for same-day procedures being discharged.) All personal belongings have been returned to patient, and patient/family verbally confirm receiving belongings as all present. TRANSFER - OUT REPORT: 
 
Verbal report given to   Royce Schmid RN  receiving   Damaris Hare   and being transferred 5thM/S       for routine post - op  Following General for Procedure(s) (LRB): 
CHOLECYSTECTOMY LAPAROSCOPIC (N/A) by  Aixa Valencia MD. 
 
Patient Situation, Background, Assessment and Recommendations (SBAR) was provided and included information from the following SBAR report(s) (SBAR, OR Summary, MAR and Cardiac Rhythm Controlled Rate A-Fib) which were reviewed with the receiving nurse. Lines:  
Peripheral IV 09/07/18 Left Antecubital (Active) Site Assessment Clean, dry, & intact 9/8/2018 12:53 PM  
Phlebitis Assessment 0 9/8/2018 12:53 PM  
Infiltration Assessment 0 9/8/2018 12:53 PM  
Dressing Status Clean, dry, & intact 9/8/2018 12:53 PM  
Dressing Type Transparent;Tape 9/8/2018 12:53 PM  
Hub Color/Line Status Patent; Flushed;Capped 9/8/2018 12:53 PM  
Action Taken Open ports on tubing capped 9/8/2018  3:05 AM  
Alcohol Cap Used Yes 9/8/2018  3:05 AM  
   
Peripheral IV 09/08/18 Left Hand (Active) Site Assessment Clean, dry, & intact 9/8/2018 12:53 PM  
Phlebitis Assessment 0 9/8/2018 12:53 PM  
Infiltration Assessment 0 9/8/2018 12:53 PM  
Dressing Status Clean, dry, & intact 9/8/2018 12:53 PM  
Dressing Type Transparent;Tape 9/8/2018 12:53 PM  
 Hub Color/Line Status Patent; Infusing 9/8/2018 12:53 PM  
  
Also Reviewed (checked if applicable):   [] NO ADDITIONAL REVIEWS [] PCA Drug and Settings     [] Cold Therapy with extra gels     [] On-Q @  ml/hr  
[] Drains x       [] Significant Wound care/devices (e.g. Wound vac, etc.) [] Holding pt in PACU awaiting bed availability - additional care provided and eMAR review 
[] Vent Settings      [] Critical Care Drips Contact Precautions: There are currently no Active Isolations Patient transported with:  Registered Nurse  O2 BNC 2L Additional Information: Family at bedside, Pt Resting NAD, O2 at 2lpm, VSS,  Questions answered where appropriate. After report, opportunity for questions and clarification was provided.    
Signed: Wanda VAZQUEZN RN-BC

## 2018-09-08 NOTE — PROGRESS NOTES
TRANSFER - IN REPORT: 
 
Verbal report received from ZELDA Parrish (name) on Leobardo Knutson  being received from ED (unit) for routine progression of care Report consisted of patients Situation, Background, Assessment and  
Recommendations(SBAR). Information from the following report(s) SBAR, Kardex and ED Summary was reviewed with the receiving nurse. Opportunity for questions and clarification was provided. Assessment completed upon patients arrival to unit and care assumed. [id number] [facility] Primary Nurse Steve Turk RN and Flo Alston RN performed a dual skin assessment on this patient No impairment noted David score is 20

## 2018-09-08 NOTE — PROGRESS NOTES
Called back for report. Unable to at this time. Will call back. 2129 Attempted to call again for report. Will call back.

## 2018-09-08 NOTE — PROGRESS NOTES
Agustin Singer [facility] [address] 
[address] [facility], [address] [place] [address] 
[phone] Medical Progress Note NAME: Torres Izaguirre :  1932 MRM:  [id number] Date/Time: 2018 Subjective: Chief Complaint:  F/u cholecystitis Chart/notes/labs/studies reviewed, patient examined at bedside. Tolerated cholecystectomy well. No abdominal pain. No fevers. Had chills at home Objective:  
 
 
Vitals:  
 
 
  
Last 24hrs VS reviewed since prior progress note. Most recent are: 
 
Visit Vitals  /57 (BP 1 Location: Right arm, BP Patient Position: At rest)  Pulse 90  Temp 97.3 °F (36.3 °C)  Resp 20  
 Ht 5' 10\" (1.778 m)  Wt 97.1 kg (214 lb)  SpO2 92%  BMI 30.71 kg/m2 SpO2 Readings from Last 6 Encounters:  
18 92% 18 98% 17 95% 17 92% 17 96% 16 94% O2 Flow Rate (L/min): 3 l/min No intake or output data in the 24 hours ending 18 1601 Exam:  
 
Physical Exam: 
 
Gen:  Well-developed, well-nourished, in no acute distress HEENT:  Sclerae nonicteric, hearing intact to voice, mucous membranes moist 
Neck:  Supple, without masses. Resp:  No accessory muscle use, CTAB without wheezes, rales, or rhonchi 
Card: RRR, without m/r/g. No LE edema. Abd:  +bowel sounds, soft, NTTP, nondistended Neuro: Face symmetric, tongue midline, speech fluent, follows commands appropriately Psych:  Alert, oriented to self and hospital. Fair insight Medications Reviewed: (see below) Lab Data Reviewed: (see below) 
 
______________________________________________________________________ Medications:  
 
Current Facility-Administered Medications Medication Dose Route Frequency  dextrose 5 % - 0.45% NaCl infusion  75 mL/hr IntraVENous CONTINUOUS  
 cefepime (MAXIPIME) 2 g in 0.9% sodium chloride (MBP/ADV) 100 mL  2 g IntraVENous Q12H  metroNIDAZOLE (FLAGYL) IVPB premix 500 mg  500 mg IntraVENous Q12H  
 metoprolol (LOPRESSOR) injection 2.5 mg  2.5 mg IntraVENous Q6H  
 sodium chloride (NS) flush 5-10 mL  5-10 mL IntraVENous Q8H  
 sodium chloride (NS) flush 5-10 mL  5-10 mL IntraVENous PRN  
 naloxone (NARCAN) injection 0.4 mg  0.4 mg IntraVENous PRN  
 acetaminophen (TYLENOL) tablet 650 mg  650 mg Oral Q6H PRN  
 morphine injection 2 mg  2 mg IntraVENous Q4H PRN  
 heparin (porcine) injection 5,000 Units  5,000 Units SubCUTAneous Q8H  
 ondansetron (ZOFRAN) injection 4 mg  4 mg IntraVENous Q4H PRN  
 metoprolol succinate (TOPROL-XL) XL tablet 50 mg  50 mg Oral PCL Lab Review:  
 
Recent Labs  
   09/08/18 0315 09/07/18 
 1511 WBC  14.6*  19.8* HGB  11.8  13.2 HCT  37.8  40.7 PLT  165  227 Recent Labs  
   09/08/18 0315 09/07/18 
 1721  09/07/18 
 1511 NA  140   --   137  
K  4.6   --   4.5  
CL  106   --   99  
CO2  26   --   27  
GLU  91   --   102* BUN  26*   --   27* CREA  1.60*   --   1.67* CA  8.3*   --   9.0 MG   --   2.3   --   
ALB  2.5*   --   2.9*  
SGOT  16   --   24 ALT  17   --   25 No components found for: [place] No results for input(s): PH, PCO2, PO2, HCO3, FIO2 in the last 72 hours. No results for input(s): INR in the last 72 hours. No lab exists for component: INREXT Lab Results Component Value Date/Time Specimen Description: BLOOD 05/14/2012 01:10 PM  
 Specimen Description: BLOOD 05/11/2012 10:00 PM  
 Specimen Description: URINE 05/11/2012 10:00 PM  
 
Lab Results Component Value Date/Time Culture result: PENDING 09/08/2018 01:00 PM  
 Culture result: (A) 09/07/2018 05:21 PM  
  GRAM NEGATIVE RODS GROWING IN 1 OF 4 BOTTLES DRAWN (SITE= L AC) Culture result: (A) 09/07/2018 05:21 PM  
  PRELIMINARY REPORT OF GRAM NEGATIVE RODS GROWING IN 1 OF 4 BOTTLES DRAWN CALLED TO AND READ BACK BY Domingo Curtis RN  AT 7414 9/8/18 . Shanell Botello Culture result: REMAINING BOTTLE(S) HAS/HAVE NO GROWTH SO FAR 09/07/2018 05:21 PM  
 
 
 
  
Assessment / Plan:  
 
   
  Acute cholecystitis: s/p lap anam, found to have empyema of GB. 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 Received Zosyn, but with ? allergy to pip/tazo. Changed to cefepime/Flagyl. Repeat blood cultures. Follow speciation/sensitivity Hypothyroidism (): resume home Synthroid Diastolic heart failure () /  (HFpEF) heart failure with preserved ejection fraction class 2: compensated. Monitor volume status on IVF. Continue BB. Diuretics on hold Paroxysmal a-fib: apparently during surgery per report. Monitor on telemetry. Tells me she has history of PSVT 
 
  CKD (chronic kidney disease), stage III (11/16/2017): with mild CARMEL. Follow BMP on IVF. Diuretics on hold Obesity (BMI 30.0-34. 9): Dr. Santhosh Moreno counseled on weight loss Total time spent: 35 minutes, d/w family at bedside Time spent in the care of this patient including reviewing records, discussing with nursing and/or other providers on the treatment team, obtaining history and examining the patient, and discussing treatment plans. Care Plan discussed with: Patient, Nursing Staff and >50% of time spent in counseling and coordination of care Discussed:  Care Plan Prophylaxis:  per surgeon Disposition:  per surgeon 
        
___________________________________________________ Attending Physician: Rogelio Bryan MD

## 2018-09-08 NOTE — BRIEF OP NOTE
BRIEF OPERATIVE NOTE Date of Procedure: 9/8/2018 Preoperative Diagnosis: ACUTE CHOLECYSTITIS Postoperative Diagnosis: ACUTE CHOLECYSTITIS  With empyema of the GB Procedure(s): CHOLECYSTECTOMY LAPAROSCOPIC Surgeon(s) and Role: Lenadr Patiño MD - Primary Surgical Assistant:  
 
Surgical Staff: 
Circ-1: Anjali Strong, RN Scrub Tech-1: [facility] Surg Asst-1: Charlie Jones LPN Event Time In Incision Start  Incision Close Anesthesia: General  
Estimated Blood Loss: minimal 
Specimens:  
ID Type Source Tests Collected by Time Destination 1 : Gallbladder Preservative Gallbladder  Ami Bradshaw MD 9/8/2018 1200 Pathology 1 : Gallbladder bile swab Body Fluid Gallbladder ANAEROBIC/AEROBIC/GRAM STAIN, CULTURE 04 Ami Bradshaw MD 9/8/2018 1203 Microbiology Findings: acute anam with empyema of GB Complications: none Implants: * No implants in log *

## 2018-09-08 NOTE — PROGRESS NOTES
Blood cultures x2 sets drawn 5 min apart - [email or web address], [email or web address] without difficulty. Not IV Abx already have been provided prior to OR, in OR, and flagyl in PACU.

## 2018-09-08 NOTE — OP NOTES
Patient: Dash Velasquez MRN: [id number]  SSN: [id number]    YOB: 1932  Age: 80 y.o. Sex: female        OPERATIVE REPORT - LAPAROSCOPIC CHOLECYSTECTOMY    PREOPERATIVE DIAGNOSIS: Symptomatic cholelithiasis. POSTOPERATIVE DIAGNOSIS: Cholelithiasis with cholecystitis with empyema of the GB    OPERATIVE PROCEDURE:  Laparoscopic cholecystectomy. SURGEON: Pao Vigil MD    ANESTHESIA: General.    ANESTHESIOLOGIST: Dr Valri Fothergill: None    ESTIMATED BLOOD LOSS: Minimal.    INDICATION: Documented in the history and physical.    FINDINGS: The GB was tense and distended with edema of the Gb wall and dense adherence to the liver. On aspirating the Gb, there was pus in the Gb and this was sent for cultures. The cystic duct was slightly wide at 6 mm. Liver was normal. The omentum and duodenum were adherent to the GB. PROCEDURE:     Patient was evaluated in the preop holding area and updates to history and physical was completed. The site of surgery was confirmed with the patient and the site was marked with indelible ink. Patient was then brought to the operating room and general endotracheal anesthesia was induced. The abdomen and groins including the genitals were prepped in the standard manner. The laparoscopic camera and Co2 insufflation apparatus were fixed to the drapes in the usual manner. A time out was completed confirming patient, , procedure, site of surgery, special instruments and equipment needed for the procedure. Through a supraumbilical incision, a Veress needle was introduced and its  intraperitoneal position was confirmed and connected to CO2 insufflation. Pneumoperitoneum was created and maintained at 15 mmHg. The Veress needle  was removed, a 12-mm trocar introduced and laparoscopic camera through  this.  A laparoscopy was performed and it was confirmed that there was no  intraabdominal injury during introduction of pneumoperitoneum and the  trocar. Under direct vision, three 5-mm ports were positioned, one to the  epigastrium, one at the right upper quadrant, and another in the right  flank. Patient was positioned in reverse Trendelenburg with a tilt to the  Left. Initially the Gb could not be identified - the adherent omentum was swept away using traction and suction and the GB fundus was finally identified. Aspiration of the GB was performed and about 100 ml of thick pus was aspirated - this was sent for cultures. The fundus was grasped and lifted up towards the diaphragm. The  infundibulum was retracted laterally and inferiorly after releasing the  adhesions. The junction of the cystic duct and gallbladder was clearly identified, and  an adequate length of the cystic duct was dissected out. Similarly, the cystic  artery was also dissected out and an adequate length was displayed. Critical view of safety of  Calot's triangle was obtained and the structures were clearly identified. After this was satisfactorily done, the cystic duct was doubly clipped on both sides and divided. This was done using the plastic Hemolock clips. The cystic artery was also doubly clipped on both sides and divided. The gallbladder was then gently dissected off from the liver bed  using electrocautery and achieving hemostasis. as the dissection proceeded  upwards towards the fundus. The gallbladder was thus removed from its bed. Using an Endobag through the umbilical port, the gall bladder  was removed intact. Irrigation of the gallbladder bed and the upper quadrant was performed until the returns were clear. Alba 3 gm was sprayed onto the GB bed. No bleeding or bile leak was noted. The  pneumoperitoneum was released and the ports were removed under vision. The umbilical port fascia was closed with 0 Vicryl, and all wounds were  closed with subcuticular 4-0 Monocryl with Dermabond to the skin. Exparel 20 ml expanded with 30 of Marcaine was infiltrated into each port site, prior to making the skin incisions. SPECIMEN: Gallbladder. COUNTS: Correct at the completion of surgery.     Megan Young MD

## 2018-09-08 NOTE — PROGRESS NOTES
Paged by nursing re: + blood cultures, GNRs. Already received Zosyn. Need to continue abx (changed to cefepime + Flagyl) given concerns for pip/tazo allergy. Both Zosyn and Cefepime have great coverage for GNRs. Repeat blood cultures. Monitor speciation / sensitivity

## 2018-09-08 NOTE — ANESTHESIA POSTPROCEDURE EVALUATION
Post-Anesthesia Evaluation and Assessment Patient: Annemarie Salgado MRN: [id number]  SSN: [id number] YOB: 1932  Age: 80 y.o. Sex: female Cardiovascular Function/Vital Signs Visit Vitals  /56  Pulse 89  Temp 36.6 °C (97.8 °F)  Resp 26  
 Ht 5' 10\" (1.778 m)  Wt 97.1 kg (214 lb)  SpO2 96%  BMI 30.71 kg/m2 Patient is status post general anesthesia for Procedure(s): CHOLECYSTECTOMY LAPAROSCOPIC. Nausea/Vomiting: None Postoperative hydration reviewed and adequate. Pain: 
Pain Scale 1: Numeric (0 - 10) (09/08/18 1345) Pain Intensity 1: 1 (09/08/18 1345) Managed Neurological Status:  
Neuro (WDL): Exceptions to WDL (09/08/18 1247) Neuro Neurologic State: Unresponsive; Anesthetized (09/08/18 1247) At baseline Mental Status and Level of Consciousness: Arousable Pulmonary Status:  
O2 Device: Nasal cannula (09/08/18 1345) Adequate oxygenation and airway patent Complications related to anesthesia: None Post-anesthesia assessment completed. No concerns Signed By: Albaro Gonzalez MD   
 September 8, 2018

## 2018-09-08 NOTE — ANESTHESIA PREPROCEDURE EVALUATION
Anesthetic History No history of anesthetic complications Review of Systems / Medical History Patient summary reviewed, nursing notes reviewed and pertinent labs reviewed Pulmonary COPD: mild Smoker (quit 2011) Pertinent negatives: No recent URI Comments: Stable pulmonary nodules in RLL Chronic dry cough Neuro/Psych Headaches Cardiovascular CHF: NYHA Classification II Dysrhythmias (paroxsymal SVT) : SVT Comments: Cardiac clearance this am indicates patient is at low to intermediate risk for this procedure TTE 7/14 - Left ventricle: Systolic function was normal. Ejection fraction was estimated in the range of 60 % to 65 %. No obvious wall motion 
abnormalities identified in the views obtained. Doppler parameters were consistent with abnormal left ventricular relaxation (grade 1 diastolic dysfunction). Tricuspid valve: There was mild regurgitation. GI/Hepatic/Renal 
  
GERD (no symptoms this am): well controlled Renal disease (Acute on chronic renal failure): CRI and ARF Endo/Other Hypothyroidism: well controlled Morbid obesity and arthritis Other Findings Comments: Glaucoma Physical Exam 
 
Airway Mallampati: II 
TM Distance: 4 - 6 cm Neck ROM: normal range of motion Mouth opening: Normal 
 
 Cardiovascular Rhythm: regular Rate: normal 
 
 
 
 Dental 
 
 
Comments: Chipped front upper teeth Pulmonary Breath sounds clear to auscultation Abdominal 
GI exam deferred Other Findings Anesthetic Plan ASA: 3 Anesthesia type: general 
 
 
 
 
Induction: Intravenous Anesthetic plan and risks discussed with: Patient

## 2018-09-08 NOTE — PROGRESS NOTES
Bedside and Verbal shift change report given to Lory Zuleta (oncoming nurse) by Idris Bal (offgoing nurse). Report included the following information SBAR, Kardex and ED Summary.

## 2018-09-08 NOTE — PROGRESS NOTES
Per nursing staff, pt has questionable allergy to pip/tazo. Had received 2 doses without reaction. There are many other alternative antibiotics to treating acute cholecystitis. No known PCN allergy. Has allergy to Cipro. Change Zosyn to cefepime/Flagy and monitor closely for reactions. Discussed with nurse.   
 
Uri Rod MD

## 2018-09-08 NOTE — ROUTINE PROCESS
TRANSFER - OUT REPORT: 
 
Verbal report given to Eleanor Watson on Leobardo Knutson  being transferred to 5th floor for routine progression of care Report consisted of patients Situation, Background, Assessment and  
Recommendations(SBAR). Information from the following report(s) SBAR, ED Summary, [facility] and Recent Results was reviewed with the receiving nurse. Lines:  
Peripheral IV 09/07/18 Left Antecubital (Active) Site Assessment Clean, dry, & intact 9/7/2018  3:10 PM  
Phlebitis Assessment 0 9/7/2018  3:10 PM  
Infiltration Assessment 0 9/7/2018  3:10 PM  
Dressing Type Tape;Transparent 9/7/2018  3:10 PM  
Hub Color/Line Status Blue;Flushed;Patent 9/7/2018  3:10 PM  
Action Taken Blood drawn 9/7/2018  3:10 PM  
  
 
Opportunity for questions and clarification was provided. Patient transported with: 
 [employer]

## 2018-09-09 LAB
ALBUMIN SERPL-MCNC: 2.2 G/DL (ref 3.5–5)
ALBUMIN/GLOB SERPL: 0.6 {RATIO} (ref 1.1–2.2)
ALP SERPL-CCNC: 172 U/L (ref 45–117)
ALT SERPL-CCNC: 25 U/L (ref 12–78)
ANION GAP SERPL CALC-SCNC: 6 MMOL/L (ref 5–15)
AST SERPL-CCNC: 26 U/L (ref 15–37)
BASOPHILS # BLD: 0 K/UL (ref 0–0.1)
BASOPHILS NFR BLD: 0 % (ref 0–1)
BILIRUB SERPL-MCNC: 0.4 MG/DL (ref 0.2–1)
BUN SERPL-MCNC: 24 MG/DL (ref 6–20)
BUN/CREAT SERPL: 19 (ref 12–20)
CALCIUM SERPL-MCNC: 8.4 MG/DL (ref 8.5–10.1)
CHLORIDE SERPL-SCNC: 107 MMOL/L (ref 97–108)
CO2 SERPL-SCNC: 27 MMOL/L (ref 21–32)
CREAT SERPL-MCNC: 1.29 MG/DL (ref 0.55–1.02)
DIFFERENTIAL METHOD BLD: ABNORMAL
EOSINOPHIL # BLD: 0 K/UL (ref 0–0.4)
EOSINOPHIL NFR BLD: 0 % (ref 0–7)
ERYTHROCYTE [DISTWIDTH] IN BLOOD BY AUTOMATED COUNT: 13.2 % (ref 11.5–14.5)
GLOBULIN SER CALC-MCNC: 3.9 G/DL (ref 2–4)
GLUCOSE SERPL-MCNC: 112 MG/DL (ref 65–100)
HCT VFR BLD AUTO: 37.4 % (ref 35–47)
HGB BLD-MCNC: 11.3 G/DL (ref 11.5–16)
IMM GRANULOCYTES # BLD: 0.1 K/UL (ref 0–0.04)
IMM GRANULOCYTES NFR BLD AUTO: 1 % (ref 0–0.5)
LYMPHOCYTES # BLD: 0.4 K/UL (ref 0.8–3.5)
LYMPHOCYTES NFR BLD: 3 % (ref 12–49)
MAGNESIUM SERPL-MCNC: 2.3 MG/DL (ref 1.6–2.4)
MCH RBC QN AUTO: 30.4 PG (ref 26–34)
MCHC RBC AUTO-ENTMCNC: 30.2 G/DL (ref 30–36.5)
MCV RBC AUTO: 100.5 FL (ref 80–99)
MONOCYTES # BLD: 0.6 K/UL (ref 0–1)
MONOCYTES NFR BLD: 5 % (ref 5–13)
NEUTS SEG # BLD: 11.5 K/UL (ref 1.8–8)
NEUTS SEG NFR BLD: 91 % (ref 32–75)
NRBC # BLD: 0 K/UL (ref 0–0.01)
NRBC BLD-RTO: 0 PER 100 WBC
PHOSPHATE SERPL-MCNC: 3.8 MG/DL (ref 2.6–4.7)
PLATELET # BLD AUTO: 174 K/UL (ref 150–400)
PMV BLD AUTO: 10.1 FL (ref 8.9–12.9)
POTASSIUM SERPL-SCNC: 4.9 MMOL/L (ref 3.5–5.1)
PROT SERPL-MCNC: 6.1 G/DL (ref 6.4–8.2)
RBC # BLD AUTO: 3.72 M/UL (ref 3.8–5.2)
RBC MORPH BLD: ABNORMAL
SODIUM SERPL-SCNC: 140 MMOL/L (ref 136–145)
WBC # BLD AUTO: 12.6 K/UL (ref 3.6–11)

## 2018-09-09 PROCEDURE — 74011000258 HC RX REV CODE- 258: Performed by: INTERNAL MEDICINE

## 2018-09-09 PROCEDURE — 84100 ASSAY OF PHOSPHORUS: CPT | Performed by: INTERNAL MEDICINE

## 2018-09-09 PROCEDURE — 36415 COLL VENOUS BLD VENIPUNCTURE: CPT | Performed by: INTERNAL MEDICINE

## 2018-09-09 PROCEDURE — 94760 N-INVAS EAR/PLS OXIMETRY 1: CPT

## 2018-09-09 PROCEDURE — 65660000000 HC RM CCU STEPDOWN

## 2018-09-09 PROCEDURE — 74011250637 HC RX REV CODE- 250/637: Performed by: INTERNAL MEDICINE

## 2018-09-09 PROCEDURE — 77010033678 HC OXYGEN DAILY

## 2018-09-09 PROCEDURE — 83735 ASSAY OF MAGNESIUM: CPT | Performed by: INTERNAL MEDICINE

## 2018-09-09 PROCEDURE — 74011000250 HC RX REV CODE- 250: Performed by: INTERNAL MEDICINE

## 2018-09-09 PROCEDURE — 80053 COMPREHEN METABOLIC PANEL: CPT | Performed by: INTERNAL MEDICINE

## 2018-09-09 PROCEDURE — 74011250637 HC RX REV CODE- 250/637: Performed by: SURGERY

## 2018-09-09 PROCEDURE — 93005 ELECTROCARDIOGRAM TRACING: CPT

## 2018-09-09 PROCEDURE — 74011250636 HC RX REV CODE- 250/636: Performed by: SURGERY

## 2018-09-09 PROCEDURE — 74011250636 HC RX REV CODE- 250/636: Performed by: INTERNAL MEDICINE

## 2018-09-09 PROCEDURE — 85025 COMPLETE CBC W/AUTO DIFF WBC: CPT | Performed by: INTERNAL MEDICINE

## 2018-09-09 RX ADMIN — APIXABAN 5 MG: 5 TABLET, FILM COATED ORAL at 17:11

## 2018-09-09 RX ADMIN — METOPROLOL SUCCINATE 50 MG: 50 TABLET, FILM COATED, EXTENDED RELEASE ORAL at 13:28

## 2018-09-09 RX ADMIN — METRONIDAZOLE 500 MG: 500 INJECTION, SOLUTION INTRAVENOUS at 11:07

## 2018-09-09 RX ADMIN — METOPROLOL TARTRATE 2.5 MG: 1 INJECTION, SOLUTION INTRAVENOUS at 05:00

## 2018-09-09 RX ADMIN — CEFEPIME 2 G: 2 INJECTION, POWDER, FOR SOLUTION INTRAVENOUS at 05:00

## 2018-09-09 RX ADMIN — LEVOTHYROXINE SODIUM 75 MCG: 75 TABLET ORAL at 06:42

## 2018-09-09 RX ADMIN — CEFEPIME 2 G: 2 INJECTION, POWDER, FOR SOLUTION INTRAVENOUS at 16:34

## 2018-09-09 RX ADMIN — VITAMIN D, TAB 1000IU (100/BT) 1000 UNITS: 25 TAB at 08:03

## 2018-09-09 RX ADMIN — DEXTROSE MONOHYDRATE AND SODIUM CHLORIDE 75 ML/HR: 5; .45 INJECTION, SOLUTION INTRAVENOUS at 05:52

## 2018-09-09 RX ADMIN — Medication 10 ML: at 05:01

## 2018-09-09 RX ADMIN — METRONIDAZOLE 500 MG: 500 INJECTION, SOLUTION INTRAVENOUS at 22:13

## 2018-09-09 RX ADMIN — Medication 10 ML: at 22:21

## 2018-09-09 RX ADMIN — HEPARIN SODIUM 5000 UNITS: 5000 INJECTION INTRAVENOUS; SUBCUTANEOUS at 11:07

## 2018-09-09 RX ADMIN — Medication 5 ML: at 14:00

## 2018-09-09 RX ADMIN — ACETAMINOPHEN 650 MG: 325 TABLET ORAL at 16:34

## 2018-09-09 NOTE — PROGRESS NOTES
Cardiology Daily Progress Note Obdulio Dominguez is a 80 y.o. female with past medical history significant for hypertension, PSVT, admitted with cholecystitis is status post cholecystectomy. Postoperatively she had atrial fibrillation with RVR managed with IV Lopressor. She is progressing well without any difficulty. Surgical team has cleared her for p.o. intake. Visit Vitals  BP 98/56 (BP 1 Location: Right arm, BP Patient Position: At rest)  Pulse 86  Temp 98.5 °F (36.9 °C)  Resp 16  
 Ht 5' 10\" (1.778 m)  Wt 214 lb (97.1 kg)  SpO2 94%  BMI 30.71 kg/m2 Intake/Output Summary (Last 24 hours) at 09/09/18 1523 Last data filed at 09/08/18 1601 Gross per 24 hour Intake              100 ml Output                0 ml Net              100 ml General appearance - alert, well appearing, and in no distress Mental status - affect appropriate to mood Eyes - sclera anicteric, moist mucous membranes Neck - supple, no significant adenopathy Chest - clear to auscultation, no wheezes, rales or rhonchi Heart - normal rate, irregular rhythm, normal S1, S2, no murmurs, rubs, clicks or gallops Abdomen - soft, nontender, nondistended, no masses or organomegaly Extremities - peripheral pulses normal, no pedal edema Current Facility-Administered Medications Medication Dose Route Frequency  cefepime (MAXIPIME) 2 g in 0.9% sodium chloride (MBP/ADV) 100 mL  2 g IntraVENous Q12H  
 metroNIDAZOLE (FLAGYL) IVPB premix 500 mg  500 mg IntraVENous Q12H  levothyroxine (SYNTHROID) tablet 150 mcg  150 mcg Oral Once per day on Mon Tue Wed Thu Fri Sat  levothyroxine (SYNTHROID) tablet 75 mcg  75 mcg Oral every Sunday  cholecalciferol (VITAMIN D3) tablet 1,000 Units  1,000 Units Oral DAILY  sodium chloride (NS) flush 5-10 mL  5-10 mL IntraVENous Q8H  
 sodium chloride (NS) flush 5-10 mL  5-10 mL IntraVENous PRN  
  naloxone (NARCAN) injection 0.4 mg  0.4 mg IntraVENous PRN  
 acetaminophen (TYLENOL) tablet 650 mg  650 mg Oral Q6H PRN  
 morphine injection 2 mg  2 mg IntraVENous Q4H PRN  
 heparin (porcine) injection 5,000 Units  5,000 Units SubCUTAneous Q8H  
 ondansetron (ZOFRAN) injection 4 mg  4 mg IntraVENous Q4H PRN  
 metoprolol succinate (TOPROL-XL) XL tablet 50 mg  50 mg Oral PCL Echo 2011 - EF 65%, mild LVH, mild PA HTN Echo 5/2012 - EF 55 % to 60 %. There were no regional wall motion abnormalities. Echo 7/31/14 - EF 60 % to 65 %.grade 1 diastolic dysfunction, mild TR, Assessment: 
 
-Atrial fibrillation with rapid ventricular response -Hypertension 
-Acute cholecystitis status post cholecystectomy 
-Heart failure preserved ejection fraction Plan:  
 
-Discontinue IV Lopressor since she is now able to take p.o. Resume home p.o. medication of Lopressor 50 mg daily. Since her chads score is 2 and she remains in atrial fibrillation I will start her on Eliquis 5 mg p.o. twice daily. 
 
-Okay to transition to rehab. 
        
___________________________________________________ Jorge Alberto Dawson MD, [facility]

## 2018-09-09 NOTE — PROGRESS NOTES
Bedside and Verbal shift change report given to Serene Vizcarra (oncoming nurse) by Corinne Schuller (offgoing nurse). Report included the following information SBAR.

## 2018-09-09 NOTE — PROGRESS NOTES
Agustin Singer [facility] 
[address] 
[phone] Medical Progress Note NAME: Juan Barnhart :  1932 MRM:  [id number] Date/Time: 2018 Subjective: Chief Complaint:  F/u cholecystitis Chart/notes/labs/studies reviewed, patient examined at bedside. Feels well. No abdominal pain, just mild soreness. No CP or SOB. No fevers Objective:  
 
 
Vitals:  
 
 
  
Last 24hrs VS reviewed since prior progress note. Most recent are: 
 
Visit Vitals  BP 98/56 (BP 1 Location: Right arm, BP Patient Position: At rest)  Pulse 86  Temp 98.5 °F (36.9 °C)  Resp 16  
 Ht 5' 10\" (1.778 m)  Wt 97.1 kg (214 lb)  SpO2 94%  BMI 30.71 kg/m2 SpO2 Readings from Last 6 Encounters:  
18 94% 18 98% 17 95% 17 92% 17 96% 16 94% O2 Flow Rate (L/min): 2 l/min Intake/Output Summary (Last 24 hours) at 18 1443 Last data filed at 18 1601 Gross per 24 hour Intake              750 ml Output                0 ml Net              750 ml Exam:  
 
Physical Exam: 
 
Gen:  Well-developed, well-nourished, in no acute distress. Pleasant. Daughter at bedside HEENT:  Sclerae nonicteric, hearing intact to voice, mucous membranes moist 
Neck:  Supple, without masses. Resp:  No accessory muscle use, CTAB without wheezes, rales, or rhonchi 
Card: RRR, without m/r/g. No LE edema Abd:  +bowel sounds, soft, NTTP, nondistended Neuro: Face symmetric, tongue midline, speech fluent, follows commands appropriately Psych:  Alert, oriented to self and hospital. Fair insight Medications Reviewed: (see below) Lab Data Reviewed: (see below) 
 
______________________________________________________________________ Medications:  
 
Current Facility-Administered Medications Medication Dose Route Frequency  dextrose 5 % - 0.45% NaCl infusion  75 mL/hr IntraVENous CONTINUOUS  
 cefepime (MAXIPIME) 2 g in 0.9% sodium chloride (MBP/ADV) 100 mL  2 g IntraVENous Q12H  
 metroNIDAZOLE (FLAGYL) IVPB premix 500 mg  500 mg IntraVENous Q12H  levothyroxine (SYNTHROID) tablet 150 mcg  150 mcg Oral Once per day on Mon Tue Wed Thu Fri Sat  levothyroxine (SYNTHROID) tablet 75 mcg  75 mcg Oral every Sunday  cholecalciferol (VITAMIN D3) tablet 1,000 Units  1,000 Units Oral DAILY  sodium chloride (NS) flush 5-10 mL  5-10 mL IntraVENous Q8H  
 sodium chloride (NS) flush 5-10 mL  5-10 mL IntraVENous PRN  
 naloxone (NARCAN) injection 0.4 mg  0.4 mg IntraVENous PRN  
 acetaminophen (TYLENOL) tablet 650 mg  650 mg Oral Q6H PRN  
 morphine injection 2 mg  2 mg IntraVENous Q4H PRN  
 heparin (porcine) injection 5,000 Units  5,000 Units SubCUTAneous Q8H  
 ondansetron (ZOFRAN) injection 4 mg  4 mg IntraVENous Q4H PRN  
 metoprolol succinate (TOPROL-XL) XL tablet 50 mg  50 mg Oral PCL Lab Review:  
 
Recent Labs  
   09/09/18 
 0237  09/08/18 
 0315  09/07/18 
 1511 WBC  12.6*  14.6*  19.8* HGB  11.3*  11.8  13.2 HCT  37.4  37.8  40.7 PLT  174  165  227 Recent Labs  
   09/09/18 
 0237  09/08/18 
 0315  09/07/18 
 1721  09/07/18 
 1511 NA  140  140   --   137  
K  4.9  4.6   --   4.5  
CL  107  106   --   99  
CO2  27  26   --   27 GLU  112*  91   --   102* BUN  24*  26*   --   27* CREA  1.29*  1.60*   --   1.67* CA  8.4*  8.3*   --   9.0 MG  2.3   --   2.3   --   
PHOS  3.8   --    --    --   
ALB  2.2*  2.5*   --   2.9*  
SGOT  26  16   --   24 ALT  25  17   --   25 No components found for: [place] No results for input(s): PH, PCO2, PO2, HCO3, FIO2 in the last 72 hours. No results for input(s): INR in the last 72 hours. No lab exists for component: INREXT, INREXT Lab Results Component Value Date/Time  Specimen Description: BLOOD 05/14/2012 01:10 PM  
 Specimen Description: BLOOD 05/11/2012 10:00 PM  
 Specimen Description: URINE 05/11/2012 10:00 PM  
 
Lab Results Component Value Date/Time Culture result: NO GROWTH AFTER 17 HOURS 09/08/2018 02:00 PM  
 Culture result: NO GROWTH AFTER 17 HOURS 09/08/2018 01:55 PM  
 Culture result: LIGHT GRAM NEGATIVE RODS (A) 09/08/2018 01:00 PM  
 
 
 
  
Assessment / Plan:  
   
   Acute cholecystitis: s/p lap anam, found to have empyema of GB. 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 Also with UTI, also growing GNRs. Follow intraoperative wound culture (GNRs as well). Continue Cefepime/Flagyl, tolerating abx well. Follow surveillance blood cultures and speciation/sensitivity. Resuming diet, discussed with gen surgery. Hypothyroidism (): continue home Synthroid Diastolic heart failure () /  (HFpEF) heart failure with preserved ejection fraction class 2: compensated. Stop IVF as we are resuming diet. Continue BB. Holding diuretics Paroxysmal a-fib: apparently during surgery per report. Monitor on telemetry. Tells me she has history of PSVT 
 
  CKD (chronic kidney disease), stage III (): with mild CARMEL which is now better and back to baseline. Follow BMP on antibiotics Obesity (BMI 30.0-34. 9): counseled on weight loss Weakness: generalized, due to acute illness. PT/OT evaluation. Likely needs SNF placement. CM consult Total time spent: 25 minutes, d/w daughter and general surgery Dr. Eder Moss Time spent in the care of this patient including reviewing records, discussing with nursing and/or other providers on the treatment team, obtaining history and examining the patient, and discussing treatment plans. Care Plan discussed with: Patient, Nursing Staff and >50% of time spent in counseling and coordination of care Discussed:  Care Plan Prophylaxis:  per surgeon (heparin) Disposition:  per surgeon (likely SNF, PT/OT ordered) ___________________________________________________ Attending Physician: Jefferson Miranda MD

## 2018-09-09 NOTE — PROGRESS NOTES
General Surgery Daily Progress Note Patient: Carmine Morrow MRN: [id number]  SSN: [id number] YOB: 1932  Age: 80 y.o. Sex: female Admit Date: 9/7/2018 Subjective:  
Patient feels much better. No nausea or vomiting. No fever. Current Facility-Administered Medications Medication Dose Route Frequency  dextrose 5 % - 0.45% NaCl infusion  75 mL/hr IntraVENous CONTINUOUS  
 cefepime (MAXIPIME) 2 g in 0.9% sodium chloride (MBP/ADV) 100 mL  2 g IntraVENous Q12H  
 metroNIDAZOLE (FLAGYL) IVPB premix 500 mg  500 mg IntraVENous Q12H  
 metoprolol (LOPRESSOR) injection 2.5 mg  2.5 mg IntraVENous Q6H  
 levothyroxine (SYNTHROID) tablet 150 mcg  150 mcg Oral Once per day on Mon Tue Wed Thu Fri Sat  levothyroxine (SYNTHROID) tablet 75 mcg  75 mcg Oral every Sunday  cholecalciferol (VITAMIN D3) tablet 1,000 Units  1,000 Units Oral DAILY  sodium chloride (NS) flush 5-10 mL  5-10 mL IntraVENous Q8H  
 sodium chloride (NS) flush 5-10 mL  5-10 mL IntraVENous PRN  
 naloxone (NARCAN) injection 0.4 mg  0.4 mg IntraVENous PRN  
 acetaminophen (TYLENOL) tablet 650 mg  650 mg Oral Q6H PRN  
 morphine injection 2 mg  2 mg IntraVENous Q4H PRN  
 heparin (porcine) injection 5,000 Units  5,000 Units SubCUTAneous Q8H  
 ondansetron (ZOFRAN) injection 4 mg  4 mg IntraVENous Q4H PRN  
 metoprolol succinate (TOPROL-XL) XL tablet 50 mg  50 mg Oral PCL Objective:  
  
09/07 1901 - 09/09 0700 In: 750 [I.V.:750] Out: -  
Patient Vitals for the past 8 hrs: 
 BP Temp Pulse Resp SpO2  
09/09/18 0736 107/57 98.6 °F (37 °C) 78 18 93 % 09/09/18 0700 - - 73 - -  
09/09/18 0228 116/57 98.9 °F (37.2 °C) 81 19 97 % Physical Exam: 
General: Alert, cooperative, no distress, appears stated age. Neck:  Supple, symmetrical, trachea midline, no adenopathy, thyroid: no                           enlargement/tenderness/nodules, no carotid bruit and no JVD. Lungs: Clear to auscultation bilaterally. Heart:  Regular rate and rhythm, S1, S2 normal, no murmur, click, rub or gallop. Abdomen: Port sites are clean and dry Soft, non-tender. Bowel sounds normal. No masses,  No organomegaly. Extremities: Extremities normal, atraumatic, no cyanosis or edema. Skin:  Skin color, texture, turgor normal. No rashes or lesions Labs: Recent Labs  
   09/09/18 
 1566 WBC  12.6* HGB  11.3* HCT  37.4 PLT  174 Recent Labs  
   09/09/18 
 7265 NA  140  
K  4.9 CL  107 CO2  27 GLU  112* BUN  24* CREA  1.29* CA  8.4* MG  2.3 PHOS  3.8 ALB  2.2* TBILI  0.4 SGOT  26 ALT  25  
 
X-ray Assessment / Plan: · Recovering well · Positive blood cultures from 9/7 · Awaiting final cultures · Plan for discharge · Patient wants to go to rehab Active Problems: Hypothyroidism () Overview: hx ROSAS for hyperthyroidism Diastolic heart failure ([facility] (4/26/2013) CKD (chronic kidney disease), stage III (11/16/2017) Cholecystitis (9/7/2018) ARF (acute renal failure) ([facility] [facility] (9/7/2018) Obesity (BMI 30.0-34.9) (9/7/2018) (HFpEF) heart failure with preserved ejection fraction ([facility] [address] (5/22/2012) Overview: first presented 2012 in the setting of sepsis

## 2018-09-10 ENCOUNTER — APPOINTMENT (OUTPATIENT)
Dept: GENERAL RADIOLOGY | Age: 83
DRG: 418 | End: 2018-09-10
Attending: INTERNAL MEDICINE
Payer: MEDICARE

## 2018-09-10 ENCOUNTER — TELEPHONE (OUTPATIENT)
Dept: CARDIOLOGY CLINIC | Age: 83
End: 2018-09-10

## 2018-09-10 LAB
ANION GAP SERPL CALC-SCNC: 5 MMOL/L (ref 5–15)
ATRIAL RATE: 267 BPM
ATRIAL RATE: 66 BPM
ATRIAL RATE: 82 BPM
BACTERIA SPEC CULT: ABNORMAL
BACTERIA SPEC CULT: NORMAL
BUN SERPL-MCNC: 28 MG/DL (ref 6–20)
BUN/CREAT SERPL: 21 (ref 12–20)
CALCIUM SERPL-MCNC: 8.5 MG/DL (ref 8.5–10.1)
CALCULATED P AXIS, ECG09: -89 DEGREES
CALCULATED P AXIS, ECG09: 69 DEGREES
CALCULATED R AXIS, ECG10: 55 DEGREES
CALCULATED R AXIS, ECG10: 58 DEGREES
CALCULATED R AXIS, ECG10: 62 DEGREES
CALCULATED T AXIS, ECG11: -25 DEGREES
CALCULATED T AXIS, ECG11: 13 DEGREES
CALCULATED T AXIS, ECG11: 38 DEGREES
CC UR VC: ABNORMAL
CHLORIDE SERPL-SCNC: 106 MMOL/L (ref 97–108)
CO2 SERPL-SCNC: 27 MMOL/L (ref 21–32)
CREAT SERPL-MCNC: 1.34 MG/DL (ref 0.55–1.02)
DIAGNOSIS, 93000: NORMAL
ERYTHROCYTE [DISTWIDTH] IN BLOOD BY AUTOMATED COUNT: 13.2 % (ref 11.5–14.5)
GLUCOSE SERPL-MCNC: 86 MG/DL (ref 65–100)
GRAM STN SPEC: ABNORMAL
GRAM STN SPEC: ABNORMAL
HCT VFR BLD AUTO: 38.7 % (ref 35–47)
HGB BLD-MCNC: 11.8 G/DL (ref 11.5–16)
MAGNESIUM SERPL-MCNC: 2.4 MG/DL (ref 1.6–2.4)
MCH RBC QN AUTO: 30.6 PG (ref 26–34)
MCHC RBC AUTO-ENTMCNC: 30.5 G/DL (ref 30–36.5)
MCV RBC AUTO: 100.5 FL (ref 80–99)
NRBC # BLD: 0 K/UL (ref 0–0.01)
NRBC BLD-RTO: 0 PER 100 WBC
P-R INTERVAL, ECG05: 166 MS
PLATELET # BLD AUTO: 227 K/UL (ref 150–400)
PMV BLD AUTO: 10 FL (ref 8.9–12.9)
POTASSIUM SERPL-SCNC: 5.3 MMOL/L (ref 3.5–5.1)
Q-T INTERVAL, ECG07: 368 MS
Q-T INTERVAL, ECG07: 376 MS
Q-T INTERVAL, ECG07: 396 MS
QRS DURATION, ECG06: 132 MS
QRS DURATION, ECG06: 136 MS
QRS DURATION, ECG06: 140 MS
QTC CALCULATION (BEZET), ECG08: 462 MS
QTC CALCULATION (BEZET), ECG08: 464 MS
QTC CALCULATION (BEZET), ECG08: 487 MS
RBC # BLD AUTO: 3.85 M/UL (ref 3.8–5.2)
SERVICE CMNT-IMP: ABNORMAL
SERVICE CMNT-IMP: ABNORMAL
SERVICE CMNT-IMP: NORMAL
SODIUM SERPL-SCNC: 138 MMOL/L (ref 136–145)
VENTRICULAR RATE, ECG03: 101 BPM
VENTRICULAR RATE, ECG03: 82 BPM
VENTRICULAR RATE, ECG03: 96 BPM
WBC # BLD AUTO: 9 K/UL (ref 3.6–11)

## 2018-09-10 PROCEDURE — 97116 GAIT TRAINING THERAPY: CPT

## 2018-09-10 PROCEDURE — 74011250636 HC RX REV CODE- 250/636: Performed by: INTERNAL MEDICINE

## 2018-09-10 PROCEDURE — 74011250637 HC RX REV CODE- 250/637: Performed by: PHYSICIAN ASSISTANT

## 2018-09-10 PROCEDURE — 97161 PT EVAL LOW COMPLEX 20 MIN: CPT

## 2018-09-10 PROCEDURE — 83735 ASSAY OF MAGNESIUM: CPT | Performed by: INTERNAL MEDICINE

## 2018-09-10 PROCEDURE — 65660000000 HC RM CCU STEPDOWN

## 2018-09-10 PROCEDURE — 74011250637 HC RX REV CODE- 250/637: Performed by: SPECIALIST

## 2018-09-10 PROCEDURE — 71045 X-RAY EXAM CHEST 1 VIEW: CPT

## 2018-09-10 PROCEDURE — 80048 BASIC METABOLIC PNL TOTAL CA: CPT | Performed by: INTERNAL MEDICINE

## 2018-09-10 PROCEDURE — 97165 OT EVAL LOW COMPLEX 30 MIN: CPT

## 2018-09-10 PROCEDURE — 97535 SELF CARE MNGMENT TRAINING: CPT

## 2018-09-10 PROCEDURE — 77010033678 HC OXYGEN DAILY

## 2018-09-10 PROCEDURE — 74011250637 HC RX REV CODE- 250/637: Performed by: INTERNAL MEDICINE

## 2018-09-10 PROCEDURE — 94760 N-INVAS EAR/PLS OXIMETRY 1: CPT

## 2018-09-10 PROCEDURE — 36415 COLL VENOUS BLD VENIPUNCTURE: CPT | Performed by: INTERNAL MEDICINE

## 2018-09-10 PROCEDURE — 77030038269 HC DRN EXT URIN PURWCK BARD -A

## 2018-09-10 PROCEDURE — 74011250636 HC RX REV CODE- 250/636: Performed by: SPECIALIST

## 2018-09-10 PROCEDURE — C8929 TTE W OR WO FOL WCON,DOPPLER: HCPCS

## 2018-09-10 PROCEDURE — 74011000258 HC RX REV CODE- 258: Performed by: INTERNAL MEDICINE

## 2018-09-10 PROCEDURE — 85027 COMPLETE CBC AUTOMATED: CPT | Performed by: INTERNAL MEDICINE

## 2018-09-10 RX ORDER — TRAMADOL HYDROCHLORIDE 50 MG/1
50 TABLET ORAL
Status: DISCONTINUED | OUTPATIENT
Start: 2018-09-10 | End: 2018-09-12 | Stop reason: HOSPADM

## 2018-09-10 RX ORDER — DOCUSATE SODIUM 100 MG/1
100 CAPSULE, LIQUID FILLED ORAL 2 TIMES DAILY
Status: DISCONTINUED | OUTPATIENT
Start: 2018-09-10 | End: 2018-09-12 | Stop reason: HOSPADM

## 2018-09-10 RX ADMIN — CEFEPIME 2 G: 2 INJECTION, POWDER, FOR SOLUTION INTRAVENOUS at 05:00

## 2018-09-10 RX ADMIN — VITAMIN D, TAB 1000IU (100/BT) 1000 UNITS: 25 TAB at 08:50

## 2018-09-10 RX ADMIN — APIXABAN 5 MG: 5 TABLET, FILM COATED ORAL at 17:22

## 2018-09-10 RX ADMIN — DOCUSATE SODIUM 100 MG: 100 CAPSULE, LIQUID FILLED ORAL at 18:44

## 2018-09-10 RX ADMIN — Medication 10 ML: at 13:45

## 2018-09-10 RX ADMIN — Medication 10 ML: at 22:49

## 2018-09-10 RX ADMIN — CEFEPIME 2 G: 2 INJECTION, POWDER, FOR SOLUTION INTRAVENOUS at 17:21

## 2018-09-10 RX ADMIN — METRONIDAZOLE 500 MG: 500 INJECTION, SOLUTION INTRAVENOUS at 22:49

## 2018-09-10 RX ADMIN — LEVOTHYROXINE SODIUM 150 MCG: 150 TABLET ORAL at 06:37

## 2018-09-10 RX ADMIN — PERFLUTREN 2 ML: 6.52 INJECTION, SUSPENSION INTRAVENOUS at 15:13

## 2018-09-10 RX ADMIN — APIXABAN 5 MG: 5 TABLET, FILM COATED ORAL at 08:50

## 2018-09-10 RX ADMIN — METRONIDAZOLE 500 MG: 500 INJECTION, SOLUTION INTRAVENOUS at 13:44

## 2018-09-10 NOTE — PROGRESS NOTES
General Surgery Daily Progress Note Patient: Rafa Ellis MRN: [id number]  SSN: [id number] YOB: 1932  Age: 80 y.o. Sex: female Admit Date: 9/7/2018 Subjective:  
Feeling tired, mild nausea. Reports cough when she lays on her right side. Tolerating diet Current Facility-Administered Medications Medication Dose Route Frequency  metoprolol succinate (TOPROL-XL) XL tablet 75 mg  75 mg Oral DAILY  traMADol (ULTRAM) tablet 50 mg  50 mg Oral Q6H PRN  perflutren lipid microspheres (DEFINITY) contrast injection 2 mL  2 mL IntraVENous ONCE  
 apixaban (ELIQUIS) tablet 5 mg  5 mg Oral BID  cefepime (MAXIPIME) 2 g in 0.9% sodium chloride (MBP/ADV) 100 mL  2 g IntraVENous Q12H  
 metroNIDAZOLE (FLAGYL) IVPB premix 500 mg  500 mg IntraVENous Q12H  levothyroxine (SYNTHROID) tablet 150 mcg  150 mcg Oral Once per day on Mon Tue Wed Thu Fri Sat  levothyroxine (SYNTHROID) tablet 75 mcg  75 mcg Oral every Sunday  cholecalciferol (VITAMIN D3) tablet 1,000 Units  1,000 Units Oral DAILY  sodium chloride (NS) flush 5-10 mL  5-10 mL IntraVENous Q8H  
 sodium chloride (NS) flush 5-10 mL  5-10 mL IntraVENous PRN  
 naloxone (NARCAN) injection 0.4 mg  0.4 mg IntraVENous PRN  
 acetaminophen (TYLENOL) tablet 650 mg  650 mg Oral Q6H PRN  
 morphine injection 2 mg  2 mg IntraVENous Q4H PRN  
 ondansetron (ZOFRAN) injection 4 mg  4 mg IntraVENous Q4H PRN Objective:  
09/10 0701 - 09/10 1900 In: -  
Out: 3791 [[id number]] 09/08 1901 - 09/10 0700 In: 560 [P.O.:360; I.V.:200] Out: 1100 [Urine:1100] Patient Vitals for the past 8 hrs: 
 BP Temp Pulse Resp SpO2  
09/10/18 1140 118/61 98.1 °F (36.7 °C) 95 18 97 % 09/10/18 0716 113/72 99.7 °F (37.6 °C) 99 18 96 % Physical Exam: 
General: Alert, cooperative, NAD Lungs: Unlabored Heart:  Regular rate and  rhythm Abdomen: Soft, appropriately tender, mildlu distended. + bowel sounds. Incisions c/d/i. Extremities: Warm, moves all, no edema Skin:  Warm and dry, no rash Labs: Recent Labs  
   09/10/18 
 0124 WBC  9.0 HGB  11.8 HCT  38.7 PLT  227 Recent Labs  
   09/10/18 
 0124  09/09/18 
 4893 NA  138  140  
K  5.3*  4.9  
CL  106  107 CO2  27  27 GLU  86  112* BUN  28*  24* CREA  1.34*  1.29* CA  8.5  8.4* MG  2.4  2.3 PHOS   --   3.8 ALB   --   2.2* TBILI   --   0.4 SGOT   --   26 ALT   --   25 Assessment / Plan:  
· POD#1 lap anam · Exam appropriate · Diet as tolerated · PO pain meds · Medical management per hospitalist 
· D/c to rehab once medically stable

## 2018-09-10 NOTE — PROGRESS NOTES
Agustin Signer [facility] [address] 
[address], [place], [address] [facility] 
[phone] Medical Progress Note NAME: Truman Willoughby :  1932 MRM:  [id number] Date/Time: 9/10/2018 Subjective: Chief Complaint:  F/u cholecystitis Chart/notes/labs/studies reviewed, patient examined at bedside. Does not feel well today. Mild abd pain with movement. No significant cough, CP, SOB. No F/C Objective:  
 
 
Vitals:  
 
 
  
Last 24hrs VS reviewed since prior progress note. Most recent are: 
 
Visit Vitals  /61 (BP 1 Location: Right arm, BP Patient Position: At rest)  Pulse 95  Temp 98.1 °F (36.7 °C)  Resp 18  Ht 5' 10\" (1.778 m)  Wt 97.1 kg (214 lb)  SpO2 97%  BMI 30.71 kg/m2 SpO2 Readings from Last 6 Encounters:  
09/10/18 97% 18 98% 17 95% 17 92% 17 96% 16 94% O2 Flow Rate (L/min): 2 l/min Intake/Output Summary (Last 24 hours) at 09/10/18 1528 Last data filed at 09/10/18 1251 Gross per 24 hour Intake              340 ml Output             2400 ml Net            -2060 ml Exam:  
 
Physical Exam: 
 
Gen:  Well-developed, well-nourished, in no acute distress. Pleasant. Daughter at bedside HEENT:  Sclerae nonicteric, hearing intact to voice, mucous membranes moist 
Neck:  Supple, without masses. Resp:  No accessory muscle use, mildly diminished in bases otherwise CTAB without wheezes, rales, or rhonchi 
Card: RRR, without m/r/g. No LE edema Abd:  +bowel sounds, soft, NTTP, nondistended Neuro: Face symmetric, tongue midline, speech fluent, follows commands appropriately Psych:  Alert, oriented to self and hospital. Fair insight Medications Reviewed: (see below) Lab Data Reviewed: (see below) 
 
______________________________________________________________________ Medications:  
 
Current Facility-Administered Medications Medication Dose Route Frequency  metoprolol succinate (TOPROL-XL) XL tablet 75 mg  75 mg Oral DAILY  traMADol (ULTRAM) tablet 50 mg  50 mg Oral Q6H PRN  
 apixaban (ELIQUIS) tablet 5 mg  5 mg Oral BID  cefepime (MAXIPIME) 2 g in 0.9% sodium chloride (MBP/ADV) 100 mL  2 g IntraVENous Q12H  
 metroNIDAZOLE (FLAGYL) IVPB premix 500 mg  500 mg IntraVENous Q12H  levothyroxine (SYNTHROID) tablet 150 mcg  150 mcg Oral Once per day on Mon Tue Wed Thu Fri Sat  levothyroxine (SYNTHROID) tablet 75 mcg  75 mcg Oral every Sunday  cholecalciferol (VITAMIN D3) tablet 1,000 Units  1,000 Units Oral DAILY  sodium chloride (NS) flush 5-10 mL  5-10 mL IntraVENous Q8H  
 sodium chloride (NS) flush 5-10 mL  5-10 mL IntraVENous PRN  
 naloxone (NARCAN) injection 0.4 mg  0.4 mg IntraVENous PRN  
 acetaminophen (TYLENOL) tablet 650 mg  650 mg Oral Q6H PRN  
 morphine injection 2 mg  2 mg IntraVENous Q4H PRN  
 ondansetron (ZOFRAN) injection 4 mg  4 mg IntraVENous Q4H PRN Lab Review:  
 
Recent Labs  
   09/10/18 
 0124  09/09/18 
 0237  09/08/18 
 0315 WBC  9.0  12.6*  14.6* HGB  11.8  11.3*  11.8 HCT  38.7  37.4  37.8 PLT  227  174  165 Recent Labs  
   09/10/18 
 0124  09/09/18 
 0237  09/08/18 
 0315  09/07/18 
 1721 NA  138  140  140   --   
K  5.3*  4.9  4.6   --   
CL  106  107  106   --   
CO2  27  27  26   --   
GLU  86  112*  91   --   
BUN  28*  24*  26*   --   
CREA  1.34*  1.29*  1.60*   --   
CA  8.5  8.4*  8.3*   --   
MG  2.4  2.3   --   2.3 PHOS   --   3.8   --    --   
ALB   --   2.2*  2.5*   --   
SGOT   --   26  16   --   
ALT   --   25  17   -- No components found for: [place] No results for input(s): PH, PCO2, PO2, HCO3, FIO2 in the last 72 hours. No results for input(s): INR in the last 72 hours. No lab exists for component: INREXT, INREXT Lab Results Component Value Date/Time  Specimen Description: BLOOD 05/14/2012 01:10 PM  
 Specimen Description: BLOOD 05/11/2012 10:00 PM  
 Specimen Description: URINE 05/11/2012 10:00 PM  
 
Lab Results Component Value Date/Time Culture result: NO GROWTH 2 DAYS 09/08/2018 02:00 PM  
 Culture result: NO GROWTH 2 DAYS 09/08/2018 01:55 PM  
 Culture result: LIGHT ESCHERICHIA COLI (A) 09/08/2018 01:00 PM  
 Culture result: LIGHT KLEBSIELLA PNEUMONIAE (A) 09/08/2018 01:00 PM  
 Culture result: NO ANAEROBES ISOLATED 09/08/2018 01:00 PM  
 
 
 
  
Assessment / Plan:  
   
   Acute cholecystitis: s/p lap anam, found to have empyema of GB. 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 Coli. Also with UTI, growing Klebsiella. Intraoperative wound culture grew both species. Continue Cefepime/Flagyl given empyema with bacteremia. ID consult for recs on antibiotics at discharge given allergy to quinolones. Abnormal CXR: not coughing. Likely atelectasis and not pneumonia. Encouraged use of incentive spirometer, demonstrating proper use Hypothyroidism (): continue LT4 Diastolic heart failure () /  (HFpEF) heart failure with preserved ejection fraction class 2: compensated. Off IVF. Continue BB (cards increased dose). Holding diuretics Paroxysmal a-fib: newly diagnosed 9/7. Monitor on telemetry. Tells me she has history of PSVT. Appreciate cardiology input, who increased metoprolol and added Eliquis CKD (chronic kidney disease), stage III (): with mild CARMEL which is now better and back to baseline. Follow BMP on antibiotics Obesity (BMI 30.0-34. 9): counseled on weight loss Weakness: generalized, due to acute illness. PT/OT evaluation. Likely needs SNF placement. CM consult Total time spent: 35 minutes, d/w daughter at bedside Time spent in the care of this patient including reviewing records, discussing with nursing and/or other providers on the treatment team, obtaining history and examining the patient, and discussing treatment plans. Care Plan discussed with: Patient, Nursing Staff and >50% of time spent in counseling and coordination of care Discussed:  Care Plan Prophylaxis:  per surgeon (heparin) Disposition:  per surgeon (likely SNF, PT/OT ordered) 
        
___________________________________________________ Attending Physician: Lizzie Melgar MD

## 2018-09-10 NOTE — PROGRESS NOTES
Problem: Self Care Deficits Care Plan (Adult) Goal: *Acute Goals and Plan of Care (Insert Text) Occupational Therapy Goals Initiated 9/10/2018 1. Patient will perform lower body dressing, using AE PRN, with supervision/set-up within 7 day(s). 2.  Patient will perform upper body dressing with supervision/set-up within 7 day(s). 3.  Patient will perform grooming, standing at sink, with supervision/set-up within 7 day(s). 4.  Patient will perform toilet transfers with supervision/set-up within 7 day(s). 5.  Patient will perform all aspects of toileting with minimal assistance/contact guard assist within 7 day(s). 6.  Patient will participate in upper extremity therapeutic exercise/activities with supervision/set-up for 10 minutes within 7 day(s). Occupational Therapy EVALUATION Patient: Damaris Hare (3 y.o. female) Date: 9/10/2018 Primary Diagnosis: ACUTE CHOLECYSTITIS Cholecystitis Procedure(s) (LRB): 
CHOLECYSTECTOMY LAPAROSCOPIC (N/A) 2 Days Post-Op Precautions:   Fall ASSESSMENT : 
Based on the objective data described below, the patient presents with hospital admission secondary to acute cholecystitis. Patient now POD 2 following lap anam. Patient agreeable to OT evaluation today. Patient with decreased functional mobility, activity tolerance, abdominal pain following surgery, general weakness and premorbid impaired sensation to bilateral feet. Patient reports living alone and able to manage ADLs but uses rollator in home and holds to shopping carts when out. Patient requires mod assist x 2 to move to EOB today and O2 sats decreased to mid 80s on room air. O2 returned to patient. Patient requires protective brief for incontinence but unable to manage over feet at EOB and requires mod assist to complete. Sit to stand with min x 1 using RW for support.   Patient will benefit form continued OT services to increase safety and independence with ADLs. Patient not safe to return home alone at this time and is agreeable to goals of independence. Patient reports having been to inpatient rehab in the past and agreeable to return if needed. Recommend inpatient rehab at discharge. Patient will benefit from skilled intervention to address the above impairments. Patients rehabilitation potential is considered to be Good Factors which may influence rehabilitation potential include:  
[x]             None noted []             Mental ability/status []             Medical condition []             Home/family situation and support systems []             Safety awareness []             Pain tolerance/management 
[]             Other: PLAN : 
Recommendations and Planned Interventions: 
[x]               Self Care Training                  [x]        Therapeutic Activities [x]               Functional Mobility Training    []        Cognitive Retraining 
[x]               Therapeutic Exercises           [x]        Endurance Activities [x]               Balance Training                   []        Neuromuscular Re-Education []               Visual/Perceptual Training     [x]   Home Safety Training 
[x]               Patient Education                 [x]        Family Training/Education []               Other (comment): Frequency/Duration: Patient will be followed by occupational therapy 5 times a week to address goals. Discharge Recommendations: Inpatient Rehab Further Equipment Recommendations for Discharge: TBD SUBJECTIVE:  
Patient stated I want to get stronger and go home .  OBJECTIVE DATA SUMMARY:  
HISTORY:  
Past Medical History:  
Diagnosis Date  (HFpEF) heart failure with preserved ejection fraction ([facility] [address] 05/22/2012  
 first presented 2012 in the setting of sepsis  Anemia   
 hx b12 def age 22-31s  COPD (chronic obstructive pulmonary disease) ([facility] [address] 7/2011 FEV1 1 L 7/2011. Dr. Hiram Teague  GERD (gastroesophageal reflux disease)  Glaucoma suspect of both eyes Dr Bailee Almanza  Hemorrhoids  Migraine  Mixed stress and urge urinary incontinence  OA (osteoarthritis) of knee R, Dr. Gely Moss. Euflexa injections x3. has walker  Obesity (BMI 30.0-34.9) 9/7/2018  Osteoporosis  Other postablative hypothyroidism 1987 ROSAS 1987  saw Dr. Sharon Ramos  Paresthesia of foot, bilateral   
 Psoriasis   
 left ear  PSVT (paroxysmal supraventricular tachycardia) ([address] 5/19/2012 Dr. Khanh Gambino  Pulmonary nodules 7/2011 RLL x2.  stable 2/2012  Dr. 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  
Procedure Laterality Date  ECHO 2D ADULT  7/15/11  
 mild LVH, EF 65%, normal atrial sizes, indeterminate diastolic function, no sig valvular disease, RVSP 41  
 ECHO 2D ADULT  5/19/12  
 mild LVH, EF 55-60%  HX CATARACT REMOVAL Dr. Bailee Almanza, bilaterally  HX COLONOSCOPY  6/28/12  
 2 polyps. Dr. James Phterry  HX HYSTERECTOMY  1974  
 menorrhagia  HX OTHER SURGICAL Echo 7/31/14 - LVEF 60-65%, grade 1 DD  HX TONSILLECTOMY  NM CARDIAC SPECT W STRS/REST MULT  7/18/11  
 small sized fixed defect in inferolateral wall may represent scar or abd attenuation; EF 80%  VAS LOWER EXT ART PVR MULT LEVEL SEG PRESSURES  7/18/11  
 normal  
 
 
Prior Level of Function/Environment/Context: mod I Occupations in which the patient is/was successful, what are the barriers preventing that success:  
Performance Patterns (routines, roles, habits, and rituals):  
Personal Interests and/or values:  
Expanded or extensive additional review of patient history:  
 
Home Situation Home Environment: Private residence # Steps to Enter: 6 Rails to Enter: Yes One/Two Story Residence: One story Living Alone: Yes Support Systems: Child(ronald), Family member(s) Patient Expects to be Discharged to[de-identified] Private residence Current DME Used/Available at Home: [employer], rolling, Walker, rollator, [place], straight, Grab bars, Shower chair Tub or Shower Type: Tub/Shower combination Hand dominance: Right EXAMINATION OF PERFORMANCE DEFICITS: 
Cognitive/Behavioral Status: 
Neurologic State: Alert Orientation Level: Oriented X4 Cognition: Appropriate decision making Perception: Appears intact Perseveration: No perseveration noted Safety/Judgement: Awareness of environment Skin: intact as seen Edema: none noted Hearing: Auditory Auditory Impairment: None Vision/Perceptual:   
    
    
    
  
    
    
Corrective Lenses: Glasses Range of Motion: 
AROM: Generally decreased, functional 
  
  
  
  
  
  
  
 
Strength: 
Strength: Generally decreased, functional 
  
  
  
  
 
Coordination: 
Coordination: Within functional limits Fine Motor Skills-Upper: Left Intact; Right Intact Gross Motor Skills-Upper: Left Intact; Right Intact Tone & Sensation: 
 
Tone: Normal 
Sensation:  (Reports tingling in B feet, worse when dependent) Balance: 
Sitting: Intact; Without support Standing: Impaired Standing - Static: Good;Constant support Standing - Dynamic : Fair Functional Mobility and Transfers for ADLs: 
Bed Mobility: 
Rolling: Minimum assistance Supine to Sit: Moderate assistance Transfers: 
Sit to Stand: Minimum assistance;Assist x1 Stand to Sit: Minimum assistance;Assist x1 (poor eccentric control) Bed to Chair: Minimum assistance;Assist x1;Additional time Toilet Transfer : Moderate assistance;Assist x1 (low seat) ADL Assessment: 
Feeding: Supervision Oral Facial Hygiene/Grooming: Minimum assistance (standing at sink) Bathing: Moderate assistance (distal LE dressing) Upper Body Dressing: Contact guard assistance Lower Body Dressing: Moderate assistance Toileting: Moderate assistance ADL Intervention and task modifications: 
  
 
  
 
  
 
  
 
  
 
  
 
  
 
Cognitive Retraining Safety/Judgement: Awareness of environment Therapeutic Exercise: 
  
Functional Measure: 
Barthel Index: 
 
Bathin Bladder: 0 Bowels: 5 Groomin Dressin Feeding: 10 Mobility: 0 Stairs: 0 Toilet Use: 5 Transfer (Bed to Chair and Back): 10 Total: 35 Barthel and G-code impairment scale: 
Percentage of impairment CH 
0% CI 
1-19% CJ 
20-39% CK 
40-59% CL 
60-79% CM 
80-99% CN 
100% Barthel Score 0-100 100 99-80 79-60 59-40 20-39 1-19 
 0 Barthel Score 0-20 20 17-19 13-16 9-12 5-8 1-4 0 The Barthel ADL Index: Guidelines 1. The index should be used as a record of what a patient does, not as a record of what a patient could do. 2. The main aim is to establish degree of independence from any help, physical or verbal, however minor and for whatever reason. 3. The need for supervision renders the patient not independent. 4. A patient's performance should be established using the best available evidence. Asking the patient, friends/relatives and nurses are the usual sources, but direct observation and common sense are also important. However direct testing is not needed. 5. Usually the patient's performance over the preceding 24-48 hours is important, but occasionally longer periods will be relevant. 6. Middle categories imply that the patient supplies over 50 per cent of the effort. 7. Use of aids to be independent is allowed. Darryle Chant., Barthel, D.W. ([id number]). Functional evaluation: the Barthel Index. [address] [facility] (14)2. [place] ELIO Thurman, Cristopher Benitez., Zayda Martin., [facility], [address] [facility] (). Measuring the change indisability after inpatient rehabilitation; comparison of the responsiveness of the Barthel Index and Functional [place] Measure. Journal of Neurology, Neurosurgery, and Psychiatry, 66(4), 029-502. CUAUHTEMOC Waldron, MARTHA Lam, & Daina Garces M.A. (2004.) Assessment of post-stroke quality of life in cost-effectiveness studies: The usefulness of the Barthel Index and the EuroQoL-5D. [facility], 13, 850-98 G codes: In compliance with CMSs Claims Based Outcome Reporting, the following G-code set was chosen for this patient based on their primary functional limitation being treated: The outcome measure chosen to determine the severity of the functional limitation was the Barthel Index  with a score of 35/100 which was correlated with the impairment scale. ? Self Care:  
  - CURRENT STATUS: CL - 60%-79% impaired, limited or restricted  - GOAL STATUS: CK - 40%-59% impaired, limited or restricted  - D/C STATUS:  ---------------To be determined--------------- Occupational Therapy Evaluation Charge Determination History Examination Decision-Making LOW Complexity : Brief history review  LOW Complexity : 1-3 performance deficits relating to physical, cognitive , or psychosocial skils that result in activity limitations and / or participation restrictions  LOW Complexity : No comorbidities that affect functional and no verbal or physical assistance needed to complete eval tasks Based on the above components, the patient evaluation is determined to be of the following complexity level: LOW Pain: 
  
  
  
  
  
  
Activity Tolerance: VSS Please refer to the flowsheet for vital signs taken during this treatment. After treatment:  
[x] Patient left in no apparent distress sitting up in chair 
[] Patient left in no apparent distress in bed 
[x] Call bell left within reach [x] Nursing notified 
[x] Caregiver present 
[] Bed alarm activated COMMUNICATION/EDUCATION:  
The patients plan of care was discussed with: Physical Therapist and Registered Nurse.  
[x] Home safety education was provided and the patient/caregiver indicated understanding. [x] Patient/family have participated as able in goal setting and plan of care. [x] Patient/family agree to work toward stated goals and plan of care. [] Patient understands intent and goals of therapy, but is neutral about his/her participation. [] Patient is unable to participate in goal setting and plan of care. This patients plan of care is appropriate for delegation to GEOVANNA. Thank you for this referral. 
Wilver Lee, OTR/L Time Calculation: 32 mins

## 2018-09-10 NOTE — PROGRESS NOTES
9/10/2018 Reason for Admission:   Acute anam - s/p lap anam; bacteremia RRAT Score:    16 Do you (patient/family) have any concerns for transition/discharge? None voiced Plan for utilizing home health:     No 
 
Likelihood of readmission? Mod/yellow Transition of Care Plan:   Inpt rehab desired 9/10/2018  CARE MANAGEMENT NOTE:  CM is following pt for initial discharge planning. EMR reviewed. CM met with pt who was her own historian for this needs assessment. Reportedly, pt resides alone in a one story home with 3 front steps and 6 to 8 stairs with railing at the back entrance. Her dtr, Radha Owen resides in [place] [phone]). Her step-dtr, Johnny Toney ([phone]) resides locally and she is an alternate family contact. PTA, pt was ambulatory with a rollator indoors and a rolling walker outside. PT reports being indepn with ADLs, and drives. She has RX drug coverage and uses [facility] in [facility]. Pt does not have home healthcare. DME in the home includes a rolling walker, rollator, and shower chair. PCP is Dr. Sandhya Perez. CM notified Nurse Navigator of pt's admission. Plan is inpt rehab at [facility]. Will make referral per pt request.  She declines SNF level of rehab. [place]

## 2018-09-10 NOTE — PROGRESS NOTES
Bedside and Verbal shift change report given to Dilan Parks rn  (oncoming nurse) by Kenton Dykes  (offgoing nurse). Report included the following information SBAR and Kardex.

## 2018-09-10 NOTE — TELEPHONE ENCOUNTER
----- Message from Isai Muniz MD sent at 9/10/2018  4:05 PM EDT -----  Regarding: FW: Prescription Question  Contact: [phone]  Call daughter - need to increase dose for tighter HR control of her afib. Will talk to patient more tomorrow. SK      ----- Message -----     From: Dameon Momin RN     Sent: 9/10/2018   3:12 PM       To: Isai Muniz MD  Subject: FW: Prescription Question                            ----- Message -----     From: Bhargav Traylor     Sent: 9/10/2018   2:17 PM       To: [facility] Nurse Pool  Subject: Prescription Question                            Today in hospital nurse had 2 pills of the toprol (50 and 25) for total of 75mg. My Mother refused to take because she only had been prescribed 50 mg. Nurse said it was changed today. Please confirm because this was not discussed during morning consult with my mom who is the patient. Thanks!   Lala Lake (daughter)

## 2018-09-10 NOTE — PROGRESS NOTES
Problem: Mobility Impaired (Adult and Pediatric) Goal: *Acute Goals and Plan of Care (Insert Text) Physical Therapy Goals Initiated 9/10/2018 1. Patient will move from supine to sit and sit to supine , scoot up and down and roll side to side in bed with supervision/set-up within 7 day(s). 2.  Patient will transfer from bed to chair and chair to bed with supervision/set-up using the least restrictive device within 7 day(s). 3.  Patient will perform sit to stand with supervision/set-up within 7 day(s). 4.  Patient will ambulate with supervision/set-up for 200 feet with the least restrictive device within 7 day(s). 5.  Patient will ascend/descend 6 stairs with 2 handrail(s) with minimal assistance/contact guard assist within 7 day(s). physical Therapy EVALUATION Patient: Minerva Cobb (1 y.o. female) Date: 9/10/2018 Primary Diagnosis: ACUTE CHOLECYSTITIS Cholecystitis Procedure(s) (LRB): 
CHOLECYSTECTOMY LAPAROSCOPIC (N/A) 2 Days Post-Op Precautions:   Fall ASSESSMENT : 
Based on the objective data described below, the patient presents with impaired functional mobility tolerance and independence due to generalized weakness, impaired gait and balance, impaired sensation B feet, and decreased cardiopulmonary function s/p admission for acute cholecystitis POD #2 from cholecystectomy. Patient overall presents today needing CGA to MOD A for functional mobility. Trialed patient off O2 for transfer from supine to sitting EOB (MOD A x 1-2) but desat'd to low/mid 80s even with PLB, O2 replaced for remainder of session. Completed sit to stand with MIN A x 1 with RW support, cues needed for hand placement, ambulated with RW around room with no LOB but fatigue with only 20'. Patient left up in chair with all needs in reach, daughter present. Patient lives alone in a one level home with 6 ANDERS back door with rails.   PTA patient used RW or rollator for functional mobility, denies falls. Kenneth lives in town but otherwise does not have in-town family that can assist at home at d/c. Would benefit from skilled PT in acute setting to work to improve endurance and independence with mobility. Recommend IP Rehab at d/c as patient highly motivated to fully regain PLOF and would not be able or safe to d/c home and perform ADLs, manage her home without assistance. Patient will benefit from skilled intervention to address the above impairments. Patients rehabilitation potential is considered to be Excellent Factors which may influence rehabilitation potential include:  
[]         None noted 
[]         Mental ability/status [x]         Medical condition 
[x]         Home/family situation and support systems 
[]         Safety awareness 
[]         Pain tolerance/management 
[]         Other: PLAN : 
Recommendations and Planned Interventions: 
[x]           Bed Mobility Training             [x]    Neuromuscular Re-Education 
[x]           Transfer Training                   []    Orthotic/Prosthetic Training 
[x]           Gait Training                         []    Modalities [x]           Therapeutic Exercises           []    Edema Management/Control 
[x]           Therapeutic Activities            [x]    Patient and Family Training/Education 
[]           Other (comment): Frequency/Duration: Patient will be followed by physical therapy  5 times a week to address goals. Discharge Recommendations: Inpatient Rehab Further Equipment Recommendations for Discharge: TBD, likely has needed items SUBJECTIVE:  
Patient stated I don't think I can go home, I want to do rehab again.  OBJECTIVE DATA SUMMARY:  
HISTORY:   
Past Medical History:  
Diagnosis Date  (HFpEF) heart failure with preserved ejection fraction ([facility] [address] 05/22/2012  
 first presented 2012 in the setting of sepsis  Anemia   
 hx b12 def age 22-31s  COPD (chronic obstructive pulmonary disease) ([facility] [address] 7/2011 FEV1 1 L 7/2011. Dr. Miller Cunha  GERD (gastroesophageal reflux disease)  Glaucoma suspect of both eyes Dr Martha Martins  Hemorrhoids  Migraine  Mixed stress and urge urinary incontinence  OA (osteoarthritis) of knee R, Dr. Olivia Diggs. Euflexa injections x3. has walker  Obesity (BMI 30.0-34.9) 9/7/2018  Osteoporosis  Other postablative hypothyroidism 1987 ROSAS 1987  saw Dr. Bolivar Arrieta  Paresthesia of foot, bilateral   
 Psoriasis   
 left ear  PSVT (paroxysmal supraventricular tachycardia) ([facility] [address] 5/19/2012 Dr. Rosa Vallejo  Pulmonary nodules 7/2011 RLL x2.  stable 2/2012  Dr. 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  
Procedure Laterality Date  ECHO 2D ADULT  7/15/11  
 mild LVH, EF 65%, normal atrial sizes, indeterminate diastolic function, no sig valvular disease, RVSP 41  
 ECHO 2D ADULT  5/19/12  
 mild LVH, EF 55-60%  HX CATARACT REMOVAL Dr. Martha Martins, bilaterally  HX COLONOSCOPY  6/28/12  
 2 polyps. Dr. Brielle Retana  HX HYSTERECTOMY  1974  
 menorrhagia  HX OTHER SURGICAL Echo 7/31/14 - LVEF 60-65%, grade 1 DD  HX TONSILLECTOMY  NM CARDIAC SPECT W STRS/REST MULT  7/18/11  
 small sized fixed defect in inferolateral wall may represent scar or abd attenuation; EF 80%  VAS LOWER EXT ART PVR MULT LEVEL SEG PRESSURES  7/18/11  
 normal  
 
Prior Level of Function/Home Situation: lives alone, mod indep PTA, drives Personal factors and/or comorbidities impacting plan of care: family situation, COPD Home Situation Home Environment: Private residence # Steps to Enter: 6 Rails to Enter: Yes One/Two Story Residence: One story Living Alone: Yes Support Systems: Child(ronald), Family member(s) Patient Expects to be Discharged to[de-identified] Private residence Current DME Used/Available at Home: Connee Simmering, rolling, Walker, rollator, Joe Cotta, straight, Grab bars, Shower chair Tub or Shower Type: Tub/Shower combination EXAMINATION/PRESENTATION/DECISION MAKING:  
Critical Behavior: 
Neurologic State: Alert Orientation Level: Oriented X4 Cognition: Appropriate decision making Safety/Judgement: Awareness of environment Hearing: Auditory Auditory Impairment: None Skin:  See nursing (noted erythema in B feet with dependence, mild at first) Edema: none Range Of Motion: 
AROM: Generally decreased, functional 
  
  
  
  
  
  
  
Strength:   
Strength: Generally decreased, functional 
  
  
  
  
  
  
Tone & Sensation:  
Tone: Normal 
  
  
  
  
Sensation:  (Reports tingling in B feet, worse when dependent) Coordination: 
Coordination: Within functional limits Vision:  
Corrective Lenses: Glasses Functional Mobility: 
Bed Mobility: 
Rolling: Minimum assistance Supine to Sit: Moderate assistance Transfers: 
Sit to Stand: Minimum assistance;Assist x1 Stand to Sit: Minimum assistance;Assist x1 (poor eccentric control) Balance:  
Sitting: Intact; Without support Standing: Impaired Standing - Static: Good;Constant support Standing - Dynamic : Fair Ambulation/Gait Training: 
Distance (ft): 20 Feet (ft) Assistive Device: Gait belt;Walker, rolling Ambulation - Level of Assistance: Contact guard assistance Gait Abnormalities: Decreased step clearance Base of Support: Widened Speed/Nancy: Pace decreased (<100 feet/min) Step Length: Right shortened;Left shortened Functional Measure: 
Tinetti test: 
 
Sitting Balance: 1 Arises: 0 Attempts to Rise: 0 Immediate Standing Balance: 1 Standing Balance: 1 Nudged: 1 Eyes Closed: 0 Turn 360 Degrees - Continuous/Discontinuous: 0 Turn 360 Degrees - Steady/Unsteady: 1 Sitting Down: 0 Balance Score: 5 Indication of Gait: 1 R Step Length/Height: 1 L Step Length/Height: 1 
R Foot Clearance: 1 L Foot Clearance: 1 Step Symmetry: 1 Step Continuity: 1 Path: 1 Trunk: 1 Walking Time: 0 Gait Score: 9 Total Score: 14 Tinetti Test and G-code impairment scale: 
Percentage of Impairment CH 
 
0% 
 CI 
 
1-19% CJ 
 
20-39% CK 
 
40-59% CL 
 
60-79% CM 
 
80-99% CN  
 
100% Tinetti Score 0-28 28 23-27 17-22 12-16 6-11 1-5 0 Tinetti Tool Score Risk of Falls 
<19 = High Fall Risk 19-24 = Moderate Fall Risk 25-28 = Low Fall Risk Tinetti ME. Performance-Oriented Assessment of Mobility Problems in Elderly Patients. Purcell 66; [id number]. (Scoring Description: PT Bulletin Feb. 10, 1993) Older adults: Donte Soliman et al, 2009; n = [address] [employer] elderly evaluated with ABC, FAROOQ, ADL, and IADL) · Mean FAROOQ score for males aged 69-68 years = 26.21(3.40) · Mean FAROOQ score for females age 69-68 years = 25.16(4.30) · Mean FAROOQ score for males over 80 years = 23.29(6.02) · Mean FAROOQ score for females over 80 years = 17.20(8.32) G codes: In compliance with CMSs Claims Based Outcome Reporting, the following G-code set was chosen for this patient based on their primary functional limitation being treated: The outcome measure chosen to determine the severity of the functional limitation was the TInetti with a score of 14/28  which was correlated with the impairment scale. ? Mobility - Walking and Moving Around:  
  - CURRENT STATUS: CK - 40%-59% impaired, limited or restricted  - GOAL STATUS: CI - 1%-19% impaired, limited or restricted  - D/C STATUS:  ---------------To be determined--------------- Physical Therapy Evaluation Charge Determination History Examination Presentation Decision-Making MEDIUM  Complexity : 1-2 comorbidities / personal factors will impact the outcome/ POC  MEDIUM Complexity : 3 Standardized tests and measures addressing body structure, function, activity limitation and / or participation in recreation  MEDIUM Complexity : Evolving with changing characteristics  Other outcome measures Tinetti 14/28  MEDIUM Based on the above components, the patient evaluation is determined to be of the following complexity level: MEDIUM Pain: 
  
  
  
  
  
  
Activity Tolerance: Low - 20' with RW with mild SOB but SpO2 > 90% on 2L O2 Please refer to the flowsheet for vital signs taken during this treatment. After treatment:  
[x]         Patient left in no apparent distress sitting up in chair 
[]         Patient left in no apparent distress in bed 
[x]         Call bell left within reach [x]         Nursing notified 
[x]         Caregiver present 
[]         Bed alarm activated COMMUNICATION/EDUCATION:  
The patients plan of care was discussed with: Registered Nurse. [x]         Fall prevention education was provided and the patient/caregiver indicated understanding. []         Patient/family have participated as able in goal setting and plan of care. [x]         Patient/family agree to work toward stated goals and plan of care. []         Patient understands intent and goals of therapy, but is neutral about his/her participation. []         Patient is unable to participate in goal setting and plan of care. Thank you for this referral. 
Leesa Peñaloza, PT Time Calculation: 27 mins

## 2018-09-10 NOTE — PROGRESS NOTES
Josef Fernando MD. [facility] [place] Patient: Rafa Ellis : 1932 Today's Date: 9/10/2018 CARDIOLOGY PROGRESS NOTE 
S: Feels tired this morning. No CP or SOB. O:  
Physical Exam: 
Visit Vitals  /72 (BP 1 Location: Right arm, BP Patient Position: At rest)  Pulse 99  Temp 99.7 °F (37.6 °C)  Resp 18  Ht 5' 10\" (1.778 m)  Wt 214 lb (97.1 kg)  SpO2 96%  BMI 30.71 kg/m2 Patient ain NAD HEENT:  Hearing intact, non-icteric, normocephalic, atraumatic. Neck Exam: Supple Lung Exam: Clear to auscultation, even breath sounds. Cardiac Exam: Irregularly, irregular with no murmur Abdomen: Soft, mildly-tender Extremities: MAW, No lower extremity edema. Psych: Appropriate affect Neuro - Grossly intact Review of Symptoms: 
Constitutional: Negative for fever HEENT: Negative for vision changes. Respiratory: Negative for productive cough Cardiovascular: Negative for syncope Gastrointestinal: + abd pain when she coughs or turns Genitourinary: Negative for dysuria Skin: Negative for rash Heme: No problems bleeding. Neuro - no speech changes or focal weaknesses Intake/Output Summary (Last 24 hours) at 09/10/18 4763 Last data filed at 09/10/18 0050 Gross per 24 hour Intake              560 ml Output             1100 ml Net             -540 ml  
 
 
 
 
LABS / OTHER STUDIES:  
 
Recent Results (from the past 24 hour(s)) CBC W/O DIFF Collection Time: 09/10/18  1:24 AM  
Result Value Ref Range WBC 9.0 3.6 - 11.0 K/uL  
 RBC 3.85 3.80 - 5.20 M/uL  
 HGB 11.8 11.5 - 16.0 g/dL HCT 38.7 35.0 - 47.0 % .5 (H) 80.0 - 99.0 FL  
 MCH 30.6 26.0 - 34.0 PG  
 MCHC 30.5 30.0 - 36.5 g/dL  
 RDW 13.2 11.5 - 14.5 % PLATELET 346 462 - 979 K/uL MPV 10.0 8.9 - 12.9 FL  
 NRBC 0.0 0  WBC ABSOLUTE NRBC 0.00 0.00 - 0.01 K/uL METABOLIC PANEL, BASIC Collection Time: 09/10/18  1:24 AM  
Result Value Ref Range Sodium 138 136 - 145 mmol/L Potassium 5.3 (H) 3.5 - 5.1 mmol/L Chloride 106 97 - 108 mmol/L  
 CO2 27 21 - 32 mmol/L Anion gap 5 5 - 15 mmol/L Glucose 86 65 - 100 mg/dL BUN 28 (H) 6 - 20 MG/DL Creatinine 1.34 (H) 0.55 - 1.02 MG/DL  
 BUN/Creatinine ratio 21 (H) 12 - 20 GFR est AA 45 (L) >60 ml/min/1.73m2 GFR est non-AA 38 (L) >60 ml/min/1.73m2 Calcium 8.5 8.5 - 10.1 MG/DL MAGNESIUM Collection Time: 09/10/18  1:24 AM  
Result Value Ref Range Magnesium 2.4 1.6 - 2.4 mg/dL Lab Results Component Value Date/Time TSH 0.737 11/16/2017 02:35 PM  
 
 
 
 
 
CARDIAC DIAGNOSTICS:  
 
Cardiac Evaluation Includes: 
 
Echo 7/31/14 - EF 60 % to 65 %.grade 1 diastolic dysfunction, mild TR,  
  
EKG 9/7/18 - NSR, RBBB Telemetry 9/10/18 - atrial flutter, -110 bpm 
 
 
 
 
 
ASSESSMENT AND PLAN:  
 
Assessment and Plan: 
1) Atrial flutter 
- newly diagnosed 9/7/18  
- Will increase PO metoprolol for tighter HR control - [id number] score is 4 -- Eliquis started this admission (I went over risks and benefits with patient and her daughter) - check an echo and TSH 
 
2) Will follow Yeni Corbin MD, [facility] [place] 9 [facility] [address] [facility], [address]  [facility] [address] [facility] [place] [address]  [address] Ph: [phone]   Ph [phone]

## 2018-09-10 NOTE — PROGRESS NOTES
Does not feel well today. Mild abd pain with movement. Gen surgery to see. Breath sounds diminished. Check CXR. Continue IV abx, pending cultures. Not ready for discharge.

## 2018-09-10 NOTE — PROGRESS NOTES
Spiritual Care Partner Volunteer visited patient in 5 Med Surg on 9/10/18. Documented by: Fitz Miranda [address] [place] [address] ([id number])

## 2018-09-11 LAB
ALBUMIN SERPL-MCNC: 2.1 G/DL (ref 3.5–5)
ALBUMIN/GLOB SERPL: 0.6 {RATIO} (ref 1.1–2.2)
ALP SERPL-CCNC: 175 U/L (ref 45–117)
ALT SERPL-CCNC: 22 U/L (ref 12–78)
ANION GAP SERPL CALC-SCNC: 6 MMOL/L (ref 5–15)
AST SERPL-CCNC: 17 U/L (ref 15–37)
BASOPHILS # BLD: 0 K/UL (ref 0–0.1)
BASOPHILS NFR BLD: 0 % (ref 0–1)
BILIRUB SERPL-MCNC: 0.4 MG/DL (ref 0.2–1)
BUN SERPL-MCNC: 20 MG/DL (ref 6–20)
BUN/CREAT SERPL: 19 (ref 12–20)
CALCIUM SERPL-MCNC: 8.9 MG/DL (ref 8.5–10.1)
CHLORIDE SERPL-SCNC: 108 MMOL/L (ref 97–108)
CO2 SERPL-SCNC: 28 MMOL/L (ref 21–32)
CREAT SERPL-MCNC: 1.05 MG/DL (ref 0.55–1.02)
DIFFERENTIAL METHOD BLD: ABNORMAL
EOSINOPHIL # BLD: 0.7 K/UL (ref 0–0.4)
EOSINOPHIL NFR BLD: 9 % (ref 0–7)
ERYTHROCYTE [DISTWIDTH] IN BLOOD BY AUTOMATED COUNT: 13 % (ref 11.5–14.5)
GLOBULIN SER CALC-MCNC: 3.8 G/DL (ref 2–4)
GLUCOSE SERPL-MCNC: 95 MG/DL (ref 65–100)
HCT VFR BLD AUTO: 36.3 % (ref 35–47)
HGB BLD-MCNC: 11 G/DL (ref 11.5–16)
IMM GRANULOCYTES # BLD: 0.1 K/UL (ref 0–0.04)
IMM GRANULOCYTES NFR BLD AUTO: 1 % (ref 0–0.5)
LYMPHOCYTES # BLD: 0.6 K/UL (ref 0.8–3.5)
LYMPHOCYTES NFR BLD: 7 % (ref 12–49)
MAGNESIUM SERPL-MCNC: 2.2 MG/DL (ref 1.6–2.4)
MCH RBC QN AUTO: 30.2 PG (ref 26–34)
MCHC RBC AUTO-ENTMCNC: 30.3 G/DL (ref 30–36.5)
MCV RBC AUTO: 99.7 FL (ref 80–99)
MONOCYTES # BLD: 1.2 K/UL (ref 0–1)
MONOCYTES NFR BLD: 14 % (ref 5–13)
NEUTS SEG # BLD: 5.9 K/UL (ref 1.8–8)
NEUTS SEG NFR BLD: 69 % (ref 32–75)
NRBC # BLD: 0 K/UL (ref 0–0.01)
NRBC BLD-RTO: 0 PER 100 WBC
PHOSPHATE SERPL-MCNC: 2.3 MG/DL (ref 2.6–4.7)
PLATELET # BLD AUTO: 218 K/UL (ref 150–400)
PMV BLD AUTO: 9.9 FL (ref 8.9–12.9)
POTASSIUM SERPL-SCNC: 4.5 MMOL/L (ref 3.5–5.1)
PROT SERPL-MCNC: 5.9 G/DL (ref 6.4–8.2)
RBC # BLD AUTO: 3.64 M/UL (ref 3.8–5.2)
SODIUM SERPL-SCNC: 142 MMOL/L (ref 136–145)
T3FREE SERPL-MCNC: 1.6 PG/ML (ref 2.2–4)
T4 FREE SERPL-MCNC: 1.9 NG/DL (ref 0.8–1.5)
TSH SERPL DL<=0.05 MIU/L-ACNC: 0.05 UIU/ML (ref 0.36–3.74)
WBC # BLD AUTO: 8.5 K/UL (ref 3.6–11)

## 2018-09-11 PROCEDURE — 84100 ASSAY OF PHOSPHORUS: CPT | Performed by: INTERNAL MEDICINE

## 2018-09-11 PROCEDURE — 74011000258 HC RX REV CODE- 258: Performed by: INTERNAL MEDICINE

## 2018-09-11 PROCEDURE — 84481 FREE ASSAY (FT-3): CPT | Performed by: INTERNAL MEDICINE

## 2018-09-11 PROCEDURE — 84443 ASSAY THYROID STIM HORMONE: CPT | Performed by: SPECIALIST

## 2018-09-11 PROCEDURE — 77030038269 HC DRN EXT URIN PURWCK BARD -A

## 2018-09-11 PROCEDURE — 84439 ASSAY OF FREE THYROXINE: CPT | Performed by: INTERNAL MEDICINE

## 2018-09-11 PROCEDURE — 74011250637 HC RX REV CODE- 250/637: Performed by: INTERNAL MEDICINE

## 2018-09-11 PROCEDURE — 36415 COLL VENOUS BLD VENIPUNCTURE: CPT | Performed by: SPECIALIST

## 2018-09-11 PROCEDURE — 85025 COMPLETE CBC W/AUTO DIFF WBC: CPT | Performed by: INTERNAL MEDICINE

## 2018-09-11 PROCEDURE — 83735 ASSAY OF MAGNESIUM: CPT | Performed by: INTERNAL MEDICINE

## 2018-09-11 PROCEDURE — 97116 GAIT TRAINING THERAPY: CPT

## 2018-09-11 PROCEDURE — 74011250636 HC RX REV CODE- 250/636: Performed by: INTERNAL MEDICINE

## 2018-09-11 PROCEDURE — 74011250637 HC RX REV CODE- 250/637: Performed by: SPECIALIST

## 2018-09-11 PROCEDURE — 74011250637 HC RX REV CODE- 250/637: Performed by: PHYSICIAN ASSISTANT

## 2018-09-11 PROCEDURE — 97535 SELF CARE MNGMENT TRAINING: CPT

## 2018-09-11 PROCEDURE — 80053 COMPREHEN METABOLIC PANEL: CPT | Performed by: INTERNAL MEDICINE

## 2018-09-11 PROCEDURE — 65660000000 HC RM CCU STEPDOWN

## 2018-09-11 PROCEDURE — 94760 N-INVAS EAR/PLS OXIMETRY 1: CPT

## 2018-09-11 PROCEDURE — 97530 THERAPEUTIC ACTIVITIES: CPT

## 2018-09-11 RX ORDER — METRONIDAZOLE 250 MG/1
500 TABLET ORAL EVERY 12 HOURS
Status: DISCONTINUED | OUTPATIENT
Start: 2018-09-11 | End: 2018-09-12 | Stop reason: HOSPADM

## 2018-09-11 RX ADMIN — METOPROLOL SUCCINATE 75 MG: 50 TABLET, FILM COATED, EXTENDED RELEASE ORAL at 11:17

## 2018-09-11 RX ADMIN — DOCUSATE SODIUM 100 MG: 100 CAPSULE, LIQUID FILLED ORAL at 11:17

## 2018-09-11 RX ADMIN — VITAMIN D, TAB 1000IU (100/BT) 1000 UNITS: 25 TAB at 11:17

## 2018-09-11 RX ADMIN — Medication 5 ML: at 06:00

## 2018-09-11 RX ADMIN — LEVOTHYROXINE SODIUM 150 MCG: 150 TABLET ORAL at 07:49

## 2018-09-11 RX ADMIN — APIXABAN 5 MG: 5 TABLET, FILM COATED ORAL at 18:39

## 2018-09-11 RX ADMIN — CEFTRIAXONE SODIUM 2 G: 2 INJECTION, POWDER, FOR SOLUTION INTRAMUSCULAR; INTRAVENOUS at 18:43

## 2018-09-11 RX ADMIN — Medication 10 ML: at 14:00

## 2018-09-11 RX ADMIN — Medication 10 ML: at 21:51

## 2018-09-11 RX ADMIN — METRONIDAZOLE 500 MG: 500 INJECTION, SOLUTION INTRAVENOUS at 11:19

## 2018-09-11 RX ADMIN — APIXABAN 5 MG: 5 TABLET, FILM COATED ORAL at 11:17

## 2018-09-11 RX ADMIN — METRONIDAZOLE 500 MG: 250 TABLET ORAL at 21:51

## 2018-09-11 RX ADMIN — CEFEPIME 2 G: 2 INJECTION, POWDER, FOR SOLUTION INTRAVENOUS at 03:41

## 2018-09-11 NOTE — PROGRESS NOTES
Bedside shift change report given to [facility] (oncoming nurse) by Darlyn Chavez (offgoing nurse). Report included the following information SBAR, Kardex, Intake/Output, MAR, Recent Results and Cardiac Rhythm NSR (converted from A-flutter ~4pm).

## 2018-09-11 NOTE — PROGRESS NOTES
Problem: Mobility Impaired (Adult and Pediatric) Goal: *Acute Goals and Plan of Care (Insert Text) Physical Therapy Goals Initiated 9/10/2018 1. Patient will move from supine to sit and sit to supine , scoot up and down and roll side to side in bed with supervision/set-up within 7 day(s). 2.  Patient will transfer from bed to chair and chair to bed with supervision/set-up using the least restrictive device within 7 day(s). 3.  Patient will perform sit to stand with supervision/set-up within 7 day(s). 4.  Patient will ambulate with supervision/set-up for 200 feet with the least restrictive device within 7 day(s). 5.  Patient will ascend/descend 6 stairs with 2 handrail(s) with minimal assistance/contact guard assist within 7 day(s). physical Therapy TREATMENT Patient: Obdulio Dominguez (87 y.o. female) Date: 9/11/2018 Diagnosis: ACUTE CHOLECYSTITIS Cholecystitis <principal problem not specified> Procedure(s) (LRB): 
CHOLECYSTECTOMY LAPAROSCOPIC (N/A) 3 Days Post-Op Precautions: Fall Chart, physical therapy assessment, plan of care and goals were reviewed. ASSESSMENT: 
Pt comes to sit with min assist.Pt sit to stand with min assist of 2. Pt ambulated 40ft with RW min assist.Pt fatigues quickly with mobility. Pt was assisted into restroom before sitting up in chair. Pt weak, deconditioned and would benefit from Rehab before going home. Progression toward goals: 
[]    Improving appropriately and progressing toward goals [x]    Improving slowly and progressing toward goals 
[]    Not making progress toward goals and plan of care will be adjusted PLAN: 
Patient continues to benefit from skilled intervention to address the above impairments. Continue treatment per established plan of care. Discharge Recommendations: In patient Rehab Further Equipment Recommendations for Discharge:  rolling walker SUBJECTIVE:  
 
 
OBJECTIVE DATA SUMMARY:  
Critical Behavior: 
Neurologic State: Alert Orientation Level: Oriented X4 Cognition: Appropriate decision making Safety/Judgement: Awareness of environment Functional Mobility Training: 
Bed Mobility: 
  
Supine to Sit: Minimum assistance Transfers: 
Sit to Stand: Minimum assistance;Assist x2 Stand to Sit: Minimum assistance;Contact guard assistance Bed to Chair: Minimum assistance Balance: 
Sitting: Intact Standing: Intact; With support Ambulation/Gait Training: 
Distance (ft): 40 Feet (ft) Assistive Device: Walker, rolling;Gait belt Ambulation - Level of Assistance: Minimal assistance Gait Abnormalities: Path deviations Base of Support: Narrowed Step Length: Left shortened;Right shortened Pain: 
Pain Scale 1: Numeric (0 - 10) Pain Intensity 1: 0 Activity Tolerance:  
Pt tolerated treatment fairly well. Please refer to the flowsheet for vital signs taken during this treatment. After treatment:  
[x]    Patient left in no apparent distress sitting up in chair 
[]    Patient left in no apparent distress in bed 
[]    Call bell left within reach 
[]    Nursing notified 
[]    Caregiver present 
[]    Bed alarm activated COMMUNICATION/COLLABORATION:  
The patients plan of care was discussed with: Physical Therapist 
 
Severiano Ip, PTA Time Calculation: 38 mins

## 2018-09-11 NOTE — PROGRESS NOTES
Problem: Self Care Deficits Care Plan (Adult) Goal: *Acute Goals and Plan of Care (Insert Text) Occupational Therapy Goals Initiated 9/10/2018 1. Patient will perform lower body dressing, using AE PRN, with supervision/set-up within 7 day(s). 2.  Patient will perform upper body dressing with supervision/set-up within 7 day(s). 3.  Patient will perform grooming, standing at sink, with supervision/set-up within 7 day(s). 4.  Patient will perform toilet transfers with supervision/set-up within 7 day(s). 5.  Patient will perform all aspects of toileting with minimal assistance/contact guard assist within 7 day(s). 6.  Patient will participate in upper extremity therapeutic exercise/activities with supervision/set-up for 10 minutes within 7 day(s). Occupational Therapy TREATMENT Patient: Breanna Downs (89 y.o. female) Date: 9/11/2018 Diagnosis: ACUTE CHOLECYSTITIS Cholecystitis <principal problem not specified> Procedure(s) (LRB): 
CHOLECYSTECTOMY LAPAROSCOPIC (N/A) 3 Days Post-Op Precautions: Fall Chart, occupational therapy assessment, plan of care, and goals were reviewed. ASSESSMENT: 
Pt received supine in bed. Daugther present. Pt motivated to participate. Pt seen in OT for toileting. Bed mobility at min assist with HOB elevated. Sit to stand at min assist. O2 levels on RA at 94%. After ambulating to bathroom at min assist level using RW, de-stated to 88%. Pt placed back on 2L, recovered to 94%. Overall, feel pt is a excellent rehab candidate 2/2 independent prior to admission. Will con't to follow and address listed goals. Progression toward goals: 
[x]       Improving appropriately and progressing toward goals 
[]       Improving slowly and progressing toward goals 
[]       Not making progress toward goals and plan of care will be adjusted PLAN: 
Patient continues to benefit from skilled intervention to address the above impairments. Continue treatment per established plan of care. Discharge Recommendations:  Inpatient Rehab Further Equipment Recommendations for Discharge:  TBD SUBJECTIVE:  
Patient stated I haven't been up yet today.  OBJECTIVE DATA SUMMARY:  
Cognitive/Behavioral Status: 
Neurologic State: Alert Orientation Level: Oriented X4 Cognition: Appropriate decision making Functional Mobility and Transfers for ADLs: 
Bed Mobility: 
Supine to Sit: Minimum assistance Transfers: 
Sit to Stand: Minimum assistance Functional Transfers Bathroom Mobility: Minimum assistance Toilet Transfer : Minimum assistance Bed to Chair: Minimum assistance Balance: 
Sitting: Intact Standing: Impaired; With support ADL Intervention: 
  
 
Grooming Washing Hands: Supervision/set-up Lower Body Dressing Assistance Slip on Shoes Without Back: Minimum assistance Position Performed: Seated edge of bed Toileting Toileting Assistance: Minimum assistance Bowel Hygiene: Moderate assistance Adaptive Equipment: Grab bars Pain: 
Pain Scale 1: Numeric (0 - 10) Pain Intensity 1: 0 Activity Tolerance:  
Good Please refer to the flowsheet for vital signs taken during this treatment. After treatment:  
[x] Patient left in no apparent distress sitting up in chair 
[] Patient left in no apparent distress in bed 
[x] Call bell left within reach 
[] Nursing notified 
[x] Caregiver present [x] Chair alarm activated COMMUNICATION/COLLABORATION:  
The patients plan of care was discussed with: Physical Therapy Assistant and Registered Nurse Tanika Cardona OTR/L Time Calculation: 25 mins

## 2018-09-11 NOTE — PROGRESS NOTES
General Surgery Daily Progress Note Patient: Vianey Zamudio MRN: [id number]  SSN: [id number] YOB: 1932  Age: 80 y.o. Sex: female Admit Date: 9/7/2018 Subjective:  
Feeling OK. Poor appetite but no N/V. + BM and flatus. Has cough and is on O2 Current Facility-Administered Medications Medication Dose Route Frequency  metoprolol succinate (TOPROL-XL) XL tablet 75 mg  75 mg Oral DAILY  traMADol (ULTRAM) tablet 50 mg  50 mg Oral Q6H PRN  
 docusate sodium (COLACE) capsule 100 mg  100 mg Oral BID  
 apixaban (ELIQUIS) tablet 5 mg  5 mg Oral BID  cefepime (MAXIPIME) 2 g in 0.9% sodium chloride (MBP/ADV) 100 mL  2 g IntraVENous Q12H  
 metroNIDAZOLE (FLAGYL) IVPB premix 500 mg  500 mg IntraVENous Q12H  levothyroxine (SYNTHROID) tablet 150 mcg  150 mcg Oral Once per day on Mon Tue Wed Thu Fri Sat  levothyroxine (SYNTHROID) tablet 75 mcg  75 mcg Oral every Sunday  cholecalciferol (VITAMIN D3) tablet 1,000 Units  1,000 Units Oral DAILY  sodium chloride (NS) flush 5-10 mL  5-10 mL IntraVENous Q8H  
 sodium chloride (NS) flush 5-10 mL  5-10 mL IntraVENous PRN  
 naloxone (NARCAN) injection 0.4 mg  0.4 mg IntraVENous PRN  
 acetaminophen (TYLENOL) tablet 650 mg  650 mg Oral Q6H PRN  
 morphine injection 2 mg  2 mg IntraVENous Q4H PRN  
 ondansetron (ZOFRAN) injection 4 mg  4 mg IntraVENous Q4H PRN Objective:  
  
09/09 1901 - 09/11 0700 In: 480 [P.O.:480] Out: Darroll Limb [[id number]] Patient Vitals for the past 8 hrs: 
 BP Temp Pulse Resp SpO2  
09/11/18 0801 121/58 97.6 °F (36.4 °C) (!) 108 18 96 % 09/11/18 0704 - - (!) 107 - -  
09/11/18 5183 119/73 98.5 °F (36.9 °C) 94 17 96 % Physical Exam: 
General: Alert, cooperative, NAD Lungs: Unlabored Heart:  Regular rate and  rhythm Abdomen: Soft, appropriately tender, non distended. + bowel sounds. Incisions c/d/i. Extremities: Warm, moves all, no edema Skin:  Warm and dry, no rash Labs:  
Recent Labs 09/11/18 
 0350 WBC  8.5 HGB  11.0*  
HCT  36.3 PLT  218 Recent Labs  
   09/11/18 
 0350 NA  142  
K  4.5  
CL  108 CO2  28  
GLU  95 BUN  20  
CREA  1.05* CA  8.9 MG  2.2 PHOS  2.3* ALB  2.1* TBILI  0.4 SGOT  17 ALT  22 Assessment / Plan:  
· POD#2 lap anam · Exam appropriate · Diet as tolerated · PO pain meds · Medical management per hospitalist 
· D/c to rehab once medically stable

## 2018-09-11 NOTE — PROGRESS NOTES
Raquel Childs MD. [facility] Patient: Leobardo Knutson : 1932 Today's Date: 2018 CARDIOLOGY PROGRESS NOTE 
S: Slept better last night. No CP or SOB. O:  
 
 
PHYSICAL EXAM:  
 
Physical Exam: 
Visit Vitals  /58 (BP 1 Location: Left arm, BP Patient Position: At rest)  Pulse (!) 108  Temp 97.6 °F (36.4 °C)  Resp 18  Ht 5' 10\" (1.778 m)  Wt 214 lb (97.1 kg)  SpO2 96%  BMI 30.71 kg/m2 Patient appears generally well, mood and affect are appropriate and pleasant. HEENT:  Hearing intact, non-icteric, normocephalic, atraumatic. Neck Exam: Supple Lung Exam: Clear to auscultation, even breath sounds. Cardiac Exam: Irregularly, irregular with no murmur Abdomen: Soft, non-tender obese Extremities: MAW, No lower extremity edema. Psych: Appropriate affect Neuro - Grossly intact Review of Symptoms: 
Constitutional: Negative for fever HEENT: Negative for vision changes. Respiratory: Negative for productive cough Cardiovascular: Negative for syncope Gastrointestinal: + abd pain with cough Genitourinary: Negative for dysuria Skin: Negative for rash Heme: No problems bleeding. Neuro - no speech changes or focal weaknesses LABS / OTHER STUDIES:  
 
Recent Results (from the past 24 hour(s)) TSH 3RD GENERATION Collection Time: 18  3:50 AM  
Result Value Ref Range TSH 0.05 (L) 0.36 - 3.74 uIU/mL CBC WITH AUTOMATED DIFF Collection Time: 18  3:50 AM  
Result Value Ref Range WBC 8.5 3.6 - 11.0 K/uL  
 RBC 3.64 (L) 3.80 - 5.20 M/uL  
 HGB 11.0 (L) 11.5 - 16.0 g/dL HCT 36.3 35.0 - 47.0 % MCV 99.7 (H) 80.0 - 99.0 FL  
 MCH 30.2 26.0 - 34.0 PG  
 MCHC 30.3 30.0 - 36.5 g/dL  
 RDW 13.0 11.5 - 14.5 % PLATELET 463 323 - 685 K/uL MPV 9.9 8.9 - 12.9 FL  
 NRBC 0.0 0  WBC ABSOLUTE NRBC 0.00 0.00 - 0.01 K/uL NEUTROPHILS 69 32 - 75 % LYMPHOCYTES 7 (L) 12 - 49 % MONOCYTES 14 (H) 5 - 13 % EOSINOPHILS 9 (H) 0 - 7 % BASOPHILS 0 0 - 1 % IMMATURE GRANULOCYTES 1 (H) 0.0 - 0.5 % ABS. NEUTROPHILS 5.9 1.8 - 8.0 K/UL  
 ABS. LYMPHOCYTES 0.6 (L) 0.8 - 3.5 K/UL  
 ABS. MONOCYTES 1.2 (H) 0.0 - 1.0 K/UL  
 ABS. EOSINOPHILS 0.7 (H) 0.0 - 0.4 K/UL  
 ABS. BASOPHILS 0.0 0.0 - 0.1 K/UL  
 ABS. IMM. GRANS. 0.1 (H) 0.00 - 0.04 K/UL  
 DF AUTOMATED METABOLIC PANEL, COMPREHENSIVE Collection Time: 09/11/18  3:50 AM  
Result Value Ref Range Sodium 142 136 - 145 mmol/L Potassium 4.5 3.5 - 5.1 mmol/L Chloride 108 97 - 108 mmol/L  
 CO2 28 21 - 32 mmol/L Anion gap 6 5 - 15 mmol/L Glucose 95 65 - 100 mg/dL BUN 20 6 - 20 MG/DL Creatinine 1.05 (H) 0.55 - 1.02 MG/DL  
 BUN/Creatinine ratio 19 12 - 20 GFR est AA >60 >60 ml/min/1.73m2 GFR est non-AA 50 (L) >60 ml/min/1.73m2 Calcium 8.9 8.5 - 10.1 MG/DL Bilirubin, total 0.4 0.2 - 1.0 MG/DL  
 ALT (SGPT) 22 12 - 78 U/L  
 AST (SGOT) 17 15 - 37 U/L Alk. phosphatase 175 (H) 45 - 117 U/L Protein, total 5.9 (L) 6.4 - 8.2 g/dL Albumin 2.1 (L) 3.5 - 5.0 g/dL Globulin 3.8 2.0 - 4.0 g/dL A-G Ratio 0.6 (L) 1.1 - 2.2 MAGNESIUM Collection Time: 09/11/18  3:50 AM  
Result Value Ref Range Magnesium 2.2 1.6 - 2.4 mg/dL PHOSPHORUS Collection Time: 09/11/18  3:50 AM  
Result Value Ref Range Phosphorus 2.3 (L) 2.6 - 4.7 MG/DL  
 
 
 
 
 
ASSESSMENT AND PLAN:  
 
Assessment and Plan: 
1) Atrial flutter 
- newly diagnosed 9/7/18  
- Will increase PO metoprolol for tighter HR control (she declined dose yesterday but will take it today) - [id number] score is 4 -- Eliquis started this admission (I went over risks and benefits with patient and her daughter)   
2) I will sign off and see her back as outpatient. Please call if questions. Office will arrange FU appt.  
  
 
 
Gray Jackson MD, [facility] - [address], [address]  [facility] [address] [facility] [address]  [address] Ph: [phone]    [phone]

## 2018-09-11 NOTE — PROGRESS NOTES
Agustin Singer [facility] 79 
5[address] [facility], [address] [place] [address] 
[phone] Medical Progress Note NAME: Traci Laureano :  1932 MRM:  [id number] Date/Time: 2018 Subjective: Chief Complaint:  Patient was seen and examined by me. Chart reviewed. C/o cough, mild dyspnea Objective:  
 
 
Vitals:  
 
 
Last 24hrs VS reviewed since prior progress note. Most recent are: 
 
Visit Vitals  /56 (BP 1 Location: Left arm, BP Patient Position: At rest)  Pulse 92  Temp 98 °F (36.7 °C)  Resp 18  Ht 5' 10\" (1.778 m)  Wt 97.1 kg (214 lb)  SpO2 96%  BMI 30.71 kg/m2 SpO2 Readings from Last 6 Encounters:  
18 96% 18 98% 17 95% 17 92% 17 96% 16 94% O2 Flow Rate (L/min): 2 l/min Intake/Output Summary (Last 24 hours) at 18 1445 Last data filed at 18 9307 Gross per 24 hour Intake              480 ml Output             1550 ml Net            -1070 ml Exam:  
 
Physical Exam: 
 
Gen:  Elderly, frail, mild distress HEENT:  Pink conjunctivae, PERRL, hearing intact to voice, moist mucous membranes Neck:  Supple, without masses, thyroid non-tender Resp:  No accessory muscle use, crackles on L side Card:  No murmurs, normal S1, S2 without thrills, bruits or peripheral edema Abd:  Soft, non-tender, non-distended, normoactive bowel sounds are present Musc:  No cyanosis or clubbing Skin:  No rashes Neuro:  Cranial nerves 3-12 are grossly intact, follows commands appropriately Psych:  fair insight, oriented to person, place and time, alert Medications Reviewed: (see below) Lab Data Reviewed: (see below) 
 
______________________________________________________________________ Medications:  
 
Current Facility-Administered Medications Medication Dose Route Frequency  metoprolol succinate (TOPROL-XL) XL tablet 75 mg  75 mg Oral DAILY  traMADol (ULTRAM) tablet 50 mg  50 mg Oral Q6H PRN  
 docusate sodium (COLACE) capsule 100 mg  100 mg Oral BID  
 apixaban (ELIQUIS) tablet 5 mg  5 mg Oral BID  cefepime (MAXIPIME) 2 g in 0.9% sodium chloride (MBP/ADV) 100 mL  2 g IntraVENous Q12H  
 metroNIDAZOLE (FLAGYL) IVPB premix 500 mg  500 mg IntraVENous Q12H  levothyroxine (SYNTHROID) tablet 150 mcg  150 mcg Oral Once per day on Mon Tue Wed Thu Fri Sat  levothyroxine (SYNTHROID) tablet 75 mcg  75 mcg Oral every Sunday  cholecalciferol (VITAMIN D3) tablet 1,000 Units  1,000 Units Oral DAILY  sodium chloride (NS) flush 5-10 mL  5-10 mL IntraVENous Q8H  
 sodium chloride (NS) flush 5-10 mL  5-10 mL IntraVENous PRN  
 naloxone (NARCAN) injection 0.4 mg  0.4 mg IntraVENous PRN  
 acetaminophen (TYLENOL) tablet 650 mg  650 mg Oral Q6H PRN  
 morphine injection 2 mg  2 mg IntraVENous Q4H PRN  
 ondansetron (ZOFRAN) injection 4 mg  4 mg IntraVENous Q4H PRN Lab Review:  
 
Recent Labs  
   09/11/18 
 0350  09/10/18 
 0124  09/09/18 
 1633 WBC  8.5  9.0  12.6* HGB  11.0*  11.8  11.3* HCT  36.3  38.7  37.4 PLT  218  227  174 Recent Labs  
   09/11/18 
 0350  09/10/18 
 0124  09/09/18 
 4886 NA  142  138  140  
K  4.5  5.3*  4.9  
CL  108  106  107 CO2  28  27  27 GLU  95  86  112* BUN  20  28*  24* CREA  1.05*  1.34*  1.29* CA  8.9  8.5  8.4* MG  2.2  2.4  2.3 PHOS  2.3*   --   3.8 ALB  2.1*   --   2.2* TBILI  0.4   --   0.4 SGOT  17   --   26 ALT  22   --   25 Lab Results Component Value Date/Time Glucose (POC) 159 (H) 05/11/2012 09:58 PM  
 Glucose (POC) 116 (H) 04/29/2012 06:23 PM  
 
 
  
Assessment / Plan: Active Problems: 
 
79 yo hx of diastolic CHF, CKD 3, hypothyroid, presented w/ cholecystitis, empyema of gallbladder, s/p lap anam 09/08. Complicated by bacteremia, aflutter. Medicine is following as a consultant 1) Acute cholecystitis/empyema of gallbladder/bacteremia: s/p lap anam 09/08. Blood cx w/ E. Coli. Cont IV Cefepime/flagyl. Will consult ID for further recs. Rest of management per surgical team 
 
2) Dyspnea/atelectasis: use nebs prn. Encourage incentive spirometry 3) New atrial flutter: now rate controlled. Cont metoprolol, eliquis. Cards following 4) Chronic dCHF: cont BB, use diuretics prn 
 
5) CKD 3: Cr around baseline. Will monitor 6) Hypothyroid: cont synthroid. TSH was 0.05, but free T4 1.9, free T3 1.6. Defer management to outpatient primary care 7) Weakness: cont PT/OT. Will need SNF Total time spent with patient: 45 Minutes Care Plan discussed with: Patient, family, nursing Discussed:  Care Plan Prophylaxis:  eliquis Disposition:  per team 
        
___________________________________________________ Attending Physician: Kathryn Huang MD

## 2018-09-11 NOTE — PROGRESS NOTES
Post Discharge PICC and Antibiotic Orders 1. Diagnosis: cholecystitis/bacteremia 2. Antibiotic:   Ceftriaxone 2gm IV daily through 9/21/18 Metronidazole 500mg po q 12 hours through 9/21/18 3. Routine PICC/ Kush/ Portacath Care including PRN catheter flow management 4. Weekly labs: 
 _x__CBC/diff/platelets _x_BUN/Creatinine 
 ___CPK 
 _x_AST/TotalBilirubin/AlkalinePhosphatase 
 ___CRP 
 ___Gentamicin level  ___gentamicin peak/trough Trough Vancomycin level ____goal 10-15 ___goal 15-20 5. Fax lab to [phone]  Call Critical Lab Values to [phone] 6. May send to IR for line evaluation or replacement [phone] 7. Home Health to pull PIC line at end of therapy or send to IR for Kush removal 
8. Allergies: Allergies Allergen Reactions  Ciprofloxacin (Bulk) Rash  Diltiazem Rash  Piperacillin Other (comments) Appears to have tolerated 5/13/12-5/23/12  
 
9. Pharmacy Consult for Vancomycin dosing/gentamicin dosing.  
 
 
Guy Martin DO

## 2018-09-11 NOTE — CONSULTS
[address] Izzy Amaya  MR#: [id number]  : 1932  ACCOUNT #: [de-identified]   DATE OF SERVICE: 2018    REQUESTING PHYSICIAN:  Dr. Salma Lipscomb:  Abdominal pain. HISTORY OF PRESENT ILLNESS:  Patient is an 51-year-old female with past medical history significant for atrial fibrillation on chronic anticoagulation, COPD and gastroesophageal reflux disease who was admitted to [facility] on  with the aforementioned complaint. Workup revealed cholecystitis and she was taken to the OR on  where she underwent laparoscopic cholecystectomy. Intraoperatively, she was found to have an empyema involving the gallbladder. Cultures grew Klebsiella and Escherichia coli. Of note, blood cultures have returned growing Escherichia coli and Klebsiella is growing from the urine. She is currently on cefepime and metronidazole. Discharge planning is underway. The infectious diseases service has been asked to assist with antibiotic management. PAST MEDICAL HISTORY:  Anemia, COPD, diastolic dysfunction, gastroesophageal reflux disease, glaucoma, urinary incontinence, osteoarthritis, hypothyroidism, pulmonary nodules. PAST SURGICAL HISTORY:  Cataract removal, hysterectomy, tonsillectomy. FAMILY HISTORY:  Breast cancer, heart disease. SOCIAL HISTORY:  No alcohol, tobacco or illicit drug use. OUTPATIENT MEDICATIONS:  Please see the body of chart for details. She was not on antibiotics prior to admission. REVIEW OF SYSTEMS:  As per the HPI. Remainder 10 system review of systems is unremarkable. LABORATORY DATA:  White blood cell count is 8500, white count was 19,000 at the time of admission, platelet count 943,845. Urinalysis positive for leukocyte esterase with 20-50 white cells per high power field. Creatinine is 1.05.   Blood cultures are growing Escherichia coli 1/4 bottles from September 7th.  Urine culture is growing a pansensitive Klebsiella pneumoniae from September 7th. Cultures taken from the gallbladder are growing E. coli and Klebsiella pneumoniae. Anaerobic culture is sterile. Repeat blood cultures are sterile. PHYSICAL EXAMINATION:  VITAL SIGNS:  Temperature 98 degrees Fahrenheit, maximum temperature is less than 100, heart rate 92 beats per minute, blood pressure 134/56, oxygen saturation 96% on 2 liters nasal cannula. GENERAL:  Alert, in no acute distress. HEENT:  Normocephalic, atraumatic. Mucous membranes moist.  NECK:  Supple. CARDIOVASCULAR:  Regular rate and rhythm. No murmurs, gallops or rubs. LUNGS:  Clear to auscultation anteriorly. ABDOMEN:  Mildly tender to palpation. No guarding, no rebound. EXTREMITIES:  No clubbing, cyanosis or edema. SKIN:  No rashes. ASSESSMENT AND PLAN:  1. Cholecystitis with empyema of the gallbladder. The patient is status post laparoscopic cholecystectomy on September 8th. Klebsiella and Escherichia coli are growing from the culture taken from the gallbladder. Of note Escherichia coli is growing in the blood as well. She is currently on cefepime and metronidazole. Antibiotics will be narrowed to ceftriaxone and oral metronidazole. She will complete a 14-day course of antibiotic therapy for the bacteremia, which will cover the cholecystitis as well. 2.  Escherichia coli bacteremia. Cefepime will be narrowed to ceftriaxone. She will need a Port-A-Cath due to chronic kidney disease. Plan to continue ceftriaxone through 09/21/2018. 3.  Klebsiella pneumoniae urinary tract infection. Continue antibiotics as above. 4.  Chronic kidney disease. The patient will need a Kush catheter at discharge. Thank you for allowing me to participate in the care of this patient.       DO VIRAL Prather / [place]  D: 09/11/2018 15:19     T: 09/11/2018 15:36  JOB #: [id number]

## 2018-09-12 ENCOUNTER — APPOINTMENT (OUTPATIENT)
Dept: INTERVENTIONAL RADIOLOGY/VASCULAR | Age: 83
DRG: 418 | End: 2018-09-12
Attending: INTERNAL MEDICINE
Payer: MEDICARE

## 2018-09-12 VITALS
DIASTOLIC BLOOD PRESSURE: 61 MMHG | BODY MASS INDEX: 30.64 KG/M2 | HEART RATE: 77 BPM | OXYGEN SATURATION: 92 % | SYSTOLIC BLOOD PRESSURE: 121 MMHG | RESPIRATION RATE: 31 BRPM | HEIGHT: 70 IN | TEMPERATURE: 98.4 F | WEIGHT: 214 LBS

## 2018-09-12 LAB
ANION GAP SERPL CALC-SCNC: 7 MMOL/L (ref 5–15)
BUN SERPL-MCNC: 15 MG/DL (ref 6–20)
BUN/CREAT SERPL: 16 (ref 12–20)
CALCIUM SERPL-MCNC: 9 MG/DL (ref 8.5–10.1)
CHLORIDE SERPL-SCNC: 107 MMOL/L (ref 97–108)
CO2 SERPL-SCNC: 28 MMOL/L (ref 21–32)
CREAT SERPL-MCNC: 0.92 MG/DL (ref 0.55–1.02)
GLUCOSE SERPL-MCNC: 97 MG/DL (ref 65–100)
MAGNESIUM SERPL-MCNC: 2.2 MG/DL (ref 1.6–2.4)
PHOSPHATE SERPL-MCNC: 2 MG/DL (ref 2.6–4.7)
POTASSIUM SERPL-SCNC: 4.4 MMOL/L (ref 3.5–5.1)
SODIUM SERPL-SCNC: 142 MMOL/L (ref 136–145)

## 2018-09-12 PROCEDURE — 84100 ASSAY OF PHOSPHORUS: CPT | Performed by: INTERNAL MEDICINE

## 2018-09-12 PROCEDURE — 77030013140 HC MSK NEB VYRM -A

## 2018-09-12 PROCEDURE — 74011250636 HC RX REV CODE- 250/636: Performed by: RADIOLOGY

## 2018-09-12 PROCEDURE — 74011250637 HC RX REV CODE- 250/637: Performed by: INTERNAL MEDICINE

## 2018-09-12 PROCEDURE — 77030018719 HC DRSG PTCH ANTIMIC J&J -A

## 2018-09-12 PROCEDURE — 77001 FLUOROGUIDE FOR VEIN DEVICE: CPT

## 2018-09-12 PROCEDURE — 36415 COLL VENOUS BLD VENIPUNCTURE: CPT | Performed by: INTERNAL MEDICINE

## 2018-09-12 PROCEDURE — 02HV33Z INSERTION OF INFUSION DEVICE INTO SUPERIOR VENA CAVA, PERCUTANEOUS APPROACH: ICD-10-PCS | Performed by: RADIOLOGY

## 2018-09-12 PROCEDURE — 80048 BASIC METABOLIC PNL TOTAL CA: CPT | Performed by: INTERNAL MEDICINE

## 2018-09-12 PROCEDURE — 94640 AIRWAY INHALATION TREATMENT: CPT

## 2018-09-12 PROCEDURE — C1769 GUIDE WIRE: HCPCS

## 2018-09-12 PROCEDURE — 77030038269 HC DRN EXT URIN PURWCK BARD -A

## 2018-09-12 PROCEDURE — C1751 CATH, INF, PER/CENT/MIDLINE: HCPCS

## 2018-09-12 PROCEDURE — 94760 N-INVAS EAR/PLS OXIMETRY 1: CPT

## 2018-09-12 PROCEDURE — 74011000250 HC RX REV CODE- 250: Performed by: INTERNAL MEDICINE

## 2018-09-12 PROCEDURE — 83735 ASSAY OF MAGNESIUM: CPT | Performed by: INTERNAL MEDICINE

## 2018-09-12 PROCEDURE — 77030039266 HC ADH SKN EXOFIN S2SG -A

## 2018-09-12 PROCEDURE — 74011250637 HC RX REV CODE- 250/637: Performed by: SPECIALIST

## 2018-09-12 PROCEDURE — C1894 INTRO/SHEATH, NON-LASER: HCPCS

## 2018-09-12 RX ORDER — FENTANYL CITRATE 50 UG/ML
25-200 INJECTION, SOLUTION INTRAMUSCULAR; INTRAVENOUS
Status: DISCONTINUED | OUTPATIENT
Start: 2018-09-12 | End: 2018-09-12 | Stop reason: HOSPADM

## 2018-09-12 RX ORDER — IPRATROPIUM BROMIDE AND ALBUTEROL SULFATE 2.5; .5 MG/3ML; MG/3ML
3 SOLUTION RESPIRATORY (INHALATION)
Status: COMPLETED | OUTPATIENT
Start: 2018-09-12 | End: 2018-09-12

## 2018-09-12 RX ORDER — MIDAZOLAM HYDROCHLORIDE 1 MG/ML
.5-1 INJECTION, SOLUTION INTRAMUSCULAR; INTRAVENOUS
Status: DISCONTINUED | OUTPATIENT
Start: 2018-09-12 | End: 2018-09-12 | Stop reason: HOSPADM

## 2018-09-12 RX ORDER — IPRATROPIUM BROMIDE AND ALBUTEROL SULFATE 2.5; .5 MG/3ML; MG/3ML
3 SOLUTION RESPIRATORY (INHALATION)
Status: DISCONTINUED | OUTPATIENT
Start: 2018-09-12 | End: 2018-09-12 | Stop reason: HOSPADM

## 2018-09-12 RX ORDER — METRONIDAZOLE 500 MG/1
500 TABLET ORAL EVERY 12 HOURS
Qty: 18 TAB | Refills: 0 | Status: SHIPPED
Start: 2018-09-12 | End: 2018-09-21

## 2018-09-12 RX ORDER — LIDOCAINE HYDROCHLORIDE 10 MG/ML
20 INJECTION INFILTRATION; PERINEURAL
Status: DISCONTINUED | OUTPATIENT
Start: 2018-09-12 | End: 2018-09-12 | Stop reason: HOSPADM

## 2018-09-12 RX ORDER — HEPARIN SODIUM 200 [USP'U]/100ML
500 INJECTION, SOLUTION INTRAVENOUS ONCE
Status: COMPLETED | OUTPATIENT
Start: 2018-09-12 | End: 2018-09-12

## 2018-09-12 RX ORDER — TRAMADOL HYDROCHLORIDE 50 MG/1
50 TABLET ORAL
Qty: 30 TAB | Refills: 0 | Status: SHIPPED | OUTPATIENT
Start: 2018-09-12 | End: 2018-10-01

## 2018-09-12 RX ORDER — SODIUM,POTASSIUM PHOSPHATES 280-250MG
2 POWDER IN PACKET (EA) ORAL ONCE
Status: DISCONTINUED | OUTPATIENT
Start: 2018-09-12 | End: 2018-09-12 | Stop reason: HOSPADM

## 2018-09-12 RX ADMIN — MIDAZOLAM 1 MG: 1 INJECTION INTRAMUSCULAR; INTRAVENOUS at 13:32

## 2018-09-12 RX ADMIN — IPRATROPIUM BROMIDE AND ALBUTEROL SULFATE 3 ML: .5; 3 SOLUTION RESPIRATORY (INHALATION) at 15:09

## 2018-09-12 RX ADMIN — METRONIDAZOLE 500 MG: 250 TABLET ORAL at 09:30

## 2018-09-12 RX ADMIN — FENTANYL CITRATE 50 MCG: 50 INJECTION, SOLUTION INTRAMUSCULAR; INTRAVENOUS at 13:37

## 2018-09-12 RX ADMIN — METOPROLOL SUCCINATE 75 MG: 50 TABLET, FILM COATED, EXTENDED RELEASE ORAL at 09:30

## 2018-09-12 RX ADMIN — LEVOTHYROXINE SODIUM 150 MCG: 150 TABLET ORAL at 09:37

## 2018-09-12 RX ADMIN — MIDAZOLAM 1 MG: 1 INJECTION INTRAMUSCULAR; INTRAVENOUS at 13:27

## 2018-09-12 RX ADMIN — LIDOCAINE HYDROCHLORIDE 20 ML: 10 INJECTION, SOLUTION INFILTRATION; PERINEURAL at 13:28

## 2018-09-12 RX ADMIN — HEPARIN SODIUM IN SODIUM CHLORIDE 1000 UNITS: 200 INJECTION INTRAVENOUS at 12:55

## 2018-09-12 RX ADMIN — FENTANYL CITRATE 25 MCG: 50 INJECTION, SOLUTION INTRAMUSCULAR; INTRAVENOUS at 13:10

## 2018-09-12 RX ADMIN — Medication 10 ML: at 06:00

## 2018-09-12 NOTE — PROGRESS NOTES
5:42pm  Attempted to call report to [facility], [phone]. Held 10 minutes for nurse, then was disconnected. Will try again. 6:40pm  Called again, spoke to Mary to give report. Also provided our unit # in case of further questions. Opportunity for questions also offered.

## 2018-09-12 NOTE — PROGRESS NOTES
Agustin Singer [facility] 79 
5[address] [facility], [address] [place] [address] 
[phone] Medical Progress Note NAME: Damaris Hare :  1932 MRM:  [id number] Date/Time: 2018 Subjective: Chief Complaint:  Patient was seen and examined by me. Chart reviewed. Awaiting Kush catheter placement. No chest pain, SOB Objective:  
 
 
Vitals:  
 
 
Last 24hrs VS reviewed since prior progress note. Most recent are: 
 
Visit Vitals  /56  Pulse 73  Temp 98.4 °F (36.9 °C)  Resp 27  
 Ht 5' 10\" (1.778 m)  Wt 97.1 kg (214 lb)  SpO2 90%  BMI 30.71 kg/m2 SpO2 Readings from Last 6 Encounters:  
18 90% 18 98% 17 95% 17 92% 17 96% 16 94% O2 Flow Rate (L/min): 2 l/min Intake/Output Summary (Last 24 hours) at 18 1450 Last data filed at 18 2308 Gross per 24 hour Intake                0 ml Output             1100 ml Net            -1100 ml Exam:  
 
Physical Exam: 
 
Gen:  Elderly, frail, NAD HEENT:  Pink conjunctivae, PERRL, hearing intact to voice, moist mucous membranes Neck:  Supple, without masses, thyroid non-tender Resp:  No accessory muscle use, CTAB Card:  No murmurs, normal S1, S2 without thrills, bruits or peripheral edema Abd:  Soft, non-tender, non-distended, normoactive bowel sounds are present Musc:  No cyanosis or clubbing Skin:  No rashes Neuro:  Cranial nerves 3-12 are grossly intact, follows commands appropriately Psych:  fair insight, oriented to person, place and time, alert Medications Reviewed: (see below) Lab Data Reviewed: (see below) 
 
______________________________________________________________________ Medications:  
 
Current Facility-Administered Medications Medication Dose Route Frequency  potassium, sodium phosphates (NEUTRA-PHOS) packet 2 Packet  2 Packet Oral ONCE  
  albuterol-ipratropium (DUO-NEB) 2.5 MG-0.5 MG/3 ML  3 mL Nebulization NOW  albuterol-ipratropium (DUO-NEB) 2.5 MG-0.5 MG/3 ML  3 mL Nebulization Q6H PRN  
 cefTRIAXone (ROCEPHIN) 2 g in 0.9% sodium chloride (MBP/ADV) 50 mL  2 g IntraVENous Q24H  
 metroNIDAZOLE (FLAGYL) tablet 500 mg  500 mg Oral Q12H  
 metoprolol succinate (TOPROL-XL) XL tablet 75 mg  75 mg Oral DAILY  traMADol (ULTRAM) tablet 50 mg  50 mg Oral Q6H PRN  
 docusate sodium (COLACE) capsule 100 mg  100 mg Oral BID  
 apixaban (ELIQUIS) tablet 5 mg  5 mg Oral BID  levothyroxine (SYNTHROID) tablet 150 mcg  150 mcg Oral Once per day on Mon Tue Wed Thu Fri Sat  levothyroxine (SYNTHROID) tablet 75 mcg  75 mcg Oral every Sunday  cholecalciferol (VITAMIN D3) tablet 1,000 Units  1,000 Units Oral DAILY  sodium chloride (NS) flush 5-10 mL  5-10 mL IntraVENous Q8H  
 sodium chloride (NS) flush 5-10 mL  5-10 mL IntraVENous PRN  
 naloxone (NARCAN) injection 0.4 mg  0.4 mg IntraVENous PRN  
 acetaminophen (TYLENOL) tablet 650 mg  650 mg Oral Q6H PRN  
 morphine injection 2 mg  2 mg IntraVENous Q4H PRN  
 ondansetron (ZOFRAN) injection 4 mg  4 mg IntraVENous Q4H PRN Lab Review:  
 
Recent Labs  
   09/11/18 
 0350  09/10/18 
 0124 WBC  8.5  9.0 HGB  11.0*  11.8 HCT  36.3  38.7 PLT  218  227 Recent Labs  
   09/12/18 
 0601  09/11/18 
 0350  09/10/18 
 0124 NA  142  142  138  
K  4.4  4.5  5.3*  
CL  107  108  106 CO2  28  28  27 GLU  97  95  86 BUN  15  20  28* CREA  0.92  1.05*  1.34* CA  9.0  8.9  8.5 MG  2.2  2.2  2.4 PHOS  2.0*  2.3*   --   
ALB   --   2.1*   --   
TBILI   --   0.4   --   
SGOT   --   17   --   
ALT   --   22   --   
 
Lab Results Component Value Date/Time Glucose (POC) 159 (H) 05/11/2012 09:58 PM  
 Glucose (POC) 116 (H) 04/29/2012 06:23 PM  
 
 
  
Assessment / Plan: Active Problems: 
 
81 yo hx of diastolic CHF, CKD 3, hypothyroid, presented w/ cholecystitis, empyema of gallbladder, s/p lap anam 09/08. Complicated by bacteremia, aflutter. Medicine is following as a consultant 1) Acute cholecystitis/empyema of gallbladder/bacteremia: s/p lap anam 09/08. Blood cx w/ E. Coli. ID recommended IV CTX and oral flagyl till 09/21. Awaiting Kush catheter placement. Rest of management per surgical team 
 
2) Dyspnea/atelectasis: use nebs prn. Encourage incentive spirometry 3) New atrial flutter: now rate controlled. Cont metoprolol, eliquis. Cards following 4) Chronic dCHF: cont BB, use diuretics prn 
 
5) CKD 3: Cr around baseline. Will monitor 6) Hypothyroid: cont synthroid. TSH was 0.05, but free T4 1.9, free T3 1.6. Defer management to outpatient primary care 7) Weakness: cont PT/OT. Awaiting rehab **Patient is being discharged today. Will sign off. Thank you for consult** Total time spent with patient: 25 Minutes Care Plan discussed with: Patient, family, nursing, gen surg Discussed:  Care Plan Prophylaxis:  eliquis Disposition:  per team 
        
___________________________________________________ Attending Physician: Tristen Lomeli MD

## 2018-09-12 NOTE — DISCHARGE INSTRUCTIONS
Future Appointments  Date Time Provider Melania Samuel   2019 1:20 PM Montse Rogers MD [facility]       Patient Discharge Instructions    Pierre Dean / [id number] : 1932    Admitted 2018 Discharged: 2018     Take Home Medications       · It is important that you take the medication exactly as they are prescribed. · Keep your medication in the bottles provided by the pharmacist and keep a list of the medication names, dosages, and times to be taken in your wallet. · Do not take other medications without consulting your doctor. · Do not drive, drink alcohol, or operate machinery when taking sedating pain medication. · Take colace daily while on pain medication to help prevent constipation. You may take over the counter laxatives such as Dulcolax or Miralax as needed for constipation      What to do at Home    Recommended diet: Low fat diet     Recommended activity: No heavy lifting or strenuous activity for 2 weeks. You may shower. You may drive once pain has improved and you no longer require pain medication. Follow up with Dr. Gino Montanez in 2 weeks. Call [facility] to make an appointment. Call Dr. Gino Montanez or go to the ER if you develop worsening pain, fever, vomiting, or any other symptoms that concern you.

## 2018-09-12 NOTE — DISCHARGE SUMMARY
Surgery Discharge Summary     Patient ID:  Truman Willoughby  [id number]  15 y.o.  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 9) E66.9    (HFpEF) heart failure with preserved ejection fraction ([facility] I50.30       Discharge Diagnoses: There are no discharge diagnoses documented for the most recent discharge. 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 9) E66.9    (HFpEF) heart failure with preserved ejection fraction (HCC) I50.30       Procedures for this admission: Procedure(s):  [address] [place] Course: Ms. Tammy Devries presented to the ED on DOA with c/o abdominal pain and malaise. She was found to have evidence of acute cholecystitis. She underwent lap anam the following day and was found to have calculous cholecystitis with empyema of gallbladder. She had an uneventful post-op course. She was found to have gram negative bacteremia and ID was consulted for ABX recommendations. Patient was discharged to rehab POD 3 on IV ABX through 9/21. Disposition: Rehab    Discharged Condition : Stable    Instructions:  Follow-up in office  in 2 weeks             Signed:  Josi Mullen  9/12/2018  4:50 PM

## 2018-09-12 NOTE — PROGRESS NOTES
Occupational therapy note: 
Chart reviewed. Patient currently off the floor for Kush catheter placement. Will follow up with patient for continued OT treatment. Marija Johnson MS OTR/L

## 2018-09-12 NOTE — PROGRESS NOTES
Paged at 2012 last night, reference patient and line placement for 9/12/18; Spoke with Dietra Hives, and she advised that she was to hold Eliquis, however patient received evening dose of medication. Amado Lund would notify IR doctor in am, unsure if procedure would be able to be completed on 9/12/18, may have to be completed on 9/13/18; Will notified RN in the morning

## 2018-09-12 NOTE — PROGRESS NOTES
9/12/2018   CARE MANAGEMENT NOTE:  [facility] [employer] Bed is available today if pt is medically cleared for discharge. Rocky

## 2018-09-12 NOTE — PROGRESS NOTES
General Surgery Daily Progress Note Patient: Dash Velasquez MRN: [id number]  SSN: [id number] YOB: 1932  Age: 80 y.o. Sex: female Admit Date: 9/7/2018 Subjective:  
Feeling OK. Tolerating diet. For Kush today. Current Facility-Administered Medications Medication Dose Route Frequency  potassium, sodium phosphates (NEUTRA-PHOS) packet 2 Packet  2 Packet Oral ONCE  
 albuterol-ipratropium (DUO-NEB) 2.5 MG-0.5 MG/3 ML  3 mL Nebulization NOW  albuterol-ipratropium (DUO-NEB) 2.5 MG-0.5 MG/3 ML  3 mL Nebulization Q6H PRN  
 fentaNYL citrate (PF) injection  mcg   mcg IntraVENous Multiple  lidocaine (XYLOCAINE) 10 mg/mL (1 %) injection 20 mL  20 mL SubCUTAneous Multiple  midazolam (VERSED) injection 0.5-10 mg  0.5-10 mg IntraVENous Multiple  iopamidol (ISOVUE-370) 76 % injection 10-50 mL  10-50 mL IntraVENous Multiple  cefTRIAXone (ROCEPHIN) 2 g in 0.9% sodium chloride (MBP/ADV) 50 mL  2 g IntraVENous Q24H  
 metroNIDAZOLE (FLAGYL) tablet 500 mg  500 mg Oral Q12H  
 metoprolol succinate (TOPROL-XL) XL tablet 75 mg  75 mg Oral DAILY  traMADol (ULTRAM) tablet 50 mg  50 mg Oral Q6H PRN  
 docusate sodium (COLACE) capsule 100 mg  100 mg Oral BID  
 apixaban (ELIQUIS) tablet 5 mg  5 mg Oral BID  levothyroxine (SYNTHROID) tablet 150 mcg  150 mcg Oral Once per day on Mon Tue Wed Thu Fri Sat  levothyroxine (SYNTHROID) tablet 75 mcg  75 mcg Oral every Sunday  cholecalciferol (VITAMIN D3) tablet 1,000 Units  1,000 Units Oral DAILY  sodium chloride (NS) flush 5-10 mL  5-10 mL IntraVENous Q8H  
 sodium chloride (NS) flush 5-10 mL  5-10 mL IntraVENous PRN  
 naloxone (NARCAN) injection 0.4 mg  0.4 mg IntraVENous PRN  
 acetaminophen (TYLENOL) tablet 650 mg  650 mg Oral Q6H PRN  
 morphine injection 2 mg  2 mg IntraVENous Q4H PRN  
 ondansetron (ZOFRAN) injection 4 mg  4 mg IntraVENous Q4H PRN Objective:  
  
09/10 1901 - 09/12 0700 In: -  
 Out: 2150 [Urine:2150] Patient Vitals for the past 8 hrs: 
 BP Temp Pulse Resp SpO2  
09/12/18 1203 114/67 98.4 °F (36.9 °C) 77 18 97 % 09/12/18 0830 122/72 98 °F (36.7 °C) 83 18 97 % 09/12/18 0700 - - 82 - - Physical Exam: 
General: Alert, cooperative, NAD Lungs: Unlabored Heart:  Regular rate and  rhythm Abdomen: Soft, appropriately tender, non distended. + bowel sounds. Incisions c/d/i. Extremities: Warm, moves all, no edema Skin:  Warm and dry, no rash Labs:  
Recent Labs  
   09/11/18 
 0350 WBC  8.5 HGB  11.0*  
HCT  36.3 PLT  218 Recent Labs  
   09/12/18 
 0601  09/11/18 
 0350 NA  142  142  
K  4.4  4.5  
CL  107  108 CO2  28  28 GLU  97  95 BUN  15  20 CREA  0.92  1.05* CA  9.0  8.9 MG  2.2  2.2 PHOS  2.0*  2.3* ALB   --   2.1* TBILI   --   0.4 SGOT   --   17 ALT   --   22 Assessment / Plan:  
· POD#3 lap anam · Exam appropriate · Diet as tolerated · PO pain meds · Medical management per hospitalist 
· Will need IV ABX through 9/21. For [place] today · Discharge to rehab

## 2018-09-12 NOTE — ROUTINE PROCESS
Chart reviewed for Pre-Procedure evaluation and documentation. -- Areas reviewed, BUT NOT LIMITED TO, are Patient's current and past H&P, Labs, all Notes, all Media records, radiology records, and medications. Spoke with Dr. Radha Robledo, IR, agrees with doing brina placement today around 1 pm. Aware that patient had last nights pm dose of eliquis. Please hold eliquis am dose and keep NPO. Notified Macky Kussmaul, Rn of al the above.

## 2018-09-12 NOTE — PROGRESS NOTES
9/11 7pm 
 
RN mistakenly administered eliquis at [facility], although IR requested that 6pm and 9am (9/12) eliquis be held because patient is scheduled for Brina catheter placement 9/12 a.m. Paged IR at [phone]. Isaura returned call. Advised her of above. She said she will be here in the a.m. and will get this rescheduled, either for tomorrow evening or Thur a.m. Notified night RN. 
 
9/12 8:05am Called Cath lab re above. Spoke to Ayden, who will contact MD and find out when brina cath insertion will be done.

## 2018-09-12 NOTE — PROGRESS NOTES
9/12/2018   CARE MANAGEMENT NOTE:  Pt is medically cleared for discharge to [facility]. Pt will require continuation of IV ABX (Ceftriaxone 2 gm IV daily thru 9/21/18). Faxed orders, Kush report, AVS, RX and today's MAR and a hard copy will accompany pt to rehab facility. Ambulance transport has been arranged for 4 p.m . CM notified Nurse Navigator of pt's discharge. RN, please call report to [phone]. Rocky

## 2018-09-12 NOTE — PROGRESS NOTES
Bedside shift change report given to ZELDA Flores (oncoming nurse) by Yvon Yu (offgoing nurse). Report included the following information SBAR, Kardex, Intake/Output and MAR.

## 2018-09-12 NOTE — H&P
Radiology History and Physical 
 
Patient: Dash Velasquez 80 y.o. female Chief Complaint: Fatigue History of Present Illness: long term antibiotics History: 
 
Past Medical History:  
Diagnosis Date  (HFpEF) heart failure with preserved ejection fraction ([facility] 2012  
 first presented  in the setting of sepsis  Anemia   
 hx b12 def age 22-31s  COPD (chronic obstructive pulmonary disease) ([facility] 2011 FEV1 1 L 2011. Dr. Rosendo Salgado  GERD (gastroesophageal reflux disease)  Glaucoma suspect of both eyes Dr Berlin Lesch  Hemorrhoids  Migraine  Mixed stress and urge urinary incontinence  OA (osteoarthritis) of knee R, Dr. Croft Mort. Euflexa injections x3. 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 Eve Barkley  Paresthesia of foot, bilateral   
 Psoriasis   
 left ear  PSVT (paroxysmal supraventricular tachycardia) ([facility] 2012 Dr. Pilar Conley  Pulmonary nodules 2011 RLL x2.  stable 2012  Dr. 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   
  breast  
 Cancer Sister   
  lymphopma  Heart Disease Father  Heart Disease Paternal Grandfather  Seizures Sister 24 [facility] Arthritis-osteo Mother  Dementia Mother  Malignant Hyperthermia Neg Hx  Pseudocholinesterase Deficiency Neg Hx  Delayed Awakening Neg Hx  Post-op Nausea/Vomiting Neg Hx  Emergence Delirium Neg Hx  Post-op Cognitive Dysfunction Neg Hx   
 Other Neg Hx Social History Social History  Marital status:  Spouse name:   Number of children: 1  Years of education: N/A Occupational History  Retired Social History Main Topics  Smoking status: Former Smoker Packs/day: 1.00 Years: 60.00 Types: Cigarettes Quit date: 8/27/2011  Smokeless tobacco: Never Used  Alcohol use No  
 Drug use: No  
 Sexual activity: No  
 
Other Topics Concern  Not on file Social History Narrative Allergies: Allergies Allergen Reactions  Ciprofloxacin (Bulk) Rash  Diltiazem Rash  Piperacillin Other (comments) Appears to have tolerated 5/13/12-5/23/12 Current Medications: 
Current Facility-Administered Medications Medication Dose Route Frequency  potassium, sodium phosphates (NEUTRA-PHOS) packet 2 Packet  2 Packet Oral ONCE  
 albuterol-ipratropium (DUO-NEB) 2.5 MG-0.5 MG/3 ML  3 mL Nebulization Q6H PRN  
 cefTRIAXone (ROCEPHIN) 2 g in 0.9% sodium chloride (MBP/ADV) 50 mL  2 g IntraVENous Q24H  
 metroNIDAZOLE (FLAGYL) tablet 500 mg  500 mg Oral Q12H  
 metoprolol succinate (TOPROL-XL) XL tablet 75 mg  75 mg Oral DAILY  traMADol (ULTRAM) tablet 50 mg  50 mg Oral Q6H PRN  
 docusate sodium (COLACE) capsule 100 mg  100 mg Oral BID  
 apixaban (ELIQUIS) tablet 5 mg  5 mg Oral BID  levothyroxine (SYNTHROID) tablet 150 mcg  150 mcg Oral Once per day on Mon Tue Wed Thu Fri Sat  levothyroxine (SYNTHROID) tablet 75 mcg  75 mcg Oral every Sunday  cholecalciferol (VITAMIN D3) tablet 1,000 Units  1,000 Units Oral DAILY  sodium chloride (NS) flush 5-10 mL  5-10 mL IntraVENous Q8H  
 sodium chloride (NS) flush 5-10 mL  5-10 mL IntraVENous PRN  
 naloxone (NARCAN) injection 0.4 mg  0.4 mg IntraVENous PRN  
 acetaminophen (TYLENOL) tablet 650 mg  650 mg Oral Q6H PRN  
 morphine injection 2 mg  2 mg IntraVENous Q4H PRN  
 ondansetron (ZOFRAN) injection 4 mg  4 mg IntraVENous Q4H PRN Physical Exam: 
Blood pressure 121/61, pulse 77, temperature 98.4 °F (36.9 °C), resp. rate (!) 31, height 5' 10\" (1.778 m), weight 97.1 kg (214 lb), SpO2 92 %.  
LUNG: clear to auscultation bilaterally, HEART: regular rate and rhythm, S1, S2 normal, no murmur, click, rub or gallop Alerts:   
Hospital Problems  Date Reviewed: 9/8/2018 Codes Class Noted POA Cholecystitis ICD-10-CM: K81.9 ICD-9-CM: 575.10  9/7/2018 Yes  
   
 ARF (acute renal failure) (HCC) ICD-10-CM: N17.9 ICD-9-CM: 584.9  9/7/2018 Yes Obesity (BMI 30.0-34.9) (Chronic) ICD-10-CM: K73.9 ICD-9-CM: 278.00  9/7/2018 Yes CKD (chronic kidney disease), stage III (Chronic) ICD-10-CM: N18.3 ICD-9-CM: 585.3  11/16/2017 Yes Diastolic heart failure (HCC) (Chronic) ICD-10-CM: I50.30 ICD-9-CM: 428.30  4/26/2013 Yes (HFpEF) heart failure with preserved ejection fraction (HCC) ICD-10-CM: I50.30 ICD-9-CM: 428.9  5/22/2012 Yes Overview Signed 9/8/2018  7:45 AM by Martina Dueñas MD  
  first presented 2012 in the setting of sepsis Hypothyroidism (Chronic) ICD-10-CM: E03.9 ICD-9-CM: 244.9  Unknown Yes Overview Signed 7/11/2011 10:50 AM by Rio Pantoja MD  
  hx ROSAS for hyperthyroidism Laboratory:     
Recent Labs  
   09/12/18 
 0601  09/11/18 
 0350 HGB   --   11.0*  
HCT   --   36.3 WBC   --   8.5 PLT   --   218 BUN  15  20 CREA  0.92  1.05* K  4.4  4.5 Plan of Care/Planned Procedure: 
Risks, benefits, and alternatives reviewed with patient and she agrees to proceed with the procedure. Plan is for right sided brina catheter placement Chriss Sandhoff, MD

## 2018-09-12 NOTE — PROGRESS NOTES
,now 
TRANSFER - IN REPORT: 
 
Verbal report received from [facility]) on [facility]  being received from Angio Lab(unit) for routine post - op  [facility] Insertion Report consisted of patients Situation, Background, Assessment and  
Recommendations(SBAR). Information from the following report(s) Procedure Summary was reviewed with the receiving nurse. Opportunity for questions and clarification was provided. Assessment completed upon patients arrival to unit and care assumed. 2:47 PM 
TRANSFER - OUT REPORT: 
 
Verbal report given to 5th Floor RN(name) on Obdulio Monks  being transferred to [facility](unit) for routine progression of care Report consisted of patients Situation, Background, Assessment and  
Recommendations(SBAR). Information from the following report(s) SBAR, Procedure Summary, Intake/Output, Recent Results and Cardiac Rhythm NSR was reviewed with the receiving nurse. Lines:  
Double Lumen LETICIA TUNNELED CVC 09/12/18 Right Other(comment) (Active) Opportunity for questions and clarification was provided. Patient transported with: @ 3:00PM 
 Monitor Registered Nurse Tech

## 2018-09-12 NOTE — PROGRESS NOTES
PHYSICAL THERAPY:Pt and family report port placement to happen later today and declined PT for now. PT will follow later today as time allows.

## 2018-09-12 NOTE — PROGRESS NOTES
[facility] Progress Note Lucie Gonzalez DO 
  [phone] Office [phone]  Fax 9/12/2018 Assessment & Plan:  
Cholecystitis with empyema of the gallbladder. The patient is status post laparoscopic cholecystectomy on September 8th. Klebsiella and Escherichia coli are growing from the culture taken from the gallbladder. Continue ceftriaxone and oral metronidazole. She will complete a 14-day course of antibiotic therapy for the bacteremia, which will cover the cholecystitis as well. Escherichia coli bacteremia. Plan to continue ceftriaxone through 09/21/2018. Klebsiella pneumoniae urinary tract infection. Continue antibiotics as above. Chronic kidney disease. Brina catheter placed Subjective: No complaints Objective:  
 
Vitals:  
Visit Vitals  /61 (BP 1 Location: Left arm, BP Patient Position: At rest)  Pulse 77  Temp 98.4 °F (36.9 °C)  Resp (!) 31  
 Ht 5' 10\" (1.778 m)  Wt 97.1 kg (214 lb)  SpO2 92%  BMI 30.71 kg/m2 Tmax:   
 
Physical Examination:  
General:  Alert, cooperative, no distress Head:  Normocephalic, atraumatic. Eyes:    
Neck:   
   
Lungs:   No distress. Chest wall:  RCW brina Heart:    
Abdomen:     
Extremities:   
Skin:   
Neurologic:   
 
Labs:     
 
No lab exists for component: ITNL No results for input(s): CPK, CKMB, TROIQ in the last 72 hours. Recent Labs  
   09/12/18 
 0601  09/11/18 
 0350  09/10/18 
 0124 NA  142  142  138  
K  4.4  4.5  5.3*  
CL  107  108  106 CO2  28  28  27 BUN  15  20  28* CREA  0.92  1.05*  1.34* GLU  97  95  86 PHOS  2.0*  2.3*   --   
MG  2.2  2.2  2.4 ALB   --   2.1*   --   
WBC   --   8.5  9.0 HGB   --   11.0*  11.8 HCT   --   36.3  38.7 PLT   --   218  227 No results for input(s): INR, PTP, APTT in the last 72 hours.  
 
No lab exists for component: INREXT 
 Needs: urine analysis, urine sodium, protein and creatinine No results found for: [place] Organ Cultures:  
 
Lab Results Component Value Date/Time Specimen Description: BLOOD 05/14/2012 01:10 PM  
 Specimen Description: BLOOD 05/11/2012 10:00 PM  
 Specimen Description: URINE 05/11/2012 10:00 PM  
 
Lab Results Component Value Date/Time Culture result: NO GROWTH 4 DAYS 09/08/2018 02:00 PM  
 Culture result: NO GROWTH 4 DAYS 09/08/2018 01:55 PM  
 Culture result: LIGHT ESCHERICHIA COLI (A) 09/08/2018 01:00 PM  
 Culture result: LIGHT KLEBSIELLA PNEUMONIAE (A) 09/08/2018 01:00 PM  
 Culture result: NO ANAEROBES ISOLATED 09/08/2018 01:00 PM  
 
 
Radiology:  
 
Medications Current Facility-Administered Medications Medication Dose Route Frequency Last Dose  potassium, sodium phosphates (NEUTRA-PHOS) packet 2 Packet  2 Packet Oral ONCE Stopped at 09/12/18 0800  
 albuterol-ipratropium (DUO-NEB) 2.5 MG-0.5 MG/3 ML  3 mL Nebulization Q6H PRN    
 cefTRIAXone (ROCEPHIN) 2 g in 0.9% sodium chloride (MBP/ADV) 50 mL  2 g IntraVENous Q24H 2 g at 09/11/18 1843  
 metroNIDAZOLE (FLAGYL) tablet 500 mg  500 mg Oral Q12H 500 mg at 09/12/18 0930  
 metoprolol succinate (TOPROL-XL) XL tablet 75 mg  75 mg Oral DAILY 75 mg at 09/12/18 0930  
 traMADol (ULTRAM) tablet 50 mg  50 mg Oral Q6H PRN    
 docusate sodium (COLACE) capsule 100 mg  100 mg Oral BID Stopped at 09/12/18 0900  
 apixaban (ELIQUIS) tablet 5 mg  5 mg Oral BID Stopped at 09/12/18 0900  levothyroxine (SYNTHROID) tablet 150 mcg  150 mcg Oral Once per day on Mon Tue Wed Thu Fri Sat 150 mcg at 09/12/18 1553  levothyroxine (SYNTHROID) tablet 75 mcg  75 mcg Oral every Sunday 75 mcg at 09/09/18 5125  cholecalciferol (VITAMIN D3) tablet 1,000 Units  1,000 Units Oral DAILY Stopped at 09/12/18 0900  
 sodium chloride (NS) flush 5-10 mL  5-10 mL IntraVENous Q8H 10 mL at 09/12/18 0600  sodium chloride (NS) flush 5-10 mL  5-10 mL IntraVENous PRN    
 naloxone (NARCAN) injection 0.4 mg  0.4 mg IntraVENous PRN    
 acetaminophen (TYLENOL) tablet 650 mg  650 mg Oral Q6H  mg at 09/09/18 1634  morphine injection 2 mg  2 mg IntraVENous Q4H PRN 2 mg at 09/08/18 2245  ondansetron (ZOFRAN) injection 4 mg  4 mg IntraVENous Q4H PRN Case discussed with: 
 
 
Kaylah Araya DO

## 2018-09-13 LAB
BACTERIA SPEC CULT: ABNORMAL
SERVICE CMNT-IMP: ABNORMAL

## 2018-09-14 ENCOUNTER — PATIENT OUTREACH (OUTPATIENT)
Dept: INTERNAL MEDICINE CLINIC | Age: 83
End: 2018-09-14

## 2018-09-14 LAB
BACTERIA SPEC CULT: NORMAL
BACTERIA SPEC CULT: NORMAL
SERVICE CMNT-IMP: NORMAL
SERVICE CMNT-IMP: NORMAL

## 2018-09-14 RX ORDER — GABAPENTIN 100 MG/1
CAPSULE ORAL 3 TIMES DAILY
COMMUNITY
End: 2018-10-01

## 2018-09-14 NOTE — PROGRESS NOTES
Transition of Care Coordination/Hospital to Post Acute Facility: 
  
Date/Time:  2018 1:43 PM 
 
Patient was admitted to [place] [address] on 2018 and discharged on 2018 for cholecystis. Patient was transferred to [facility] for continuation of care. Inpatient RRAT score: 19 Top Challenges reviewed · Klebsiella and Escherichia coli from the culture taken from the gallbladder.     
· Escherichia coli bacteremia.   
· Klebsiella pneumoniae urinary tract infection.    
 
Method of communication with care team :face to face Nurse Navigator(NN) spoke with family to provide introduction to self and explanation of the Nurse Navigator Role. Verified name and  as patient identifiers. Discussed and reviewed anticipated length of stay, anticipated needs at time of discharge, follow up appointments. Daughter Priyanka Chadwick will schedule an appointment with PCP on Monday after discharge date is established. ACP:  
Does the patient have a current ACP (including DDNR):  No 
Does the post acute facility have a copy of the patients ACP: N/A Medication(s): 
New Medications at Discharge: 
 cefTRIAXone 2 gram 2 g, ADDaptor 1 Device IVPB 2 g IntraVENous Q24H  
metronidazole 500 mg Oral Q12H Changed Medications at Discharge: No 
Discontinued Medications at Discharge: None reported PCP/Specialist follow up: Future Appointments Date Time Provider Melania Samuel 2018 10:00 AM [place]  
2019 1:20 PM Stefano Severance, MD [address] [place] [address] Opportunity to ask questions was provided. Contact information was provided for future reference or further questions. Will continue to monitor.

## 2018-09-19 ENCOUNTER — TELEPHONE (OUTPATIENT)
Dept: FAMILY MEDICINE CLINIC | Age: 83
End: 2018-09-19

## 2018-09-19 NOTE — TELEPHONE ENCOUNTER
Traci Nichols called from [facility] requesting call back to advise if pt needs to be off blood thinners 2 days prior to home cath removal scheduled for 9/27/18, noting she was advised of this by [address] [place] [address] but [facility] did not require pt to be off blood thinners. Please call today if possible at [phone] or if unable to reach Traci Nichols, call  Bandar Carrasco at [phone].  Ldm

## 2018-09-26 ENCOUNTER — TELEPHONE (OUTPATIENT)
Dept: INTERNAL MEDICINE CLINIC | Age: 83
End: 2018-09-26

## 2018-09-26 ENCOUNTER — HOSPITAL ENCOUNTER (OUTPATIENT)
Dept: INTERVENTIONAL RADIOLOGY/VASCULAR | Age: 83
Discharge: HOME | End: 2018-09-26
Attending: INTERNAL MEDICINE | Admitting: RADIOLOGY
Payer: MEDICARE

## 2018-09-26 VITALS — BODY MASS INDEX: 31.4 KG/M2 | WEIGHT: 219.36 LBS | HEIGHT: 70 IN

## 2018-09-26 DIAGNOSIS — Z79.2 ANTIBIOTIC LONG-TERM USE: ICD-10-CM

## 2018-09-26 PROCEDURE — 77030029065 HC DRSG HEMO QCLOT ZMED -B

## 2018-09-26 PROCEDURE — 36589 REMOVAL TUNNELED CV CATH: CPT

## 2018-09-26 NOTE — PROGRESS NOTES
Pt chart accessed to review all areas including but not limited to pt history, test results, labs, and orders in preparation for possible Angio/Cath/EP procedure. 1:27 PM 
 
Patient arrived. ID and allergies verified verbally with patient. Pt voices understanding of procedure to be performed. Consent obtained. Pt prepped for procedure. Pt denies contrast allergy. Patient denies taking any blood thinners. 1:33 PM 
Kush removal complete Discharge instructions reviewed with patient and family - instructed to sit upright for 2 hours post procedure and resume Eliquis tonight. Voiced understanding.

## 2018-09-26 NOTE — TELEPHONE ENCOUNTER
Lubna Britton with [facility] call in to advise Dr Rossi Morton that she started today going to have nursing, OT, PT starting today and wanted to verify Dr Rossi Morton would be signing orders for her.  Please call her at [phone] Neonatology

## 2018-09-28 ENCOUNTER — TELEPHONE (OUTPATIENT)
Dept: INTERNAL MEDICINE CLINIC | Age: 83
End: 2018-09-28

## 2018-09-28 NOTE — TELEPHONE ENCOUNTER
Antoinette Manzo with [facility] would like Verbal Order for Strengthen and Fall Precaution 2 twice for 6 weeks as long as she is here. If she go's to her daughter will not do.   Antoinette Manzo no [phone]

## 2018-10-01 ENCOUNTER — OFFICE VISIT (OUTPATIENT)
Dept: INTERNAL MEDICINE CLINIC | Age: 83
End: 2018-10-01

## 2018-10-01 VITALS
BODY MASS INDEX: 31.21 KG/M2 | HEART RATE: 76 BPM | OXYGEN SATURATION: 95 % | SYSTOLIC BLOOD PRESSURE: 115 MMHG | WEIGHT: 218 LBS | RESPIRATION RATE: 14 BRPM | TEMPERATURE: 97.9 F | DIASTOLIC BLOOD PRESSURE: 78 MMHG | HEIGHT: 70 IN

## 2018-10-01 DIAGNOSIS — M81.0 OSTEOPOROSIS, UNSPECIFIED OSTEOPOROSIS TYPE, UNSPECIFIED PATHOLOGICAL FRACTURE PRESENCE: ICD-10-CM

## 2018-10-01 DIAGNOSIS — G57.93 NEUROPATHY OF BOTH FEET: ICD-10-CM

## 2018-10-01 DIAGNOSIS — Z23 ENCOUNTER FOR IMMUNIZATION: ICD-10-CM

## 2018-10-01 DIAGNOSIS — N18.30 CKD (CHRONIC KIDNEY DISEASE), STAGE III (HCC): Chronic | ICD-10-CM

## 2018-10-01 DIAGNOSIS — I50.30 (HFPEF) HEART FAILURE WITH PRESERVED EJECTION FRACTION (HCC): ICD-10-CM

## 2018-10-01 DIAGNOSIS — R53.1 WEAKNESS: Primary | ICD-10-CM

## 2018-10-01 DIAGNOSIS — Z87.19 HISTORY OF CHOLECYSTITIS: ICD-10-CM

## 2018-10-01 DIAGNOSIS — Z86.19: ICD-10-CM

## 2018-10-01 DIAGNOSIS — E03.9 HYPOTHYROIDISM, UNSPECIFIED TYPE: Chronic | ICD-10-CM

## 2018-10-01 PROBLEM — N17.9 ARF (ACUTE RENAL FAILURE) (HCC): Status: RESOLVED | Noted: 2018-09-07 | Resolved: 2018-10-01

## 2018-10-01 PROBLEM — K81.9 CHOLECYSTITIS: Status: RESOLVED | Noted: 2018-09-07 | Resolved: 2018-10-01

## 2018-10-01 RX ORDER — LISINOPRIL 10 MG/1
10 TABLET ORAL DAILY
Qty: 90 TAB | Refills: 1
Start: 2018-10-01 | End: 2018-10-01

## 2018-10-01 NOTE — MR AVS SNAPSHOT
[facility] 
 
 
 [address] [place] [address] [address] 
[phone] Patient: Juana Bahena MRN: [id number] FVP:2/71/8307 Visit Information Date & Time Provider Department Dept. Phone Encounter #  
 10/1/2018 11:30 AM Hazel Williamson MD [facility] [address]  [id number] Your Appointments 10/3/2018 10:30 AM  
ESTABLISHED PATIENT with Deisi Lemus NP  
[facility] ([facility]) Appt Note: 2 wk hosp fu appt  pt see's Dr Nyasia Kimball; 2 wk hosp fu appt pt see's Dr Nyasia Kimball pt still in hospital  
 [facility] [address] [place] [address]  
[phone]  
  
   
 [address] [place] [address]  
  
    
 6/6/2019  1:20 PM  
ESTABLISHED PATIENT with Sergio Garcia MD  
[facility] ([facility]) Appt Note: annual  
 [facility] [address] [place] [address] Upcoming Health Maintenance Date Due  
 MEDICARE YEARLY EXAM 3/14/2018 Influenza Age 5 to Adult 8/1/2018 GLAUCOMA SCREENING Q2Y 10/7/2018 Shingrix Vaccine Age 50> (1 of 2) 11/1/2018* DTaP/Tdap/Td series (2 - Td) 11/16/2027 *Topic was postponed. The date shown is not the original due date. Allergies as of 10/1/2018  Review Complete On: 10/1/2018 By: Hazel Williamson MD  
  
 Severity Noted Reaction Type Reactions Ciprofloxacin (Bulk)  05/07/2012    Rash Diltiazem  06/15/2012    Rash Piperacillin  04/26/2013    Other (comments) Appears to have tolerated 5/13/12-5/23/12 Current Immunizations  Reviewed on 10/1/2018 Name Date Influenza High Dose Vaccine PF 11/16/2017, 11/17/2016, 12/16/2015 Influenza Vaccine 9/13/2014, 9/15/2013 Influenza Vaccine (Tri) Adjuvanted  Incomplete Pneumococcal Conjugate (PCV-13) 12/16/2015 Pneumococcal Polysaccharide (PPSV-23) 10/1/2009 Zoster Vaccine, Live 3/13/2017 Reviewed by Gerri Fierro MD on 10/1/2018 at 12:39 PM  
You Were Diagnosed With   
  
 Codes Comments Weakness    -  Primary ICD-10-CM: R53.1 ICD-9-CM: 780.79 History of cholecystitis     ICD-10-CM: Z87.19 ICD-9-CM: V12.79 History of gram negative infection     ICD-10-CM: Z86.19 ICD-9-CM: V12.09   
 CKD (chronic kidney disease), stage III     ICD-10-CM: N18.3 ICD-9-CM: 585.3 (HFpEF) heart failure with preserved ejection fraction (HCC)     ICD-10-CM: I50.30 ICD-9-CM: 428.9 Hypothyroidism, unspecified type     ICD-10-CM: E03.9 ICD-9-CM: 244.9 Osteoporosis, unspecified osteoporosis type, unspecified pathological fracture presence     ICD-10-CM: M81.0 ICD-9-CM: 733.00 Neuropathy of both feet     ICD-10-CM: G57.93 
ICD-9-CM: 356.9 Encounter for immunization     ICD-10-CM: P42 ICD-9-CM: V03.89 Vitals BP Pulse Temp Resp Height(growth percentile) Weight(growth percentile) 115/78 (BP 1 Location: Left arm, BP Patient Position: Sitting) 76 97.9 °F (36.6 °C) (Oral) 14 5' 10\" (1.778 m) 218 lb (98.9 kg) SpO2 BMI OB Status Smoking Status 95% 31.28 kg/m2 Hysterectomy Former Smoker BMI and BSA Data Body Mass Index Body Surface Area  
 31.28 kg/m 2 2.21 m 2 Preferred Pharmacy Pharmacy Name Phone [facility]/PHARMACY #[id number] - [place], [place] - [address] [address] [place] [phone] Your Updated Medication List  
  
   
This list is accurate as of 10/1/18 12:39 PM.  Always use your most recent med list.  
  
  
  
  
 albuterol 90 mcg/actuation inhaler Commonly known as:  PROVENTIL HFA Take 2 Puffs by inhalation every four (4) hours as needed for Wheezing. apixaban 5 mg tablet Commonly known as:  Dina Kos Take 1 Tab by mouth two (2) times a day. ASPIR-81 81 mg tablet Generic drug:  aspirin delayed-release Take 81 mg by mouth daily. levothyroxine 150 mcg tablet Commonly known as:  SYNTHROID Take 150 mcg by mouth six (6) days a week. Monday - Saturday = 150 mcg Sunday = 75 mcg (1/2 of a 150 mcg tablet)  
  
 metoprolol succinate 50 mg XL tablet Commonly known as:  TOPROL-XL Take 50 mg by mouth daily (after lunch). PRESERVISION AREDS Cap capsule Generic drug:  Vit A,C,E-Zinc-Copper Take 1 Cap by mouth two (2) times a day. torsemide 10 mg tablet Commonly known as:  DEMADEX Take 1 Tab by mouth daily. VITAMIN D3 1,000 unit Cap Generic drug:  cholecalciferol Take 1,000 Units by mouth daily. We Performed the Following ADMIN INFLUENZA VIRUS VAC [ HCPCS] CBC W/O DIFF [18576 CPT(R)] INFLUENZA VACCINE INACTIVATED (IIV), SUBUNIT, ADJUVANTED, IM [id number] CPT(R)] METABOLIC PANEL, BASIC [22620 CPT(R)] PROTEIN ELECTROPHORESIS W/ REFLX SRIKANTH [[id number] Custom] T4, FREE [id number] CPT(R)] TSH 3RD GENERATION [88603 CPT(R)] VITAMIN B12 [id number] CPT(R)] VITAMIN D, 25 HYDROXY [id number] CPT(R)] Introducing hospitals & HEALTH SERVICES! Dear Landon Perez: Thank you for requesting a eCommHub account. Our records indicate that you already have an active eCommHub account. You can access your account anytime at [email or web address] Did you know that you can access your hospital and ER discharge instructions at any time in eCommHub? You can also review all of your test results from your hospital stay or ER visit. Additional Information If you have questions, please visit the Frequently Asked Questions section of the eCommHub website at [email or web address] Remember, eCommHub is NOT to be used for urgent needs. For medical emergencies, dial 911. Now available from your iPhone and Android! Please provide this summary of care documentation to your next provider. Your primary care clinician is listed as Jimmie Burks. If you have any questions after today's visit, please call [phone].

## 2018-10-02 NOTE — PROGRESS NOTES
HISTORY OF PRESENT ILLNESS Chief Complaint Patient presents with  
[facility] Follow Up Cholecystitis Presents for 7-day transitional care management (TCM) visit s/p of hospital admission 9/7-9/12 Nurse Navigator call noted to patient and documented in [facility] Care. Hospital record and relevant lab results, test results and consult notes have personally been reviewed by me at this office visit. Medications reviewed and reconciled. Procedures for this admission: Procedure(s): CHOLECYSTECTOMY LAPAROSCOPIC 
  
Hospital Course: Ms. Doc Torres presented to the ED on DOA with c/o abdominal pain and malaise. She was found to have evidence of acute cholecystitis. She underwent lap anam the following day and was found to have calculous cholecystitis with empyema of gallbladder. She had an uneventful post-op course. She was found to have gram negative bacteremia and ID was consulted for ABX recommendations. Patient was discharged to rehab POD 3 on IV ABX through 9/21. She is here today with her daughter who is visiting from [place] [address] since yesterday. She is planing to move her back to [place] with her soon. Hypothyroidism follow-up Reports  Tremor  
denies heat/cold intolerance, bowel/skin changes or CVS symptoms, losing hair, feeling excessive energy, tremulousness, palpitations. Thyroid medication has been unchanged since last medication check and labs. Lab Results Component Value Date/Time TSH 0.05 (L) 09/11/2018 03:50 AM  
 T4, Free 1.9 (H) 09/11/2018 03:50 AM  
 
 
 
 
Review of Systems All other systems reviewed and are negative, except as noted in HPI Past Medical and Surgical History 
 has a past medical history of (HFpEF) heart failure with preserved ejection fraction ([address] (05/22/2012); Anemia; Atrial fibrillation ([address] (2018); Cholecystitis (2018); COPD (chronic obstructive pulmonary disease) (HCC) (7/2011); GERD (gastroesophageal reflux disease);  Glaucoma suspect of both eyes; Hemorrhoids; Migraine; Mixed stress and urge urinary incontinence; OA (osteoarthritis) of knee; Obesity (BMI 30.0-34.9) (9/7/2018); Osteoporosis; Other postablative hypothyroidism (1987); Paresthesia of foot, bilateral; Psoriasis; PSVT (paroxysmal supraventricular tachycardia) ([facility] (5/19/2012); Pulmonary nodules (7/2011); Sepsis(995.91) (5/14/2012); 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 has a past surgical history that includes hx tonsillectomy; hx hysterectomy (1974); hx cataract removal; echo 2d adult (7/15/11); nm cardiac spect w strs/rest mult (7/18/11); vas lower ext art pvr mult level seg pressures (7/18/11); echo 2d adult (5/19/12); hx colonoscopy (6/28/12); hx other surgical; and hx cholecystectomy (09/08/2018). reports that she quit smoking about 7 years ago. Her smoking use included Cigarettes. She has a 60.00 pack-year smoking history. She has never used smokeless tobacco. She reports that she does not drink alcohol or use illicit drugs. family history includes Arthritis-osteo in her mother; Cancer in her maternal grandmother and sister; Dementia in her mother; Heart Disease in her father and paternal grandfather; Seizures in her sister. There is no history of Malignant Hyperthermia, Pseudocholinesterase Deficiency, Delayed Awakening, Post-op Nausea/Vomiting, Emergence Delirium, Post-op Cognitive Dysfunction, or Other. Physical Exam  
Nursing note and vitals reviewed. Blood pressure 115/78, pulse 76, temperature 97.9 °F (36.6 °C), temperature source Oral, resp. rate 14, height 5' 10\" (1.778 m), weight 218 lb (98.9 kg), SpO2 95 %. Constitutional:  No distress. Eyes: Conjunctivae are normal.  
Ears:  Hearing grossly intact Right upper chest with healed site of catheter Cardiovascular: Normal rate. regular rhythm, no murmurs or gallops No edema Pulmonary/Chest: Effort normal.   CTAB Musculoskeletal: moves all 4 extremities Neurological: Alert and oriented to person, place, and time. Skin: No rash noted. Psychiatric: Normal mood and affect. Behavior is normal.  
 
ASSESSMENT and PLAN Diagnoses and all orders for this visit: 
 
1. Weakness Continues to benefit from OT/PT, home health 2. History of cholecystitis Currently asymptomatic Some loose stools. Consider cholestyramine prn 
 
3. History of gram negative infection Currently asymptomatic Completed abx 4. CKD (chronic kidney disease), stage III 
-     CBC W/O DIFF 
-     METABOLIC PANEL, BASIC 
 
5. (HFpEF) heart failure with preserved ejection fraction ([facility] 6. Hypothyroidism, unspecified type Likely is over-controlled. Repeat labs and adjust  
afib makes controlling this essential 
-     TSH 3RD GENERATION 
-     T4, FREE 
 
7. Osteoporosis, unspecified osteoporosis type, unspecified pathological fracture presence 
-     VITAMIN D, 25 HYDROXY 8. Neuropathy of both feet 
-     PROTEIN ELECTROPHORESIS W/ REFLX SRIKANTH 
-     VITAMIN B12 
 
9. Encounter for immunization -     Influenza Vaccine Inactivated (IIV)(FLUAD), Subunit, Adjuvanted, IM, (69547) -     Administration fee () for Medicare insured patients There are no Patient Instructions on file for this visit. 
 
lab results and schedule of future lab studies reviewed with patient 
reviewed medications and side effects in detail Return to clinic for further evaluation if new symptoms develop Follow-up Disposition: Not on File Current Outpatient Prescriptions Medication Sig  
 apixaban (ELIQUIS) 5 mg tablet Take 1 Tab by mouth two (2) times a day.  Vit A,C,E-Zinc-Copper (PRESERVISION AREDS) cap capsule Take 1 Cap by mouth two (2) times a day.  metoprolol succinate (TOPROL-XL) 50 mg XL tablet Take 50 mg by mouth daily (after lunch).   
 levothyroxine (SYNTHROID) 150 mcg tablet Take 150 mcg by mouth six (6) days a week. Monday - Saturday = 150 mcg Sunday = 75 mcg (1/2 of a 150 mcg tablet)  torsemide (DEMADEX) 10 mg tablet Take 1 Tab by mouth daily.  cholecalciferol (VITAMIN D3) 1,000 unit cap Take 1,000 Units by mouth daily.  albuterol (PROVENTIL HFA) 90 mcg/actuation inhaler Take 2 Puffs by inhalation every four (4) hours as needed for Wheezing.  aspirin delayed-release (ASPIR-81) 81 mg tablet Take 81 mg by mouth daily. No current facility-administered medications for this visit.

## 2018-10-03 ENCOUNTER — HOSPITAL ENCOUNTER (OUTPATIENT)
Dept: LAB | Age: 83
Discharge: HOME | End: 2018-10-03
Payer: MEDICARE

## 2018-10-03 ENCOUNTER — OFFICE VISIT (OUTPATIENT)
Dept: CARDIOLOGY CLINIC | Age: 83
End: 2018-10-03

## 2018-10-03 ENCOUNTER — TELEPHONE (OUTPATIENT)
Dept: INTERNAL MEDICINE CLINIC | Age: 83
End: 2018-10-03

## 2018-10-03 VITALS
BODY MASS INDEX: 31.21 KG/M2 | HEIGHT: 70 IN | DIASTOLIC BLOOD PRESSURE: 64 MMHG | WEIGHT: 218 LBS | HEART RATE: 75 BPM | OXYGEN SATURATION: 98 % | RESPIRATION RATE: 12 BRPM | SYSTOLIC BLOOD PRESSURE: 106 MMHG

## 2018-10-03 DIAGNOSIS — I50.32 CHRONIC DIASTOLIC HEART FAILURE (HCC): ICD-10-CM

## 2018-10-03 DIAGNOSIS — I48.92 ATRIAL FLUTTER, UNSPECIFIED TYPE (HCC): Primary | ICD-10-CM

## 2018-10-03 DIAGNOSIS — I87.2 VENOUS INSUFFICIENCY: ICD-10-CM

## 2018-10-03 DIAGNOSIS — I48.91 ATRIAL FIBRILLATION, UNSPECIFIED TYPE (HCC): ICD-10-CM

## 2018-10-03 DIAGNOSIS — I47.1 PSVT (PAROXYSMAL SUPRAVENTRICULAR TACHYCARDIA) (HCC): ICD-10-CM

## 2018-10-03 DIAGNOSIS — E78.5 DYSLIPIDEMIA: ICD-10-CM

## 2018-10-03 DIAGNOSIS — N18.30 CKD (CHRONIC KIDNEY DISEASE), STAGE III (HCC): ICD-10-CM

## 2018-10-03 PROCEDURE — 80061 LIPID PANEL: CPT

## 2018-10-03 PROCEDURE — 85027 COMPLETE CBC AUTOMATED: CPT

## 2018-10-03 PROCEDURE — 84439 ASSAY OF FREE THYROXINE: CPT

## 2018-10-03 PROCEDURE — 80048 BASIC METABOLIC PNL TOTAL CA: CPT

## 2018-10-03 PROCEDURE — 83735 ASSAY OF MAGNESIUM: CPT

## 2018-10-03 PROCEDURE — 84443 ASSAY THYROID STIM HORMONE: CPT

## 2018-10-03 PROCEDURE — 36415 COLL VENOUS BLD VENIPUNCTURE: CPT

## 2018-10-03 PROCEDURE — 82306 VITAMIN D 25 HYDROXY: CPT

## 2018-10-03 NOTE — TELEPHONE ENCOUNTER
[place] with [facility] patient is going to [place] till the first of the year , going to d/c home health services and patient denied to have  service  transferred to [place] .

## 2018-10-03 NOTE — PROGRESS NOTES
1. Have you been to the ER, urgent care clinic since your last visit? Hospitalized since your last visit? Yes [facility] 9/7/2018 Gallbladder    2. Have you seen or consulted any other health care providers outside of the [address] [place] [address] since your last visit? Include any pap smears or colon screening. No    Chief Complaint   Patient presents with    Irregular Heart Beat     81 y/o female reports to office from hospital visit. Pt states last hospital visit 9/18 Radha Fail removal. Pt states sent to follow up due to A-Fib after sugery. Pt states hx of Swelling bilateral ankles and numbness.        Visit Vitals    /64 (BP 1 Location: Right arm, BP Patient Position: Sitting)    Pulse 75    Resp 12    Ht 5' 10\" (1.778 m)    Wt 218 lb (98.9 kg)    SpO2 98%    BMI 31.28 kg/m2

## 2018-10-03 NOTE — PATIENT INSTRUCTIONS
Please follow-up in early January with Dr. Kaylee Alvarado    Have your labs drawn today. If you have racing heart beat, palpitations, chest pain, or worsening shortness of breath then please call us. Call for abnormal bleeding.

## 2018-10-03 NOTE — MR AVS SNAPSHOT
[address] [facility] 600 [address] [facility] 
[phone] Patient: Jalyn Carlson MRN: [id number] ADT:6/06/0742 Visit Information Date & Time Provider Department Dept. Phone Encounter #  
 10/3/2018 10:30 AM Shana Colorado NP [facility] Quintana [phone] [id number] Follow-up Instructions Return in about 3 months (around 1/3/2019), or if symptoms worsen or fail to improve. Your Appointments 6/6/2019  1:20 PM  
ESTABLISHED PATIENT with Danny Valverde MD  
[facility] ([facility]) Appt Note: annual  
 [address] [facility] 600 [address] [facility]  
[address] [facility] [address] [address] Upcoming Health Maintenance Date Due  
 MEDICARE YEARLY EXAM 3/14/2018 GLAUCOMA SCREENING Q2Y 10/7/2018 Shingrix Vaccine Age 50> (1 of 2) 11/1/2018* DTaP/Tdap/Td series (2 - Td) 11/16/2027 *Topic was postponed. The date shown is not the original due date. Allergies as of 10/3/2018  Review Complete On: 10/3/2018 By: Lupe Bryan Severity Noted Reaction Type Reactions Ciprofloxacin (Bulk)  05/07/2012    Rash Diltiazem  06/15/2012    Rash Piperacillin  04/26/2013    Other (comments) Appears to have tolerated 5/13/12-5/23/12 Current Immunizations  Reviewed on 10/1/2018 Name Date Influenza High Dose Vaccine PF 11/16/2017, 11/17/2016, 12/16/2015 Influenza Vaccine 9/13/2014, 9/15/2013 Influenza Vaccine (Tri) Adjuvanted 10/1/2018 Pneumococcal Conjugate (PCV-13) 12/16/2015 Pneumococcal Polysaccharide (PPSV-23) 10/1/2009 Zoster Vaccine, Live 3/13/2017 Not reviewed this visit You Were Diagnosed With   
  
 Codes Comments  Atrial flutter, unspecified type ([facility]    -  Primary ICD-10-CM: I48.92 
ICD-9-CM: 427.32   
 Chronic diastolic heart failure (HCC)     ICD-10-CM: I50.32 
ICD-9-CM: 428.32   
 PSVT (paroxysmal supraventricular tachycardia) (HCC)     ICD-10-CM: I47.1 ICD-9-CM: 427.0 Venous insufficiency     ICD-10-CM: I87.2 ICD-9-CM: 459.81 Dyslipidemia     ICD-10-CM: E78.5 ICD-9-CM: 272.4 CKD (chronic kidney disease), stage III (HCC)     ICD-10-CM: N18.3 ICD-9-CM: 411. 3 Atrial fibrillation, unspecified type ([facility]     ICD-10-CM: I48.91 
ICD-9-CM: 427.31 Vitals BP Pulse Resp Height(growth percentile) Weight(growth percentile) SpO2  
 106/64 (BP 1 Location: Right arm, BP Patient Position: Sitting) 75 12 5' 10\" (1.778 m) 218 lb (98.9 kg) 98% BMI OB Status Smoking Status 31.28 kg/m2 Hysterectomy Former Smoker Vitals History BMI and BSA Data Body Mass Index Body Surface Area  
 31.28 kg/m 2 2.21 m 2 Preferred Pharmacy Pharmacy Name Phone [facility]/PHARMACY #[id number] - [place], [place] - [address] [address] [facility] [address] [phone] Your Updated Medication List  
  
   
This list is accurate as of 10/3/18 11:45 AM.  Always use your most recent med list.  
  
  
  
  
 albuterol 90 mcg/actuation inhaler Commonly known as:  PROVENTIL HFA Take 2 Puffs by inhalation every four (4) hours as needed for Wheezing. apixaban 5 mg tablet Commonly known as:  Mariposa Lapine Take 1 Tab by mouth two (2) times a day. ASPIR-81 81 mg tablet Generic drug:  aspirin delayed-release Take 81 mg by mouth daily. levothyroxine 150 mcg tablet Commonly known as:  SYNTHROID Take 150 mcg by mouth six (6) days a week. Monday - Saturday = 150 mcg Sunday = 75 mcg (1/2 of a 150 mcg tablet)  
  
 metoprolol succinate 50 mg XL tablet Commonly known as:  TOPROL-XL Take 50 mg by mouth daily (after lunch). PRESERVISION AREDS Cap capsule Generic drug:  Vit A,C,E-Zinc-Copper Take 1 Cap by mouth two (2) times a day. torsemide 10 mg tablet Commonly known as:  DEMADEX Take 1 Tab by mouth daily. VITAMIN D3 1,000 unit Cap Generic drug:  cholecalciferol Take 1,000 Units by mouth daily. We Performed the Following AMB POC EKG ROUTINE W/ 12 LEADS, INTER & REP [55669 CPT(R)] LIPID PANEL [17048 CPT(R)] MAGNESIUM [id number] CPT(R)] Follow-up Instructions Return in about 3 months (around 1/3/2019), or if symptoms worsen or fail to improve. Patient Instructions Please follow-up in early January with Dr. Sarah Seymour Have your labs drawn today. If you have racing heart beat, palpitations, chest pain, or worsening shortness of breath then please call us. Call for abnormal bleeding. Introducing [facility]! Dear Quique Loyola: Thank you for requesting a Shoot it! account. Our records indicate that you already have an active Shoot it! account. You can access your account anytime at [email or web address] Did you know that you can access your hospital and ER discharge instructions at any time in Shoot it!? You can also review all of your test results from your hospital stay or ER visit. Additional Information If you have questions, please visit the Frequently Asked Questions section of the Shoot it! website at [email or web address] Remember, Shoot it! is NOT to be used for urgent needs. For medical emergencies, dial 911. Now available from your iPhone and Android! Please provide this summary of care documentation to your next provider. Your primary care clinician is listed as Sandhya Proper. If you have any questions after today's visit, please call [phone].

## 2018-10-03 NOTE — PROGRESS NOTES
Rikki Temple, JOSH  [address] [facility], [address] [facility]    Office [phone]  Fax [phone]        Tiffany Andres is a 80 y.o. female who is followed outpatient by Dr. William Diego and was last seen while inpatient 9/7/2018 to 9/12/2018. Patient is here today for follow-up of acute atrial flutter. Assessment  Encounter Diagnoses   Name Primary?  Atrial flutter, unspecified type ([facility] Yes    Chronic diastolic heart failure (HCC)     PSVT (paroxysmal supraventricular tachycardia) (HCC)     Venous insufficiency     Dyslipidemia     CKD (chronic kidney disease), stage III (HCC)     Atrial fibrillation, unspecified type ([facility]        Recommendations:    Atrial flutter, in NSR today  - newly diagnosed Sept 2018  - cont Toprol XL 50mg daily; BP low/nml just off Lisinopril,   - [id number] score is 4 -- cont Eliquis 5mg BID  - TSH abn, add'll labs / management per PCP    LE Edema, chronic  - appear mostly due to venous insufficiency   - Continue with leg elevation and reduced sodium intake   - has rx to obtain compression hose    - hx G1DD, Echo 9/10/18 - EF 60%, mild pulm HTN, IVC dilated.    - continue torsemide - symptoms stable today, monitor hm weights      SVT    - no recurrence of symptoms  - Tolerating Toprol XL      Dyslipidemia  - she declined statin in past  - getting labs today for PCP, will add lipid panel    Follow-up Disposition:  Return in about 3 months (around 1/3/2019), or if symptoms worsen or fail to improve. Subjective:    Tiffany Andres is a 80 y.o. female with past medical history significant for hypertension, PSVT, admitted 9/7/18-9/12/18 with cholecystitis now status post cholecystectomy.     Postoperatively she had atrial fibrillation/atrial flutter initially managed with IV Lopressor. Toprol increased to 75mg daily and started on anticoag with apixapan. Pt discharged to rehab on 9/26/18.   Reports she has been taking Toprol XL 50 mg daily at home as well as apixaban 5 mg twice daily. Denies abnormal bleeding denies chest pain, palpitations, racing heartbeat, shortness of breath, and dizziness\lightheadedness. States blood pressures were elevated in rehab facility and patient was started on lisinopril. Developed dry cough and primary care discontinued lisinopril the other day. Is doing well at home ambulating with a walker. Feels able to do ADLs; has plan to leave home for an extended stay with her daughter starting next week until after the holidays. Continues to have lower extremity swelling which is stable and improved in the a.m. Has prescription for compression hose which she plans to fill this week. Denies new shortness of breath, PND and orthopnea. Cardiac testing  Echo 2011 - EF 65%, mild LVH, mild PA HTN  Echo 5/2012 - EF 55 % to 60 %. There were no regional wall motion abnormalities. Echo 7/31/14 - EF 60 % to 65 %.grade 1 diastolic dysfunction, mild TR,    Echo 9/10/18 - EF 60%, suboptimal visulization of wall motion, nml RV, mild pulm HTN, IVC dilated. ECG 10/4/2018 - NSR, RBBB, PVC    Past Medical History:   Diagnosis Date    (HFpEF) heart failure with preserved ejection fraction ([address] 05/22/2012    first presented 2012 in the setting of sepsis    Anemia     hx b12 def age 22-31s    Atrial fibrillation ([address] 2018    Cholecystitis 2018    COPD (chronic obstructive pulmonary disease) ([facility] [facility] 7/2011    FEV1 1 L 7/2011. Dr. Hope Arambula GERD (gastroesophageal reflux disease)     Glaucoma suspect of both eyes     Dr Carmen Bell    Hemorrhoids     Migraine     Mixed stress and urge urinary incontinence     OA (osteoarthritis) of knee     R, Dr. Jackie Manriquez.   Euflexa injections x3. has walker    Obesity (BMI 30.0-34.9) 9/7/2018    Osteoporosis     declines medication tx    Other postablative hypothyroidism 1987 RAI 1987  saw Dr. Ibrahima Pickett Paresthesia of foot, bilateral     Psoriasis     left ear    PSVT (paroxysmal supraventricular tachycardia) ([facility] 5/19/2012    Dr. Rah Medina Pulmonary nodules 7/2011    RLL x2.  stable 2/2012  Dr. 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 60 Tab 0    Vit A,C,E-Zinc-Copper (PRESERVISION AREDS) cap capsule Take 1 Cap by mouth two (2) times a day.  metoprolol succinate (TOPROL-XL) 50 mg XL tablet Take 50 mg by mouth daily (after lunch).  levothyroxine (SYNTHROID) 150 mcg tablet Take 150 mcg by mouth six (6) days a week. Monday - Saturday = 150 mcg  Sunday = 75 mcg (1/2 of a 150 mcg tablet)      torsemide (DEMADEX) 10 mg tablet Take 1 Tab by mouth daily. 90 Tab 3    cholecalciferol (VITAMIN D3) 1,000 unit cap Take 1,000 Units by mouth daily.  albuterol (PROVENTIL HFA) 90 mcg/actuation inhaler Take 2 Puffs by inhalation every four (4) hours as needed for Wheezing. 1 Inhaler 5    aspirin delayed-release (ASPIR-81) 81 mg tablet Take 81 mg by mouth daily. Allergies   Allergen Reactions    Ciprofloxacin (Bulk) Rash    Diltiazem Rash    Piperacillin Other (comments)     Appears to have tolerated 5/13/12-5/23/12          Review of Systems  Constitutional: Negative for fever, chills, malaise/fatigue and diaphoresis. Respiratory: Negative hemoptysis, sputum production, shortness of breath and wheezing. Positive dry cough  Cardiovascular: Negative for chest pain, palpitations, orthopnea, claudication, and PND. Positive leg swelling  Gastrointestinal: Negative for heartburn, nausea, vomiting, blood in stool and melena. Genitourinary: Negative for dysuria and flank pain. Neurological: Negative for focal weakness, loss of consciousness, weakness and headaches.   Endo/Heme/Allergies: Negative for abnormal bleeding  Psychiatric/Behavioral: Negative for memory loss. Negative for insomnia      Physical Exam    Visit Vitals    /64 (BP 1 Location: Right arm, BP Patient Position: Sitting)    Pulse 75    Resp 12    Ht 5' 10\" (1.778 m)    Wt 218 lb (98.9 kg)    SpO2 98%    BMI 31.28 kg/m2     Wt Readings from Last 3 Encounters:   10/03/18 218 lb (98.9 kg)   10/01/18 218 lb (98.9 kg)   09/26/18 219 lb 5.7 oz (99.5 kg)      General - well developed well nourished, walks with walker  Neck - JVP normal,   Cardiac - normal S1, S2, no murmurs, rubs or gallops.  No clicks  Vascular - radials and pedal pulses equal bilateral  Lungs - diminished left side but clear to auscultation bilaterally, no rales, wheezing or rhonchi  Abd - soft nontender, nondistended, positive bowel sounds   extremities -trace pedal edema bilaterally, slightly cool   skin - no rash  Neuro - nonfocal  Psych - normal mood and affect    Labs:     Lab Results   Component Value Date/Time    Hemoglobin A1c 5.3 05/12/2012 05:00 AM     Lab Results   Component Value Date/Time    Cholesterol, total 207 (H) 11/17/2016 02:09 PM    Cholesterol, total 256 (H) 12/16/2015 03:05 PM    Cholesterol, total 199 07/11/2011 03:04 AM    Cholesterol, Total 254 (H) 07/16/2013 01:39 PM    HDL Cholesterol 63 11/17/2016 02:09 PM    HDL Cholesterol 72 12/16/2015 03:05 PM    HDL Cholesterol 74 07/11/2011 03:04 AM    LDL, calculated 124 (H) 11/17/2016 02:09 PM    LDL, calculated 156 (H) 12/16/2015 03:05 PM    LDL, calculated 109 (H) 07/11/2011 03:04 AM    Triglyceride 102 11/17/2016 02:09 PM    Triglyceride 141 12/16/2015 03:05 PM    Triglyceride 78 07/11/2011 03:04 AM     Component      Latest Ref Rng & Units 9/12/2018 9/11/2018 9/11/2018 9/11/2018           6:01 AM  3:50 AM  3:50 AM  3:50 AM   Sodium      136 - 145 mmol/L 142      Potassium      3.5 - 5.1 mmol/L 4.4      Chloride      97 - 108 mmol/L 107      CO2      21 - 32 mmol/L 28      Anion gap      5 - 15 mmol/L 7 Glucose      65 - 100 mg/dL 97      BUN      6 - 20 MG/DL 15      Creatinine      0.55 - 1.02 MG/DL 0.92      BUN/Creatinine ratio      12 - 20   16      GFR est AA      >60 ml/min/1.73m2 >60      GFR est non-AA      >60 ml/min/1.73m2 58 (L)      Calcium      8.5 - 10.1 MG/DL 9.0      Bilirubin, total      0.2 - 1.0 MG/DL       ALT (SGPT)      12 - 78 U/L       AST      15 - 37 U/L       Alk. phosphatase      45 - 117 U/L       Protein, total      6.4 - 8.2 g/dL       Albumin      3.5 - 5.0 g/dL       Globulin      2.0 - 4.0 g/dL       A-G Ratio      1.1 - 2.2         TSH      0.36 - 3.74 uIU/mL    0.05 (L)   T4, Free      0.8 - 1.5 NG/DL   1.9 (H)    Free Triiodothyronine (T3)      2.2 - 4.0 pg/mL  1.6 (L)       Component      Latest Ref Rng & Units 9/11/2018           3:50 AM   Sodium      136 - 145 mmol/L 142   Potassium      3.5 - 5.1 mmol/L 4.5   Chloride      97 - 108 mmol/L 108   CO2      21 - 32 mmol/L 28   Anion gap      5 - 15 mmol/L 6   Glucose      65 - 100 mg/dL 95   BUN      6 - 20 MG/DL 20   Creatinine      0.55 - 1.02 MG/DL 1.05 (H)   BUN/Creatinine ratio      12 - 20   19   GFR est AA      >60 ml/min/1.73m2 >60   GFR est non-AA      >60 ml/min/1.73m2 50 (L)   Calcium      8.5 - 10.1 MG/DL 8.9   Bilirubin, total      0.2 - 1.0 MG/DL 0.4   ALT (SGPT)      12 - 78 U/L 22   AST      15 - 37 U/L 17   Alk.  phosphatase      45 - 117 U/L 175 (H)   Protein, total      6.4 - 8.2 g/dL 5.9 (L)   Albumin      3.5 - 5.0 g/dL 2.1 (L)   Globulin      2.0 - 4.0 g/dL 3.8   A-G Ratio      1.1 - 2.2   0.6 (L)   TSH      0.36 - 3.74 uIU/mL    T4, Free      0.8 - 1.5 NG/DL    Free Triiodothyronine (T3)      2.2 - 4.0 pg/mL          JOSH Vance

## 2018-10-09 LAB
25(OH)D3+25(OH)D2 SERPL-MCNC: 34.8 NG/ML (ref 30–100)
BUN SERPL-MCNC: 26 MG/DL (ref 8–27)
BUN/CREAT SERPL: 21 (ref 12–28)
CALCIUM SERPL-MCNC: 9.4 MG/DL (ref 8.7–10.3)
CHLORIDE SERPL-SCNC: 103 MMOL/L (ref 96–106)
CHOLEST SERPL-MCNC: 217 MG/DL (ref 100–199)
CO2 SERPL-SCNC: 20 MMOL/L (ref 20–29)
CREAT SERPL-MCNC: 1.26 MG/DL (ref 0.57–1)
ERYTHROCYTE [DISTWIDTH] IN BLOOD BY AUTOMATED COUNT: 14.8 % (ref 12.3–15.4)
GLUCOSE SERPL-MCNC: 93 MG/DL (ref 65–99)
HCT VFR BLD AUTO: 39.2 % (ref 34–46.6)
HDLC SERPL-MCNC: 62 MG/DL
HGB BLD-MCNC: 12.4 G/DL (ref 11.1–15.9)
INTERPRETATION, 910389: NORMAL
INTERPRETATION: NORMAL
LDLC SERPL CALC-MCNC: 135 MG/DL (ref 0–99)
MAGNESIUM SERPL-MCNC: 2.3 MG/DL (ref 1.6–2.3)
MCH RBC QN AUTO: 29.8 PG (ref 26.6–33)
MCHC RBC AUTO-ENTMCNC: 31.6 G/DL (ref 31.5–35.7)
MCV RBC AUTO: 94 FL (ref 79–97)
PDF IMAGE, 910387: NORMAL
PLATELET # BLD AUTO: 372 X10E3/UL (ref 150–379)
POTASSIUM SERPL-SCNC: 4.8 MMOL/L (ref 3.5–5.2)
RBC # BLD AUTO: 4.16 X10E6/UL (ref 3.77–5.28)
SODIUM SERPL-SCNC: 143 MMOL/L (ref 134–144)
T4 FREE SERPL-MCNC: 1.96 NG/DL (ref 0.82–1.77)
TRIGL SERPL-MCNC: 99 MG/DL (ref 0–149)
TSH SERPL DL<=0.005 MIU/L-ACNC: 0.63 UIU/ML (ref 0.45–4.5)
VLDLC SERPL CALC-MCNC: 20 MG/DL (ref 5–40)
WBC # BLD AUTO: 6.4 X10E3/UL (ref 3.4–10.8)

## 2018-10-09 RX ORDER — LEVOTHYROXINE SODIUM 150 UG/1
150 TABLET ORAL
Qty: 90 TAB | Refills: 1
Start: 2018-10-09 | End: 2019-04-22 | Stop reason: SDUPTHER

## 2018-10-16 ENCOUNTER — DOCUMENTATION ONLY (OUTPATIENT)
Dept: CARDIOLOGY CLINIC | Age: 83
End: 2018-10-16

## 2018-10-25 RX ORDER — CEFDINIR 300 MG/1
300 CAPSULE ORAL 2 TIMES DAILY
Qty: 20 CAP | Refills: 0 | Status: SHIPPED | OUTPATIENT
Start: 2018-10-25 | End: 2018-10-25 | Stop reason: SDUPTHER

## 2018-10-25 RX ORDER — CEFDINIR 300 MG/1
300 CAPSULE ORAL 2 TIMES DAILY
Qty: 20 CAP | Refills: 0 | Status: SHIPPED | OUTPATIENT
Start: 2018-10-25 | End: 2018-11-04

## 2018-10-26 RX ORDER — BENZONATATE 200 MG/1
200 CAPSULE ORAL
Qty: 21 CAP | Refills: 0 | Status: SHIPPED | OUTPATIENT
Start: 2018-10-26 | End: 2018-11-02

## 2019-01-07 RX ORDER — APIXABAN 5 MG/1
TABLET, FILM COATED ORAL
Qty: 60 TAB | Refills: 1 | Status: SHIPPED | OUTPATIENT
Start: 2019-01-07 | End: 2019-01-10 | Stop reason: SDUPTHER

## 2019-01-10 ENCOUNTER — OFFICE VISIT (OUTPATIENT)
Dept: CARDIOLOGY CLINIC | Age: 84
End: 2019-01-10

## 2019-01-10 VITALS
HEART RATE: 62 BPM | BODY MASS INDEX: 30.64 KG/M2 | HEIGHT: 70 IN | SYSTOLIC BLOOD PRESSURE: 126 MMHG | WEIGHT: 214 LBS | DIASTOLIC BLOOD PRESSURE: 80 MMHG

## 2019-01-10 DIAGNOSIS — I87.2 VENOUS INSUFFICIENCY: ICD-10-CM

## 2019-01-10 DIAGNOSIS — I48.0 PAF (PAROXYSMAL ATRIAL FIBRILLATION) (HCC): Primary | ICD-10-CM

## 2019-01-10 NOTE — PROGRESS NOTES
Ketan Morrissey MD. [facility]              Patient: Edwin Deleon  : 1932      Today's Date: 1/10/2019            HISTORY OF PRESENT ILLNESS:     History of Present Illness:  Here for follow-up. She is doing well overall. No CP or SOB          PAST MEDICAL HISTORY:     Past Medical History:   Diagnosis Date    (HFpEF) heart failure with preserved ejection fraction ([address] 2012    first presented  in the setting of sepsis    Anemia     hx b12 def age 22-31s    Atrial fibrillation ([address] 2018    Cholecystitis 2018    COPD (chronic obstructive pulmonary disease) ([address] 2011    FEV1 1 L 2011. Dr. Jaci Cutler GERD (gastroesophageal reflux disease)     Glaucoma suspect of both eyes     Dr Lee Campuzano    Hemorrhoids     Migraine     Mixed stress and urge urinary incontinence     OA (osteoarthritis) of knee     R, Dr. Mata Skelton. Euflexa injections x3. has walker    Obesity (BMI 30.0-34.9) 2018    Osteoporosis     declines medication tx    Other postablative hypothyroidism     ROSAS   saw Dr. Carrillo Avalos Paresthesia of foot, bilateral     Psoriasis     left ear    PSVT (paroxysmal supraventricular tachycardia) ([facility] 2012    Dr. Elizabeth Waggoner Pulmonary nodules 2011    RLL x2.  stable 2012  Dr. 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  Dr. Jess Nix    menorrhagia    HX OTHER SURGICAL      Echo 14 - LVEF 60-65%, grade 1 DD    HX TONSILLECTOMY      NM CARDIAC SPECT W STRS/REST MULT 11    small sized fixed defect in inferolateral wall may represent scar or abd attenuation; EF 80%    VAS LOWER EXT ART PVR MULT LEVEL SEG PRESSURES  11    normal           MEDICATIONS:     Current Outpatient Medications   Medication Sig Dispense Refill    ELIQUIS 5 mg tablet TAKE 1 TABLET BY MOUTH TWICE A DAY 60 Tab 1    levothyroxine (SYNTHROID) 150 mcg tablet Take 1 Tab by mouth six (6) days a week. Do not take on Sundays (decrease 10/9/18) 90 Tab 1    Vit A,C,E-Zinc-Copper (PRESERVISION AREDS) cap capsule Take 1 Cap by mouth two (2) times a day.  metoprolol succinate (TOPROL-XL) 50 mg XL tablet Take 50 mg by mouth daily (after lunch).  torsemide (DEMADEX) 10 mg tablet Take 1 Tab by mouth daily. 90 Tab 3    cholecalciferol (VITAMIN D3) 1,000 unit cap Take 1,000 Units by mouth daily.  albuterol (PROVENTIL HFA) 90 mcg/actuation inhaler Take 2 Puffs by inhalation every four (4) hours as needed for Wheezing. 1 Inhaler 5    aspirin delayed-release (ASPIR-81) 81 mg tablet Take 81 mg by mouth daily.          Allergies   Allergen Reactions    Ciprofloxacin (Bulk) Rash    Diltiazem Rash    Piperacillin Other (comments)     Appears to have tolerated 12-12           SOCIAL HISTORY:     Social History     Tobacco Use    Smoking status: Former Smoker     Packs/day: 1.00     Years: 60.00     Pack years: 60.00     Types: Cigarettes     Last attempt to quit: 2011     Years since quittin.3    Smokeless tobacco: Never Used   Substance Use Topics    Alcohol use: No     Alcohol/week: 0.0 oz    Drug use: No         FAMILY HISTORY:     Family History   Problem Relation Age of Onset    Cancer Maternal Grandmother         breast    Cancer Sister         lymphopma    Heart Disease Father     Heart Disease Paternal Grandfather     Seizures Sister     Arthritis-osteo Mother     Dementia Mother     Malignant Hyperthermia Neg Hx     Pseudocholinesterase Deficiency Neg Hx     Delayed Awakening Neg Hx     Post-op Nausea/Vomiting Neg Hx     Emergence Delirium Neg Hx     Post-op Cognitive Dysfunction Neg Hx     Other Neg Hx             REVIEW OF SYSTEMS:       Review of Systems:    Constitutional: Negative for fever, chills    HEENT: Negative for vision changes.    Respiratory: Negative for cough    Cardiovascular: Negative for orthopnea, syncope, and PND.    Gastrointestinal: Negative for abdominal pain, diarrhea, or melena    Genitourinary: Negative for dysuria  + urinary incontinence at times   Musculoskeletal: Negative for myalgias. + joint pain    Skin: Negative for rash    Heme: No problems bleeding.    Neurological: + leg neuropathy               PHYSICAL EXAM:       Physical Exam:   Visit Vitals  /80   Pulse 62   Ht 5' 10\" (1.778 m)   Wt 214 lb (97.1 kg)   BMI 30.71 kg/m²       Patient appears generally well, mood and affect are appropriate and pleasant.    HEENT: Normocephalic, atraumatic.    Neck Exam: Supple, No JVD  Lung Exam: Clear to auscultation, even breath sounds.    Cardiac Exam: Regular rate and rhythm with no murmur  Abdomen: Soft, non-tender, normal bowel sounds. + obese  Extremities: trace bilat ankle edema.   MAW  Neuro - non focal   Psych - appropriate affect           LABS / OTHER STUDIES:       Lab Results   Component Value Date/Time    Sodium 143 10/03/2018 12:01 PM    Potassium 4.8 10/03/2018 12:01 PM    Chloride 103 10/03/2018 12:01 PM    CO2 20 10/03/2018 12:01 PM    Anion gap 7 09/12/2018 06:01 AM    Glucose 93 10/03/2018 12:01 PM    BUN 26 10/03/2018 12:01 PM    Creatinine 1.26 (H) 10/03/2018 12:01 PM    BUN/Creatinine ratio 21 10/03/2018 12:01 PM    GFR est AA 45 (L) 10/03/2018 12:01 PM    GFR est non-AA 39 (L) 10/03/2018 12:01 PM    Calcium 9.4 10/03/2018 12:01 PM    Bilirubin, total 0.4 09/11/2018 03:50 AM    AST (SGOT) 17 09/11/2018 03:50 AM    Alk.  phosphatase 175 (H) 09/11/2018 03:50 AM    Protein, total 5.9 (L) 09/11/2018 03:50 AM    Albumin 2.1 (L) 09/11/2018 03:50 AM    Globulin 3.8 09/11/2018 03:50 AM    A-G Ratio 0.6 (L) 09/11/2018 03:50 AM    ALT (SGPT) 22 09/11/2018 03:50 AM       Lab Results   Component Value Date/Time    WBC 6.4 10/03/2018 12:01 PM    Hemoglobin (POC) 11.2 (L) 05/11/2012 09:58 PM    HGB 12.4 10/03/2018 12:01 PM    Hematocrit (POC) 33 (L) 05/11/2012 09:58 PM    HCT 39.2 10/03/2018 12:01 PM    PLATELET 659 80/88/4252 12:01 PM    MCV 94 10/03/2018 12:01 PM       Lab Results   Component Value Date/Time    Cholesterol, total 217 (H) 10/03/2018 12:01 PM    HDL Cholesterol 62 10/03/2018 12:01 PM    LDL, calculated 135 (H) 10/03/2018 12:01 PM    VLDL, calculated 20 10/03/2018 12:01 PM    Triglyceride 99 10/03/2018 12:01 PM                    CARDIAC DIAGNOSTICS:       Cardiac Evaluation Includes:  Echo 2011 - EF 65%, mild LVH, mild PA HTN  Echo 5/2012 - EF 55 % to 60 %. There were no regional wall motion abnormalities. Echo 7/31/14 - EF 60 % to 65 %.grade 1 diastolic dysfunction, mild TR,  Echo 9/10/18 - EF 60%, suboptimal visulization of wall motion, nml RV, mild pulm HTN, IVC dilated.      EKG 8/8/18 - Afib, , RBBB  EKG 9/9/18 - Atrial flutter, RBBB, HR 96  ECG 10/3/2018 - NSR, RBBB, PVC             ASSESSMENT AND PLAN:       Assessment and Plan:    1) Afib / Atrial flutter  - newly diagnosed 9/7/18 when admitted with acute anam. TSH was also low then. - Is back in NSR on 10/3/18   - Given her risk of recurrent afib and high [id number] score of 4, I think long term [address] would be best   - continue BB and Eliquis     2) Chronic LE Edema    - Has chronic complaints    - Problems due to Venous Insufficiency most likely  - Echo 7/31/14 - LVEF 60-65%, grade 1 DD  - I stopped verapamil in past and instead tried a beta-blocker (Toprol XL 50 mg daily), with mild improvement in symptoms    - Continue with leg elevation.  Encourage support hose.     - Continue diuretic (torsemide) - stable symptoms         3) Chronic GARCIA    - due to COPD.    - Prior echo and stress test without major abnormalities.     4) Dyslipidemia  - she declines statin   - At age > 71, benefit of statin for primary prevention is not clear       5) See me back in one year. Patient expressed understanding of the plan - questions were answered. Sister has Vipin Saver a daughter in [place]; step-daughter close by. 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   Lisa Richardson MD, [address], [facility] [address]      [address] [place] [address] Drive.  [address] [place] [address], [facility]. Kelsi Araiza.                 [place], [address] [place] [address]  Ph: [phone]                               Ph [phone]

## 2019-04-22 RX ORDER — LEVOTHYROXINE SODIUM 150 MCG
TABLET ORAL
Qty: 90 TAB | Refills: 3 | Status: SHIPPED | OUTPATIENT
Start: 2019-04-22 | End: 2019-09-19

## 2019-05-08 ENCOUNTER — OFFICE VISIT (OUTPATIENT)
Dept: INTERNAL MEDICINE CLINIC | Age: 84
End: 2019-05-08

## 2019-05-08 ENCOUNTER — HOSPITAL ENCOUNTER (OUTPATIENT)
Dept: LAB | Age: 84
Discharge: HOME | End: 2019-05-08
Payer: MEDICARE

## 2019-05-08 VITALS
HEART RATE: 64 BPM | DIASTOLIC BLOOD PRESSURE: 83 MMHG | RESPIRATION RATE: 18 BRPM | BODY MASS INDEX: 31.35 KG/M2 | SYSTOLIC BLOOD PRESSURE: 141 MMHG | OXYGEN SATURATION: 95 % | WEIGHT: 219 LBS | HEIGHT: 70 IN | TEMPERATURE: 98 F

## 2019-05-08 DIAGNOSIS — N18.30 CKD (CHRONIC KIDNEY DISEASE), STAGE III (HCC): ICD-10-CM

## 2019-05-08 DIAGNOSIS — G57.93 NEUROPATHY OF BOTH FEET: ICD-10-CM

## 2019-05-08 DIAGNOSIS — Z00.00 MEDICARE ANNUAL WELLNESS VISIT, SUBSEQUENT: Primary | ICD-10-CM

## 2019-05-08 DIAGNOSIS — N39.46 MIXED STRESS AND URGE URINARY INCONTINENCE: ICD-10-CM

## 2019-05-08 DIAGNOSIS — I50.32 CHRONIC DIASTOLIC HEART FAILURE (HCC): ICD-10-CM

## 2019-05-08 DIAGNOSIS — I48.0 PAF (PAROXYSMAL ATRIAL FIBRILLATION) (HCC): ICD-10-CM

## 2019-05-08 DIAGNOSIS — E03.9 HYPOTHYROIDISM, UNSPECIFIED TYPE: ICD-10-CM

## 2019-05-08 PROCEDURE — 82607 VITAMIN B-12: CPT

## 2019-05-08 PROCEDURE — 84443 ASSAY THYROID STIM HORMONE: CPT

## 2019-05-08 PROCEDURE — 36415 COLL VENOUS BLD VENIPUNCTURE: CPT

## 2019-05-08 PROCEDURE — 84439 ASSAY OF FREE THYROXINE: CPT

## 2019-05-08 PROCEDURE — 84165 PROTEIN E-PHORESIS SERUM: CPT

## 2019-05-08 PROCEDURE — 80053 COMPREHEN METABOLIC PANEL: CPT

## 2019-05-08 RX ORDER — TOLTERODINE TARTRATE 2 MG/1
2 TABLET, EXTENDED RELEASE ORAL 2 TIMES DAILY
Qty: 60 TAB | Refills: 2 | Status: SHIPPED | OUTPATIENT
Start: 2019-05-08 | End: 2019-07-29 | Stop reason: SDUPTHER

## 2019-05-08 NOTE — ACP (ADVANCE CARE PLANNING)
Advance Care Planning (ACP) Provider Note - Comprehensive     Date of ACP Conversation: 05/08/19  Persons included in Conversation:  patient  Length of ACP Conversation in minutes:  <16 minutes (Non-Billable)    Authorized Decision Maker (if patient is incapable of making informed decisions): This person is:  Healthcare Agent/Medical Power of  under Advance Directive          General ACP for ALL Patients with Decision Making Capacity:   Importance of advance care planning, including choosing a healthcare agent to communicate patient's healthcare decisions if patient lost the ability to make decisions, such as after a sudden illness or accident  Understanding of the healthcare agent role was assessed and information provided  Exploration of values, goals, and preferences if recovery is not expected, even with continued medical treatment in the event of: Imminent death  Severe, permanent brain injury    Review of Existing Advance Directive:  not availble     For Serious or Chronic Illness:  Understanding of medical condition    Understanding of CPR, goals and expected outcomes, benefits and burdens discussed. Information on CPR success rates provided (e.g. for CPR in hospital, survival to d/c at two weeks is 22%, for chronically ill or elderly/frail survival is less than 3%); Individual asked to communicate understanding of information in his/her own words.   Explored fears and concerns regarding CPR or possible outcomes    Interventions Provided:  Recommended completion of Advance Directive form after review of ACP materials and conversation with prospective healthcare agent   Recommended communicating the plan and making copies for the healthcare agent, personal physician, and others as appropriate (e.g., health system)

## 2019-05-08 NOTE — PATIENT INSTRUCTIONS
Body Mass Index: Care Instructions Your Care Instructions Body mass index (BMI) can help you see if your weight is raising your risk for health problems. It uses a formula to compare how much you weigh with how tall you are. · A BMI lower than 18.5 is considered underweight. · A BMI between 18.5 and 24.9 is considered healthy. · A BMI between 25 and 29.9 is considered overweight. A BMI of 30 or higher is considered obese. If your BMI is in the normal range, it means that you have a lower risk for weight-related health problems. If your BMI is in the overweight or obese range, you may be at increased risk for weight-related health problems, such as high blood pressure, heart disease, stroke, arthritis or joint pain, and diabetes. If your BMI is in the underweight range, you may be at increased risk for health problems such as fatigue, lower protection (immunity) against illness, muscle loss, bone loss, hair loss, and hormone problems. BMI is just one measure of your risk for weight-related health problems. You may be at higher risk for health problems if you are not active, you eat an unhealthy diet, or you drink too much alcohol or use tobacco products. Follow-up care is a key part of your treatment and safety. Be sure to make and go to all appointments, and call your doctor if you are having problems. It's also a good idea to know your test results and keep a list of the medicines you take. How can you care for yourself at home? · Practice healthy eating habits. This includes eating plenty of fruits, vegetables, whole grains, lean protein, and low-fat dairy. · If your doctor recommends it, get more exercise. Walking is a good choice. Bit by bit, increase the amount you walk every day. Try for at least 30 minutes on most days of the week. · Do not smoke. Smoking can increase your risk for health problems.  If you need help quitting, talk to your doctor about stop-smoking programs and medicines. These can increase your chances of quitting for good. · Limit alcohol to 2 drinks a day for men and 1 drink a day for women. Too much alcohol can cause health problems. If you have a BMI higher than 25 · Your doctor may do other tests to check your risk for weight-related health problems. This may include measuring the distance around your waist. A waist measurement of more than 40 inches in men or 35 inches in women can increase the risk of weight-related health problems. · Talk with your doctor about steps you can take to stay healthy or improve your health. You may need to make lifestyle changes to lose weight and stay healthy, such as changing your diet and getting regular exercise. If you have a BMI lower than 18.5 · Your doctor may do other tests to check your risk for health problems. · Talk with your doctor about steps you can take to stay healthy or improve your health. You may need to make lifestyle changes to gain or maintain weight and stay healthy, such as getting more healthy foods in your diet and doing exercises to build muscle. Where can you learn more? Go to http://jean-pierre-trice.info/. Enter S176 in the search box to learn more about \"Body Mass Index: Care Instructions. \" Current as of: October 13, 2016 Content Version: 11.4 © 1742-2083 [employer]. Care instructions adapted under license by [employer] (which disclaims liability or warranty for this information). If you have questions about a medical condition or this instruction, always ask your healthcare professional. [address] any warranty or liability for your use of this information.

## 2019-05-08 NOTE — PROGRESS NOTES
This is a Subsequent Medicare Annual Wellness Visit providing Personalized Prevention Plan Services (PPPS) (Performed 12 months after initial AWV and PPPS ) I have reviewed the patient's medical history in detail and updated the computerized patient record. Hypothyroidism follow-up Reports  fatigue 
denies heat/cold intolerance, bowel/skin changes or CVS symptoms, losing hair, feeling excessive energy, tremulousness, palpitations. Thyroid medication has been decreased since last medication check and labs. Lab Results Component Value Date/Time TSH 0.632 10/03/2018 12:01 PM  
 T4, Free 1.96 (H) 10/03/2018 12:01 PM  
 
Wt Readings from Last 3 Encounters:  
05/08/19 219 lb (99.3 kg) 01/10/19 214 lb (97.1 kg) 10/03/18 218 lb (98.9 kg) History Past Medical History:  
Diagnosis Date  (HFpEF) heart failure with preserved ejection fraction ([facility] 05/22/2012  
 first presented 2012 in the setting of sepsis  Anemia   
 hx b12 def age 22-31s  Atrial fibrillation ([facility] 2018  Cholecystitis 2018  COPD (chronic obstructive pulmonary disease) ([facility] 7/2011 FEV1 1 L 7/2011. Dr. Divya Hitchcock  GERD (gastroesophageal reflux disease)  Glaucoma suspect of both eyes Dr Alicia Clements  Hemorrhoids  Migraine  Mixed stress and urge urinary incontinence  OA (osteoarthritis) of knee R, Dr. Ramos Collado. Euflexa injections x3. has walker  Obesity (BMI 30.0-34.9) 9/7/2018  Osteoporosis   
 declines medication tx  
 Other postablative hypothyroidism 1987 RAI 1987  saw Dr. Karla Dueñas  Paresthesia of foot, bilateral   
 Psoriasis   
 left ear  PSVT (paroxysmal supraventricular tachycardia) ([facility] 5/19/2012 Dr. Maynor Magallon  Pulmonary nodules 7/2011 RLL x2.  stable 2/2012  Dr. 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  
Procedure Laterality Date  ECHO 2D ADULT  7/15/11  
 mild LVH, EF 65%, normal atrial sizes, indeterminate diastolic function, no sig valvular disease, RVSP 41  
 ECHO 2D ADULT  5/19/12  
 mild LVH, EF 55-60%  HX CATARACT REMOVAL Dr. Mary Cano, bilaterally  HX CHOLECYSTECTOMY  09/08/2018  HX COLONOSCOPY  6/28/12  
 2 polyps. Dr. Nam Sour  HX HYSTERECTOMY  1974  
 menorrhagia  HX OTHER SURGICAL Echo 7/31/14 - LVEF 60-65%, grade 1 DD  HX TONSILLECTOMY  NM CARDIAC SPECT W STRS/REST MULT  7/18/11  
 small sized fixed defect in inferolateral wall may represent scar or abd attenuation; EF 80%  VAS LOWER EXT ART PVR MULT LEVEL SEG PRESSURES  7/18/11  
 normal  
 
 
Current Outpatient Medications Medication Sig  tolterodine (DETROL) 2 mg tablet Take 1 Tab by mouth two (2) times a day. Indications: overactive bladder  SYNTHROID 150 mcg tablet TAKE 1 TABLET BY MOUTH 6 DAYS PER WEEK AND 1/2 TABLET ON SUNDAYS  apixaban (ELIQUIS) 5 mg tablet Take 1 Tab by mouth two (2) times a day.  Vit A,C,E-Zinc-Copper (PRESERVISION AREDS) cap capsule Take 1 Cap by mouth two (2) times a day.  metoprolol succinate (TOPROL-XL) 50 mg XL tablet Take 50 mg by mouth daily (after lunch).  torsemide (DEMADEX) 10 mg tablet Take 1 Tab by mouth daily.  cholecalciferol (VITAMIN D3) 1,000 unit cap Take 1,000 Units by mouth daily.  albuterol (PROVENTIL HFA) 90 mcg/actuation inhaler Take 2 Puffs by inhalation every four (4) hours as needed for Wheezing.  aspirin delayed-release (ASPIR-81) 81 mg tablet Take 81 mg by mouth daily. No current facility-administered medications for this visit. Allergies Allergen Reactions  Ciprofloxacin (Bulk) Rash  Diltiazem Rash  Piperacillin Other (comments) Appears to have tolerated 5/13/12-5/23/12 Family History Problem Relation Age of Onset  Cancer Maternal Grandmother   
     breast  
 Cancer Sister   
     lymphopma  Heart Disease Father  Heart Disease Paternal Grandfather  Seizures Sister Fidel Arthritis-osteo Mother  Dementia Mother  Malignant Hyperthermia Neg Hx  Pseudocholinesterase Deficiency Neg Hx  Delayed Awakening Neg Hx  Post-op Nausea/Vomiting Neg Hx  Emergence Delirium Neg Hx  Post-op Cognitive Dysfunction Neg Hx   
 Other Neg Hx   
 
 
 reports that she quit smoking about 7 years ago. Her smoking use included cigarettes. She has a 60.00 pack-year smoking history. She has never used smokeless tobacco. 
 reports that she does not drink alcohol. Depression Risk Factor Screening:  
 
 
Alcohol Risk Factor Screening: On any occasion during the past 3 months, have you had more than 3 drinks containing alcohol? No 
 
Do you average more than 14 drinks per week? No 
 
 
Functional Ability and Level of Safety:  
 
Hearing Loss  
mild Activities of Daily Living Self-care. Requires assistance with: no ADLs Fall Risk Fall Risk Assessment, last 12 mths 5/8/2019 Able to walk? Yes Fall in past 12 months? No  
Fall with injury? -  
 
 
 
Abuse Screen Patient is not abused Review of Systems A comprehensive review of systems was negative except for that written in the HPI. Physical Examination Evaluation of Cognitive Function: 
Mood/affect:  neutral, happy Appearance: age appropriate Family member/caregiver input: none Blood pressure 141/83, pulse 64, temperature 98 °F (36.7 °C), temperature source Oral, resp. rate 18, height 5' 10\" (1.778 m), weight 219 lb (99.3 kg), SpO2 95 %. General appearance: alert, cooperative, no distress, appears stated age Neck: supple, symmetrical, trachea midline, no adenopathy, thyroid: not enlarged, symmetric, no tenderness/mass/nodules, no carotid bruit and no JVD Lungs: clear to auscultation bilaterally Heart: regular rate and rhythm, S1, S2 normal, no murmur, click, rub or gallop Extremities: extremities normal, atraumatic, no cyanosis 1+ non-pittting edema Patient Care Team: 
Mey Galindo MD as PCP - General (Internal Medicine) Pam Staples MD (Cardiology) Advice/Referrals/Counseling Education and counseling provided. See below for specific orders Assessment/Plan Diagnoses and all orders for this visit: 
 
1. Medicare annual wellness visit, subsequent 2. Hypothyroidism, unspecified type 
based on my history, the overall control of this problem unclear 
-     TSH 3RD GENERATION 
-     T4, FREE 3. Chronic diastolic heart failure ([facility] Currently asymptomatic -     METABOLIC PANEL, COMPREHENSIVE 4. Neuropathy of both feet She would like more information about this. Check labs. Refer if she would like. Consider EMG or biopsy for small fiber neuropathy 
-     PROTEIN ELECTROPHORESIS W/ REFLX SRIKANTH 
-     VITAMIN B12 
-     REFERRAL TO NEUROLOGY 5. CKD (chronic kidney disease), stage III ([facility] stable 6. PAF (paroxysmal atrial fibrillation) ([facility] Currently asymptomatic Seeing Dr. George Her 
 
7. Mixed stress and urge urinary incontinence0 uncontrolled  
-  start    tolterodine (DETROL) 2 mg tablet; Take 1 Tab by mouth two (2) times a day. Indications: overactive bladder Potential medication side effects were discussed with the patient; let me know if any occur. Return for yearly Annual Wellness Visits Discussed the patient's BMI with her. The BMI follow up plan is as follows:  
 
dietary management education, guidance, and counseling 
encourage exercise 
monitor weight 
prescribed dietary intake An After Visit Summary was printed and given to the patient.

## 2019-05-10 LAB
ALBUMIN SERPL ELPH-MCNC: 3.3 G/DL (ref 2.9–4.4)
ALBUMIN SERPL-MCNC: 3.9 G/DL (ref 3.5–4.7)
ALBUMIN/GLOB SERPL: 1 {RATIO} (ref 0.7–1.7)
ALBUMIN/GLOB SERPL: 1.5 {RATIO} (ref 1.2–2.2)
ALP SERPL-CCNC: 114 IU/L (ref 39–117)
ALPHA1 GLOB SERPL ELPH-MCNC: 0.3 G/DL (ref 0–0.4)
ALPHA2 GLOB SERPL ELPH-MCNC: 0.8 G/DL (ref 0.4–1)
ALT SERPL-CCNC: 14 IU/L (ref 0–32)
AST SERPL-CCNC: 13 IU/L (ref 0–40)
B-GLOBULIN SERPL ELPH-MCNC: 1 G/DL (ref 0.7–1.3)
BILIRUB SERPL-MCNC: 0.3 MG/DL (ref 0–1.2)
BUN SERPL-MCNC: 16 MG/DL (ref 8–27)
BUN/CREAT SERPL: 15 (ref 12–28)
CALCIUM SERPL-MCNC: 9.6 MG/DL (ref 8.7–10.3)
CHLORIDE SERPL-SCNC: 102 MMOL/L (ref 96–106)
CO2 SERPL-SCNC: 27 MMOL/L (ref 20–29)
CREAT SERPL-MCNC: 1.1 MG/DL (ref 0.57–1)
GAMMA GLOB SERPL ELPH-MCNC: 1.1 G/DL (ref 0.4–1.8)
GLOBULIN SER CALC-MCNC: 2.6 G/DL (ref 1.5–4.5)
GLOBULIN SER CALC-MCNC: 3.2 G/DL (ref 2.2–3.9)
GLUCOSE SERPL-MCNC: 87 MG/DL (ref 65–99)
INTERPRETATION: NORMAL
M PROTEIN SERPL ELPH-MCNC: NORMAL G/DL
PLEASE NOTE, 011150: NORMAL
POTASSIUM SERPL-SCNC: 4.4 MMOL/L (ref 3.5–5.2)
PROT PATTERN SERPL ELPH-IMP: NORMAL
PROT SERPL-MCNC: 6.5 G/DL (ref 6–8.5)
SODIUM SERPL-SCNC: 144 MMOL/L (ref 134–144)
T4 FREE SERPL-MCNC: 1.57 NG/DL (ref 0.82–1.77)
TSH SERPL DL<=0.005 MIU/L-ACNC: 0.69 UIU/ML (ref 0.45–4.5)
VIT B12 SERPL-MCNC: 323 PG/ML (ref 232–1245)

## 2019-05-29 RX ORDER — METOPROLOL SUCCINATE 50 MG/1
TABLET, EXTENDED RELEASE ORAL
Qty: 90 TAB | Refills: 3 | Status: SHIPPED | OUTPATIENT
Start: 2019-05-29 | End: 2020-02-27 | Stop reason: SDUPTHER

## 2019-06-14 ENCOUNTER — OFFICE VISIT (OUTPATIENT)
Dept: NEUROLOGY | Age: 84
End: 2019-06-14

## 2019-06-14 VITALS
SYSTOLIC BLOOD PRESSURE: 126 MMHG | TEMPERATURE: 97.7 F | HEIGHT: 70 IN | RESPIRATION RATE: 18 BRPM | HEART RATE: 71 BPM | DIASTOLIC BLOOD PRESSURE: 72 MMHG | OXYGEN SATURATION: 97 % | BODY MASS INDEX: 32.64 KG/M2 | WEIGHT: 228 LBS

## 2019-06-14 DIAGNOSIS — G57.93 NEUROPATHIC PAIN OF BOTH FEET: Primary | ICD-10-CM

## 2019-06-14 RX ORDER — DULOXETIN HYDROCHLORIDE 20 MG/1
20 CAPSULE, DELAYED RELEASE ORAL DAILY
Qty: 30 CAP | Refills: 1 | Status: SHIPPED | OUTPATIENT
Start: 2019-06-14 | End: 2019-07-14

## 2019-06-14 NOTE — PROGRESS NOTES
Chief Complaint   Patient presents with    New Patient    Numbness     burning, redness, and tingling in both feet that sometimes goes up both legs.  for \"a good while\"

## 2019-06-14 NOTE — PATIENT INSTRUCTIONS
If you choose to go to imaging center outside of [facility], please be sure to bring imaging report and disc to follow up appointment. If we have ordered testing for you, know that; \"NO NEWS IS GOOD NEWS! \" It is our policy that we know longer call patients with results, nor do we  give test results over the phone. We schedule follow up appointments so that your results can be discussed in person. This allows you to address any questions you have regarding the results. If something of concern is revealed on your test, we will contact you to discuss the matter and if needed schedule a sooner follow up appointment. Additionally, results may be found by using the My Chart feature and one of our patient service representatives at the  can give you instructions on how to access this feature to utilize our electronic medical record system. Thank you for your understanding.

## 2019-06-14 NOTE — PROGRESS NOTES
Name:  Sathya Lazaro      :  1932    PCP:   Sophy Sequeira MD      Referring:  As above  MRN:   [id number]    Chief Complaint:   Chief Complaint   Patient presents with    New Patient    Numbness     burning, redness, and tingling in both feet that sometimes goes up both legs. for \"a good while\"       HISTORY OF PRESENT ILLNESS:     This is a 80 y.o. female who presents for evaluation of numbness in feet, lower legs. She describes that in  she was admitted at [facility] for dehydration/ sepsis, then went to rehab. She thinks she started to have numbness in feet around . She describes burning from toes up to mid-shin, tingling in feet up to ankles. She's not diabetic, never had back surgery, denies any chronic lower back pains, no pains radiating down either leg. She has chronic intermittent swelling of lower legs for 10-15 years. She had recent labs done with Dr Malcolm Martinez PCP. SPEP, B12, TSH, FT4 were normal.  CMP shows mild renal impairment with CR 1.10 and GFR 46 consistent with CKD 3a. Complete Review of Systems: reviewed on intake H&P; refer to media section; otherwise as noted in HPI     Allergies   Allergen Reactions    Ciprofloxacin (Bulk) Rash    Diltiazem Rash    Piperacillin Other (comments)     Appears to have tolerated 12-12     Past Medical History:   Diagnosis Date    (HFpEF) heart failure with preserved ejection fraction ([address] 2012    first presented  in the setting of sepsis    Anemia     hx b12 def age 22-31s    Atrial fibrillation ([address] 2018    Cholecystitis 2018    COPD (chronic obstructive pulmonary disease) ([address] 2011    FEV1 1 L 2011. Dr. Will Duncan GERD (gastroesophageal reflux disease)     Glaucoma suspect of both eyes     Dr Casey Fowler    Hemorrhoids     Migraine     Mixed stress and urge urinary incontinence     OA (osteoarthritis) of knee     R, Dr. Chantell Salinas. Euflexa injections x3. has walker    Obesity (BMI 30.0-34. 9) 9/7/2018    Osteoporosis     declines medication tx    Other postablative hypothyroidism 1987    ROSAS 1987  saw Dr. Emily Gastelum Paresthesia of foot, bilateral     Psoriasis     left ear    PSVT (paroxysmal supraventricular tachycardia) ([facility] 5/19/2012    Dr. Cherrie Garcia Pulmonary nodules 7/2011    RLL x2.  stable 2/2012  Dr. 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 For neuropathy feet pain 30 Cap 1    metoprolol succinate (TOPROL-XL) 50 mg XL tablet TAKE 1 TABLET BY MOUTH EVERY DAY 90 Tab 3    tolterodine (DETROL) 2 mg tablet Take 1 Tab by mouth two (2) times a day. Indications: overactive bladder 60 Tab 2    SYNTHROID 150 mcg tablet TAKE 1 TABLET BY MOUTH 6 DAYS PER WEEK AND 1/2 TABLET ON SUNDAYS 90 Tab 3    apixaban (ELIQUIS) 5 mg tablet Take 1 Tab by mouth two (2) times a day. 180 Tab 3    Vit A,C,E-Zinc-Copper (PRESERVISION AREDS) cap capsule Take 1 Cap by mouth two (2) times a day.  torsemide (DEMADEX) 10 mg tablet Take 1 Tab by mouth daily. 90 Tab 3    cholecalciferol (VITAMIN D3) 1,000 unit cap Take 1,000 Units by mouth daily.  albuterol (PROVENTIL HFA) 90 mcg/actuation inhaler Take 2 Puffs by inhalation every four (4) hours as needed for Wheezing. 1 Inhaler 5    aspirin delayed-release (ASPIR-81) 81 mg tablet Take 81 mg by mouth daily. PMHx:   Past Medical History:   Diagnosis Date    (HFpEF) heart failure with preserved ejection fraction ([facility] 05/22/2012    first presented 2012 in the setting of sepsis    Anemia     hx b12 def age 22-31s    Atrial fibrillation ([facility] 2018    Cholecystitis 2018    [facility] 7/2011    FEV1 1 L 7/2011.  Dr. Dot Ponce GERD (gastroesophageal reflux disease)     Glaucoma suspect of both eyes     Dr Bailee Almanza    Hemorrhoids     Migraine     Mixed stress and urge urinary incontinence     OA (osteoarthritis) of knee     R, Dr. Gely Moss. Euflexa injections x3. has walker    Obesity (BMI 30.0-34.9) 9/7/2018    Osteoporosis     declines medication tx    Other postablative hypothyroidism 1987    ROSAS 1987  saw Dr. Kentrell Li Paresthesia of foot, bilateral     Psoriasis     left ear    PSVT (paroxysmal supraventricular tachycardia) ([address] 5/19/2012    Dr. Kelli Whittington Pulmonary nodules 7/2011    RLL x2.  stable 2/2012  Dr. 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 SocHx:  reports that she quit smoking about 7 years ago. Her smoking use included cigarettes. She has a 60.00 pack-year smoking history. She has never used smokeless tobacco. She reports that she does not drink alcohol or use drugs. FHx: family history includes Arthritis-osteo in her mother; Cancer in her maternal grandmother and sister; Dementia in her mother; Heart Disease in her father and paternal grandfather; Seizures in her sister. PHYSICAL EXAM  Vitals:    06/14/19 1129   BP: 126/72   BP 1 Location: Left arm   BP Patient Position: Sitting   Pulse: 71   Resp: 18   Temp: 97.7 °F (36.5 °C)   TempSrc: Oral   SpO2: 97%   Weight: 103.4 kg (228 lb)   Height: 5' 10\" (1.778 m)       General:  Alert, cooperative, NAD   Head:  Normocephalic, atraumatic.    Eyes:  Conjunctivae/corneas clear   Lungs:  Heart:  Non labored breathing  Regular rate, rhythm   Extremities: No edema.   Skin: No rashes    Neurologic Exam       Language: normal  Memory:  Alert, oriented to person, place, situation    Cranial Nerves:  I: smell Not tested   II: visual fields Full to confrontation   II: pupils Equal, round, reactive to light   II: optic disc Not examined, not relevant   III,VII: ptosis none   III,IV,VI: extraocular muscles  normal   V: facial light touch sensation  normal   VII: facial muscle function  symmetric   VIII: hearing symmetric   IX: soft palate elevation  Not examined, not relevant   XI: sternocleidomastoid strength 5/5   XII: tongue  Not examined, not relevant      Motor: normal bulk, tone, strength in all exts  Sensory: intact to LT, PP, temp, vibration x 4 exts   Cerebellar: no rest, postural, or intention tremor  Normal FNF and H-Shin bilaterally  Reflexes: 2+ throughout  Plantar response: neutral bilaterally    Gait: normal   Romberg negative       ASSESSMENT AND PLAN    ICD-10-CM ICD-9-CM    1. Neuropathic pain of both feet G57.93 356.9 DULoxetine (CYMBALTA) 20 mg capsule      EMG LIMITED       80 y.o. female with neuropathic feet/ lower leg pains starting in 2012 shortly after an admission for Sepsis. Non diabetic, euthyroid, neuropathy labs normal.  Will check EMG/ NCS to evaluate for peripheral neuropathy. If that is normal, then will set up patient to check Skin biopsy to look for any evidence of small fiber neuropathy. Pt was agreeable to trying Cymbalta 20 mg/ day for neuroapthic feet pain. Follow up 4-6 weeks to go over EMG results. Thank you for allowing me to be a part of your patient's care. Please call me at [phone] if you have any questions.      Sincerely,  Devin Hannah MD

## 2019-06-28 DIAGNOSIS — G57.93 NEUROPATHIC PAIN OF BOTH FEET: ICD-10-CM

## 2019-07-29 DIAGNOSIS — N39.46 MIXED STRESS AND URGE URINARY INCONTINENCE: ICD-10-CM

## 2019-07-29 RX ORDER — TOLTERODINE TARTRATE 2 MG/1
TABLET, EXTENDED RELEASE ORAL
Qty: 180 TAB | Refills: 0 | Status: SHIPPED | OUTPATIENT
Start: 2019-07-29 | End: 2019-10-24 | Stop reason: SDUPTHER

## 2019-09-04 DIAGNOSIS — I50.32 CHRONIC DIASTOLIC HEART FAILURE (HCC): ICD-10-CM

## 2019-09-09 RX ORDER — TORSEMIDE 10 MG/1
TABLET ORAL
Qty: 90 TAB | Refills: 3 | Status: SHIPPED | OUTPATIENT
Start: 2019-09-09 | End: 2020-08-20 | Stop reason: SDUPTHER

## 2019-09-19 ENCOUNTER — OFFICE VISIT (OUTPATIENT)
Dept: INTERNAL MEDICINE CLINIC | Age: 84
End: 2019-09-19

## 2019-09-19 ENCOUNTER — HOSPITAL ENCOUNTER (OUTPATIENT)
Dept: LAB | Age: 84
Discharge: HOME | End: 2019-09-19
Payer: MEDICARE

## 2019-09-19 VITALS
DIASTOLIC BLOOD PRESSURE: 74 MMHG | WEIGHT: 226 LBS | OXYGEN SATURATION: 94 % | SYSTOLIC BLOOD PRESSURE: 110 MMHG | TEMPERATURE: 98 F | RESPIRATION RATE: 18 BRPM | HEART RATE: 73 BPM | BODY MASS INDEX: 32.35 KG/M2 | HEIGHT: 70 IN

## 2019-09-19 DIAGNOSIS — E03.9 ACQUIRED HYPOTHYROIDISM: Chronic | ICD-10-CM

## 2019-09-19 DIAGNOSIS — Z23 ENCOUNTER FOR IMMUNIZATION: ICD-10-CM

## 2019-09-19 DIAGNOSIS — M54.9 BACK PAIN, UNSPECIFIED BACK LOCATION, UNSPECIFIED BACK PAIN LATERALITY, UNSPECIFIED CHRONICITY: ICD-10-CM

## 2019-09-19 DIAGNOSIS — R10.30 LOWER ABDOMINAL PAIN: Primary | ICD-10-CM

## 2019-09-19 DIAGNOSIS — N30.00 ACUTE CYSTITIS WITHOUT HEMATURIA: ICD-10-CM

## 2019-09-19 LAB
BILIRUB UR QL STRIP: NEGATIVE
GLUCOSE UR-MCNC: NEGATIVE MG/DL
KETONES P FAST UR STRIP-MCNC: NEGATIVE MG/DL
PH UR STRIP: 5.5 [PH] (ref 4.6–8)
PROT UR QL STRIP: NEGATIVE
SP GR UR STRIP: 1.01 (ref 1–1.03)
UA UROBILINOGEN AMB POC: NORMAL (ref 0.2–1)
URINALYSIS CLARITY POC: CLEAR
URINALYSIS COLOR POC: YELLOW
URINE BLOOD POC: NEGATIVE
URINE LEUKOCYTES POC: NORMAL
URINE NITRITES POC: POSITIVE

## 2019-09-19 PROCEDURE — 87086 URINE CULTURE/COLONY COUNT: CPT

## 2019-09-19 PROCEDURE — 87186 SC STD MICRODIL/AGAR DIL: CPT

## 2019-09-19 PROCEDURE — 87088 URINE BACTERIA CULTURE: CPT

## 2019-09-19 PROCEDURE — 87077 CULTURE AEROBIC IDENTIFY: CPT

## 2019-09-19 RX ORDER — AMOXICILLIN AND CLAVULANATE POTASSIUM 875; 125 MG/1; MG/1
1 TABLET, FILM COATED ORAL 2 TIMES DAILY
Qty: 14 TAB | Refills: 0 | Status: SHIPPED | OUTPATIENT
Start: 2019-09-19 | End: 2019-09-19 | Stop reason: ALTCHOICE

## 2019-09-19 RX ORDER — LEVOTHYROXINE SODIUM 150 MCG
150 TABLET ORAL
Qty: 90 TAB | Refills: 3
Start: 2019-09-19 | End: 2020-08-19 | Stop reason: SDUPTHER

## 2019-09-19 RX ORDER — CEPHALEXIN 500 MG/1
500 CAPSULE ORAL 3 TIMES DAILY
Qty: 21 CAP | Refills: 0 | Status: SHIPPED | OUTPATIENT
Start: 2019-09-19 | End: 2019-11-21 | Stop reason: ALTCHOICE

## 2019-09-20 NOTE — PROGRESS NOTES
HISTORY OF PRESENT ILLNESS    Chief Complaint   Patient presents with    Flank Pain     left side sorness, back sorness, since saturday, thinks its stomach due to swelling and wearing gurdle       Presents with left lower abdominal discomfort, mild pain. Mild suprapubic discomfort. Also with some right flank discomfort. Denies any injury. No recent falls, but walks with a rolling walker. She has had a history of mixed stress and urinary incontinence. Urinalysis today shows 3+ leukocytes and positive nitrites. Denies any fevers or chills. Review of Systems   All other systems reviewed and are negative, except as noted in HPI    Past Medical and Surgical History   has a past medical history of (HFpEF) heart failure with preserved ejection fraction ([facility] (05/22/2012), Anemia, Atrial fibrillation ([facility] (2018), Cholecystitis (2018), COPD (chronic obstructive pulmonary disease) ([facility] (7/2011), GERD (gastroesophageal reflux disease), Glaucoma suspect of both eyes, Hemorrhoids, Migraine, Mixed stress and urge urinary incontinence, OA (osteoarthritis) of knee, Obesity (BMI 30.0-34.9) (9/7/2018), Osteoporosis, Other postablative hypothyroidism (1987), Paresthesia of foot, bilateral, Psoriasis, PSVT (paroxysmal supraventricular tachycardia) ([facility] (5/19/2012), Pulmonary nodules (7/2011), Sepsis(995.91) (5/14/2012), 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 has a past surgical history that includes hx tonsillectomy; hx hysterectomy (1974); hx cataract removal; echo 2d adult (7/15/11); nm cardiac spect w strs/rest mult (7/18/11); vas lower ext art pvr mult level seg pressures (7/18/11); echo 2d adult (5/19/12); hx colonoscopy (6/28/12); hx other surgical; and hx cholecystectomy (09/08/2018). reports that she quit smoking about 8 years ago. Her smoking use included cigarettes. She has a 60.00 pack-year smoking history.  She has never used smokeless tobacco. She reports that she does not drink alcohol or use drugs. family history includes Arthritis-osteo in her mother; Cancer in her maternal grandmother and sister; Dementia in her mother; Heart Disease in her father and paternal grandfather; Seizures in her sister. Physical Exam   Nursing note and vitals reviewed. Blood pressure 110/74, pulse 73, temperature 98 °F (36.7 °C), temperature source Oral, resp. rate 18, height 5' 10\" (1.778 m), weight 226 lb (102.5 kg), SpO2 94 %. Constitutional: In no distress. Eyes: Conjunctivae are normal.  HEENT:  No LAD or thyromegaly   Cardiovascular: Normal rate. regular rhythm. No murmurs  No edema  Pulmonary/Chest: Effort normal. clear to ausculation blaterally  Musculoskeletal:  no edema. Abd: Soft, mild left lower quadrant discomfort. No rebound or guarding. Neurological: Alert and oriented. Grossly intact cranial nerves and motor function. Skin: No rash noted. Psychiatric: Normal mood and affect. Behavior is normal.     ASSESSMENT and PLAN  Diagnoses and all orders for this visit:    1. Lower abdominal pain  Unclear etiology. Mildly worse on the left lower quadrant. No other GI symptoms of concern. She did have a colonoscopy about 7 years ago. May be related to UTI, but cannot rule out diverticulitis as an option. Treat UTI as below. If symptoms are not improving, will need to consider abdominal CT scan. Symptoms are mild at this time. Given Keflex. This may also cover a mild diverticulitis, but we need to consider other medications if necessary. 2. Acute cystitis without hematuria  Large leukocytes and nitrites make UTI possible, but not obviously symptomatic  -     CULTURE, URINE  -     Try cephALEXin (KEFLEX) 500 mg capsule; Take 1 Cap by mouth three (3) times daily. 3. Back pain, unspecified back location, unspecified back pain laterality, unspecified chronicity  This may or may not be related to her possible UTI.   Suspect this is musculoskeletal.  Observe for now. -     AMB POC URINALYSIS DIP STICK AUTO W/O MICRO    4. Encounter for immunization  -     INFLUENZA VIRUS VACCINE, HIGH DOSE SEASONAL, PRESERVATIVE FREE  -     ADMIN INFLUENZA VIRUS VAC    5. Acquired hypothyroidism  -     SYNTHROID 150 mcg tablet; Take 1 Tab by mouth Daily (before breakfast). Except DO NOT TAKE on Sundays  Repeat labs in November. lab results and schedule of future lab studies reviewed with patient  reviewed medications and side effects in detail    Return to clinic for further evaluation if new symptoms develop or if current symptoms worsen or fail to resolve. There are no Patient Instructions on file for this visit.

## 2019-09-23 LAB — BACTERIA UR CULT: ABNORMAL

## 2019-09-23 NOTE — PROGRESS NOTES
Advised to continue with Keflex , pt states she feels much better today, advised to keep hydrated , call if symptoms do not resolve.

## 2019-10-24 DIAGNOSIS — N39.46 MIXED STRESS AND URGE URINARY INCONTINENCE: ICD-10-CM

## 2019-10-25 RX ORDER — TOLTERODINE TARTRATE 2 MG/1
TABLET, EXTENDED RELEASE ORAL
Qty: 180 TAB | Refills: 0 | Status: SHIPPED | OUTPATIENT
Start: 2019-10-25 | End: 2019-11-21

## 2019-11-21 ENCOUNTER — HOSPITAL ENCOUNTER (OUTPATIENT)
Dept: LAB | Age: 84
Discharge: HOME | End: 2019-11-21

## 2019-11-21 ENCOUNTER — OFFICE VISIT (OUTPATIENT)
Dept: INTERNAL MEDICINE CLINIC | Age: 84
End: 2019-11-21

## 2019-11-21 VITALS
DIASTOLIC BLOOD PRESSURE: 68 MMHG | WEIGHT: 226 LBS | BODY MASS INDEX: 32.35 KG/M2 | OXYGEN SATURATION: 93 % | SYSTOLIC BLOOD PRESSURE: 104 MMHG | HEIGHT: 70 IN | HEART RATE: 71 BPM | TEMPERATURE: 98 F | RESPIRATION RATE: 18 BRPM

## 2019-11-21 DIAGNOSIS — G57.93 NEUROPATHY OF BOTH FEET: ICD-10-CM

## 2019-11-21 DIAGNOSIS — M54.9 BACK PAIN, UNSPECIFIED BACK LOCATION, UNSPECIFIED BACK PAIN LATERALITY, UNSPECIFIED CHRONICITY: ICD-10-CM

## 2019-11-21 DIAGNOSIS — E03.9 ACQUIRED HYPOTHYROIDISM: ICD-10-CM

## 2019-11-21 DIAGNOSIS — N39.46 MIXED STRESS AND URGE URINARY INCONTINENCE: ICD-10-CM

## 2019-11-21 DIAGNOSIS — N18.30 CKD (CHRONIC KIDNEY DISEASE), STAGE III (HCC): ICD-10-CM

## 2019-11-21 DIAGNOSIS — E03.9 ACQUIRED HYPOTHYROIDISM: Primary | ICD-10-CM

## 2019-11-21 LAB
ALBUMIN SERPL-MCNC: 3.8 G/DL (ref 3.5–5)
ALBUMIN/GLOB SERPL: 1.3 {RATIO} (ref 1.1–2.2)
ALP SERPL-CCNC: 121 U/L (ref 45–117)
ALT SERPL-CCNC: 18 U/L (ref 12–78)
ANION GAP SERPL CALC-SCNC: 7 MMOL/L (ref 5–15)
AST SERPL-CCNC: 13 U/L (ref 15–37)
BILIRUB SERPL-MCNC: 0.3 MG/DL (ref 0.2–1)
BUN SERPL-MCNC: 23 MG/DL (ref 6–20)
BUN/CREAT SERPL: 18 (ref 12–20)
CALCIUM SERPL-MCNC: 9.2 MG/DL (ref 8.5–10.1)
CHLORIDE SERPL-SCNC: 107 MMOL/L (ref 97–108)
CO2 SERPL-SCNC: 28 MMOL/L (ref 21–32)
CREAT SERPL-MCNC: 1.31 MG/DL (ref 0.55–1.02)
ERYTHROCYTE [DISTWIDTH] IN BLOOD BY AUTOMATED COUNT: 14.3 % (ref 11.5–14.5)
GLOBULIN SER CALC-MCNC: 3 G/DL (ref 2–4)
GLUCOSE SERPL-MCNC: 85 MG/DL (ref 65–100)
HCT VFR BLD AUTO: 42.2 % (ref 35–47)
HGB BLD-MCNC: 13.2 G/DL (ref 11.5–16)
MCH RBC QN AUTO: 31.3 PG (ref 26–34)
MCHC RBC AUTO-ENTMCNC: 31.3 G/DL (ref 30–36.5)
MCV RBC AUTO: 100 FL (ref 80–99)
NRBC # BLD: 0 K/UL (ref 0–0.01)
NRBC BLD-RTO: 0 PER 100 WBC
PLATELET # BLD AUTO: 286 K/UL (ref 150–400)
PMV BLD AUTO: 10.3 FL (ref 8.9–12.9)
POTASSIUM SERPL-SCNC: 4.2 MMOL/L (ref 3.5–5.1)
PROT SERPL-MCNC: 6.8 G/DL (ref 6.4–8.2)
RBC # BLD AUTO: 4.22 M/UL (ref 3.8–5.2)
SODIUM SERPL-SCNC: 142 MMOL/L (ref 136–145)
TSH SERPL DL<=0.05 MIU/L-ACNC: 0.31 UIU/ML (ref 0.36–3.74)
WBC # BLD AUTO: 6.2 K/UL (ref 3.6–11)

## 2019-11-21 RX ORDER — TOLTERODINE TARTRATE 2 MG/1
TABLET, EXTENDED RELEASE ORAL
Qty: 180 TAB | Refills: 3 | Status: SHIPPED | OUTPATIENT
Start: 2019-11-21 | End: 2020-02-02

## 2019-11-21 RX ORDER — DEXTROMETHORPHAN HYDROBROMIDE, GUAIFENESIN 5; 100 MG/5ML; MG/5ML
650 LIQUID ORAL EVERY 8 HOURS
COMMUNITY

## 2019-11-21 NOTE — PATIENT INSTRUCTIONS
Body Mass Index: Care Instructions Your Care Instructions Body mass index (BMI) can help you see if your weight is raising your risk for health problems. It uses a formula to compare how much you weigh with how tall you are. · A BMI lower than 18.5 is considered underweight. · A BMI between 18.5 and 24.9 is considered healthy. · A BMI between 25 and 29.9 is considered overweight. A BMI of 30 or higher is considered obese. If your BMI is in the normal range, it means that you have a lower risk for weight-related health problems. If your BMI is in the overweight or obese range, you may be at increased risk for weight-related health problems, such as high blood pressure, heart disease, stroke, arthritis or joint pain, and diabetes. If your BMI is in the underweight range, you may be at increased risk for health problems such as fatigue, lower protection (immunity) against illness, muscle loss, bone loss, hair loss, and hormone problems. BMI is just one measure of your risk for weight-related health problems. You may be at higher risk for health problems if you are not active, you eat an unhealthy diet, or you drink too much alcohol or use tobacco products. Follow-up care is a key part of your treatment and safety. Be sure to make and go to all appointments, and call your doctor if you are having problems. It's also a good idea to know your test results and keep a list of the medicines you take. How can you care for yourself at home? · Practice healthy eating habits. This includes eating plenty of fruits, vegetables, whole grains, lean protein, and low-fat dairy. · If your doctor recommends it, get more exercise. Walking is a good choice. Bit by bit, increase the amount you walk every day. Try for at least 30 minutes on most days of the week. · Do not smoke. Smoking can increase your risk for health problems.  If you need help quitting, talk to your doctor about stop-smoking programs and medicines. These can increase your chances of quitting for good. · Limit alcohol to 2 drinks a day for men and 1 drink a day for women. Too much alcohol can cause health problems. If you have a BMI higher than 25 · Your doctor may do other tests to check your risk for weight-related health problems. This may include measuring the distance around your waist. A waist measurement of more than 40 inches in men or 35 inches in women can increase the risk of weight-related health problems. · Talk with your doctor about steps you can take to stay healthy or improve your health. You may need to make lifestyle changes to lose weight and stay healthy, such as changing your diet and getting regular exercise. If you have a BMI lower than 18.5 · Your doctor may do other tests to check your risk for health problems. · Talk with your doctor about steps you can take to stay healthy or improve your health. You may need to make lifestyle changes to gain or maintain weight and stay healthy, such as getting more healthy foods in your diet and doing exercises to build muscle. Where can you learn more? Go to http://jean-pierre-trice.info/. Enter S176 in the search box to learn more about \"Body Mass Index: Care Instructions. \" Current as of: October 13, 2016 Content Version: 11.4 © 7665-4918 [employer]. Care instructions adapted under license by [employer] (which disclaims liability or warranty for this information). If you have questions about a medical condition or this instruction, always ask your healthcare professional. [address] any warranty or liability for your use of this information.

## 2019-11-21 NOTE — PROGRESS NOTES
HISTORY OF PRESENT ILLNESS    Chief Complaint   Patient presents with    Back Pain     f/u on back and side pain from may?  Arthritis     worried about taking eliquis and tylenol       Presents for follow-up  She is doing fairly well. She will go to [place] to spend Thanksgiving with her daughter and then may stay down here for a little while. Reports that her lower abdominal pain has resolved and she took antibiotics for UTI on September 19. Ongoing pains of her back. Taking Tylenol as needed. Asks if it is okay to take this with Eliquis. She was informed that it is. Neuropathic pains of feet which are reasonably stable. Hypothyroidism follow-up  denies heat/cold intolerance, bowel/skin changes or CVS symptoms, losing hair, feeling excessive energy, tremulousness, palpitations. Thyroid medication has been unchanged since last medication check and labs.    Lab Results   Component Value Date/Time    TSH 0.31 (L) 11/21/2019 04:50 PM    T4, Free 1.57 05/08/2019 04:44 PM     Wt Readings from Last 3 Encounters:   11/21/19 226 lb (102.5 kg)   09/19/19 226 lb (102.5 kg)   06/14/19 228 lb (103.4 kg)            Review of Systems   All other systems reviewed and are negative, except as noted in HPI    Past Medical and Surgical History   has a past medical history of (HFpEF) heart failure with preserved ejection fraction ([address] (05/22/2012), Anemia, Atrial fibrillation ([address] (2018), Cholecystitis (2018), COPD (chronic obstructive pulmonary disease) ([address] (7/2011), GERD (gastroesophageal reflux disease), Glaucoma suspect of both eyes, Hemorrhoids, Migraine, Mixed stress and urge urinary incontinence, Neuropathy of both feet (06/2019), OA (osteoarthritis) of knee, Obesity (BMI 30.0-34.9) (9/7/2018), Osteoporosis, Other postablative hypothyroidism (1987), Paresthesia of foot, bilateral, Psoriasis, PSVT (paroxysmal supraventricular tachycardia) ([address] (5/19/2012), Pulmonary nodules (7/2011), Sepsis(995.91) (5/14/2012), 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 has a past surgical history that includes hx tonsillectomy; hx hysterectomy (1974); hx cataract removal; echo 2d adult (7/15/11); nm cardiac spect w strs/rest mult (7/18/11); vas lower ext art pvr mult level seg pressures (7/18/11); echo 2d adult (5/19/12); hx colonoscopy (6/28/12); hx other surgical; and hx cholecystectomy (09/08/2018). reports that she quit smoking about 8 years ago. Her smoking use included cigarettes. She has a 60.00 pack-year smoking history. She has never used smokeless tobacco. She reports that she does not drink alcohol or use drugs. family history includes Arthritis-osteo in her mother; Cancer in her maternal grandmother and sister; Dementia in her mother; Heart Disease in her father and paternal grandfather; Seizures in her sister. Physical Exam   Nursing note and vitals reviewed. Blood pressure 104/68, pulse 71, temperature 98 °F (36.7 °C), temperature source Oral, resp. rate 18, height 5' 10\" (1.778 m), weight 226 lb (102.5 kg), SpO2 93 %. Constitutional:  No distress. Eyes: Conjunctivae are normal.   Ears:  Hearing grossly intact  Cardiovascular: Normal rate. regular rhythm, no murmurs or gallops  No edema  Pulmonary/Chest: Effort normal.   CTAB  Musculoskeletal: moves all 4 extremities   Neurological: Alert and oriented to person, place, and time. Skin: No rash noted. Psychiatric: Normal mood and affect. Behavior is normal.     ASSESSMENT and PLAN  Diagnoses and all orders for this visit:    1. Acquired hypothyroidism  TSH is a little bit low, will keep current dose and repeat in 6 months.  -     TSH 3RD GENERATION; Future    2. Back pain, unspecified back location, unspecified back pain laterality, unspecified chronicity  Stretching exercises demonstrated for for this condition  Continue Tylenol as needed    3.  CKD (chronic kidney disease), stage III ([facility]  Unclear status. -     CBC W/O DIFF; Future  -     METABOLIC PANEL, COMPREHENSIVE; Future    4. Neuropathy of both feet    5. Mixed stress and urge urinary incontinence  -     tolterodine (DETROL) 2 mg tablet; TAKE 1 TABLET BY MOUTH TWICE A DAY            lab results and schedule of future lab studies reviewed with patient  reviewed medications and side effects in detail    Return to clinic for further evaluation if new symptoms develop        Current Outpatient Medications   Medication Sig    acetaminophen (TYLENOL ARTHRITIS PAIN) 650 mg TbER Take 650 mg by mouth every eight (8) hours. One in the morning, one at night    tolterodine (DETROL) 2 mg tablet TAKE 1 TABLET BY MOUTH TWICE A DAY    SYNTHROID 150 mcg tablet Take 1 Tab by mouth Daily (before breakfast). Except DO NOT TAKE on Sundays    torsemide (DEMADEX) 10 mg tablet TAKE 1 TABLET BY MOUTH EVERY DAY    metoprolol succinate (TOPROL-XL) 50 mg XL tablet TAKE 1 TABLET BY MOUTH EVERY DAY    apixaban (ELIQUIS) 5 mg tablet Take 1 Tab by mouth two (2) times a day.  Vit A,C,E-Zinc-Copper (PRESERVISION AREDS) cap capsule Take 1 Cap by mouth two (2) times a day.  cholecalciferol (VITAMIN D3) 1,000 unit cap Take 1,000 Units by mouth daily.  albuterol (PROVENTIL HFA) 90 mcg/actuation inhaler Take 2 Puffs by inhalation every four (4) hours as needed for Wheezing. (Patient taking differently: Take 2 Puffs by inhalation as needed for Wheezing.)    aspirin delayed-release (ASPIR-81) 81 mg tablet Take 81 mg by mouth daily. No current facility-administered medications for this visit. Discussed the patient's BMI with her. The BMI follow up plan is as follows:     dietary management education, guidance, and counseling  encourage exercise  monitor weight  prescribed dietary intake    An After Visit Summary was printed and given to the patient.

## 2020-01-13 RX ORDER — APIXABAN 5 MG/1
TABLET, FILM COATED ORAL
Qty: 180 TAB | Refills: 0 | Status: SHIPPED | OUTPATIENT
Start: 2020-01-13 | End: 2020-04-09 | Stop reason: SDUPTHER

## 2020-01-13 NOTE — TELEPHONE ENCOUNTER
Per Dr. Leung Herminio VO:  BKV:4/61/1614  Future Appointments   Date Time Provider Melania Lizzie   2/27/2020  3:00 PM Vivien Díaz MD [facility] YUSRA SCHED     Requested Prescriptions     Signed Prescriptions Disp Refills    ELIQUIS 5 mg tablet 180 Tab 0     Sig: TAKE 1 TABLET BY MOUTH TWICE A DAY     Authorizing Provider: Denys Halsted     Ordering User: Kathya Sullivan

## 2020-01-13 NOTE — TELEPHONE ENCOUNTER
Pharmacy confirmed. Patient is out of medication.  90 day supply    [phone]

## 2020-01-13 NOTE — TELEPHONE ENCOUNTER
Per Dr. Andrzej Barba VO:  SFE:2/88/4525  Future Appointments   Date Time Provider Melania Samuel   2/27/2020  3:00 PM Ino Parker MD [facility]     Requested Prescriptions     Pending Prescriptions Disp Refills    apixaban (ELIQUIS) 5 mg tablet 180 Tab 0

## 2020-02-27 ENCOUNTER — OFFICE VISIT (OUTPATIENT)
Dept: CARDIOLOGY CLINIC | Age: 85
End: 2020-02-27

## 2020-02-27 VITALS
BODY MASS INDEX: 31.78 KG/M2 | SYSTOLIC BLOOD PRESSURE: 110 MMHG | HEIGHT: 70 IN | WEIGHT: 222 LBS | DIASTOLIC BLOOD PRESSURE: 68 MMHG | HEART RATE: 78 BPM | RESPIRATION RATE: 16 BRPM | OXYGEN SATURATION: 96 %

## 2020-02-27 DIAGNOSIS — I50.32 CHRONIC DIASTOLIC HEART FAILURE (HCC): ICD-10-CM

## 2020-02-27 DIAGNOSIS — J44.9 CHRONIC OBSTRUCTIVE PULMONARY DISEASE, UNSPECIFIED COPD TYPE (HCC): ICD-10-CM

## 2020-02-27 DIAGNOSIS — I48.0 PAF (PAROXYSMAL ATRIAL FIBRILLATION) (HCC): Primary | ICD-10-CM

## 2020-02-27 RX ORDER — METOPROLOL SUCCINATE 50 MG/1
50 TABLET, EXTENDED RELEASE ORAL DAILY
Qty: 90 TAB | Refills: 3 | Status: SHIPPED | OUTPATIENT
Start: 2020-02-27 | End: 2021-03-04

## 2020-02-27 NOTE — PROGRESS NOTES
Thaddeus Colón MD. [facility]              Patient: Marquis Kilgore  : 1932      Today's Date: 2020            HISTORY OF PRESENT ILLNESS:     History of Present Illness:  Here for follow-up. She is doing OK. Had bronchitis a few weeks ago - doing better. No cardiac issues lately. No problems bleeding. No CP or sig SOB. PAST MEDICAL HISTORY:     Past Medical History:   Diagnosis Date    (HFpEF) heart failure with preserved ejection fraction ([address] 2012    first presented  in the setting of sepsis    Anemia     hx b12 def age 22-31s    Atrial fibrillation ([address] 2018    Cholecystitis 2018    COPD (chronic obstructive pulmonary disease) ([facility] 2011    FEV1 1 L 2011. Dr. Gracy Sheffield GERD (gastroesophageal reflux disease)     Glaucoma suspect of both eyes     Dr Maria Ines Copeland    Hemorrhoids     Migraine     Mixed stress and urge urinary incontinence     Neuropathy of both feet 2019    consult Dr. Sonal Kingsley.  OA (osteoarthritis) of knee     R, Dr. Teri Arroyo. Euflexa injections x3. has walker    Obesity (BMI 30.0-34.9) 2018    Osteoporosis     declines medication tx    Other postablative hypothyroidism     ROSAS   saw Dr. Layo Oglesby Paresthesia of foot, bilateral     Psoriasis     left ear    PSVT (paroxysmal supraventricular tachycardia) ([facility] 2012    Dr. Halle Sorto Pulmonary nodules 2011    RLL x2.  stable 2012  Dr. 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  Dr. Caroline Montgomery HYSTERECTOMY  1974    menorrhagia    HX OTHER SURGICAL      Echo 14 - LVEF 60-65%, grade 1 DD    HX TONSILLECTOMY      NM CARDIAC SPECT W STRS/REST MULT  11    small sized fixed defect in inferolateral wall may represent scar or abd attenuation; EF 80%    VAS LOWER EXT ART PVR MULT LEVEL SEG PRESSURES  11    normal           MEDICATIONS:     Current Outpatient Medications   Medication Sig Dispense Refill    tolterodine (DETROL) 2 mg tablet TAKE 1 TABLET BY MOUTH TWICE A  Tab 1    ELIQUIS 5 mg tablet TAKE 1 TABLET BY MOUTH TWICE A  Tab 0    acetaminophen (TYLENOL ARTHRITIS PAIN) 650 mg TbER Take 650 mg by mouth every eight (8) hours. One in the morning, one at night      SYNTHROID 150 mcg tablet Take 1 Tab by mouth Daily (before breakfast). Except DO NOT TAKE on Sundays 90 Tab 3    torsemide (DEMADEX) 10 mg tablet TAKE 1 TABLET BY MOUTH EVERY DAY 90 Tab 3    metoprolol succinate (TOPROL-XL) 50 mg XL tablet TAKE 1 TABLET BY MOUTH EVERY DAY 90 Tab 3    Vit A,C,E-Zinc-Copper (PRESERVISION AREDS) cap capsule Take 1 Cap by mouth two (2) times a day.  cholecalciferol (VITAMIN D3) 1,000 unit cap Take 1,000 Units by mouth daily.  albuterol (PROVENTIL HFA) 90 mcg/actuation inhaler Take 2 Puffs by inhalation every four (4) hours as needed for Wheezing. (Patient taking differently: Take 2 Puffs by inhalation as needed for Wheezing.) 1 Inhaler 5    aspirin delayed-release (ASPIR-81) 81 mg tablet Take 81 mg by mouth daily.          Allergies   Allergen Reactions    Ciprofloxacin (Bulk) Rash    Diltiazem Rash    Piperacillin Other (comments)     Appears to have tolerated 12-12           SOCIAL HISTORY:     Social History     Tobacco Use    Smoking status: Former Smoker     Packs/day: 1.00     Years: 60.00     Pack years: 60.00     Types: Cigarettes     Last attempt to quit: 2011     Years since quittin.5    Smokeless tobacco: Never Used   Substance Use Topics    Alcohol use: No     Alcohol/week: 0.0 standard drinks    Drug use: No         FAMILY HISTORY:     Family History   Problem Relation Age of Onset    Cancer Maternal Grandmother         breast    Cancer Sister         lymphopma    Heart Disease Father     Heart Disease Paternal Grandfather     Seizures Sister     Arthritis-osteo Mother     Dementia Mother     Malignant Hyperthermia Neg Hx     Pseudocholinesterase Deficiency Neg Hx     Delayed Awakening Neg Hx     Post-op Nausea/Vomiting Neg Hx     Emergence Delirium Neg Hx     Post-op Cognitive Dysfunction Neg Hx     Other Neg Hx             REVIEW OF SYSTEMS:       Review of Systems:    Constitutional: Negative for fever, chills    HEENT: Negative for vision changes.    Respiratory: Negative for cough    Cardiovascular: Negative for orthopnea, syncope, and PND.    Gastrointestinal: Negative for abdominal pain, diarrhea, or melena    Genitourinary: Negative for dysuria  + urinary incontinence at times   Musculoskeletal: Negative for myalgias. + joint pain    Skin: Negative for rash    Heme: No problems bleeding.    Neurological: + leg neuropathy               PHYSICAL EXAM:       Physical Exam:   Visit Vitals  /68 (BP 1 Location: Left arm, BP Patient Position: Sitting)   Pulse 78   Resp 16   Ht 5' 10\" (1.778 m)   Wt 222 lb (100.7 kg)   SpO2 96%   BMI 31.85 kg/m²       Patient appears generally well, mood and affect are appropriate and pleasant.    HEENT: Normocephalic, atraumatic.    Neck Exam: Supple, No JVD  Lung Exam: Clear to auscultation, even breath sounds.    Cardiac Exam: Regular rate and rhythm with no murmur  Abdomen: Soft, non-tender, normal bowel sounds. + obese  Extremities: trace bilat ankle edema.   MAW  Neuro - non focal   Psych - appropriate affect           LABS / OTHER STUDIES:       Lab Results   Component Value Date/Time    Sodium 142 11/21/2019 04:50 PM    Potassium 4.2 11/21/2019 04:50 PM    Chloride 107 11/21/2019 04:50 PM    CO2 28 11/21/2019 04:50 PM    Anion gap 7 11/21/2019 04:50 PM    Glucose 85 11/21/2019 04:50 PM    BUN 23 (H) 11/21/2019 04:50 PM    Creatinine 1.31 (H) 11/21/2019 04:50 PM    BUN/Creatinine ratio 18 11/21/2019 04:50 PM    GFR est AA 47 (L) 11/21/2019 04:50 PM    GFR est non-AA 38 (L) 11/21/2019 04:50 PM    Calcium 9.2 11/21/2019 04:50 PM    Bilirubin, total 0.3 11/21/2019 04:50 PM    AST (SGOT) 13 (L) 11/21/2019 04:50 PM    Alk. phosphatase 121 (H) 11/21/2019 04:50 PM    Protein, total 6.8 11/21/2019 04:50 PM    Albumin 3.8 11/21/2019 04:50 PM    Globulin 3.0 11/21/2019 04:50 PM    A-G Ratio 1.3 11/21/2019 04:50 PM    ALT (SGPT) 18 11/21/2019 04:50 PM     Lab Results   Component Value Date/Time    WBC 6.2 11/21/2019 04:50 PM    Hemoglobin (POC) 11.2 (L) 05/11/2012 09:58 PM    HGB 13.2 11/21/2019 04:50 PM    Hematocrit (POC) 33 (L) 05/11/2012 09:58 PM    HCT 42.2 11/21/2019 04:50 PM    PLATELET 820 10/94/5668 04:50 PM    .0 (H) 11/21/2019 04:50 PM       Lab Results   Component Value Date/Time    Cholesterol, total 217 (H) 10/03/2018 12:01 PM    HDL Cholesterol 62 10/03/2018 12:01 PM    LDL, calculated 135 (H) 10/03/2018 12:01 PM    VLDL, calculated 20 10/03/2018 12:01 PM    Triglyceride 99 10/03/2018 12:01 PM       Lab Results   Component Value Date/Time    TSH 0.31 (L) 11/21/2019 04:50 PM                    CARDIAC DIAGNOSTICS:       Cardiac Evaluation Includes:  Echo 2011 - EF 65%, mild LVH, mild PA HTN  Echo 5/2012 - EF 55 % to 60 %. There were no regional wall motion abnormalities.   Echo 7/31/14 - EF 60 % to 65 %.grade 1 diastolic dysfunction, mild TR,  Echo 9/10/18 - EF 60%, suboptimal visulization of wall motion, nml RV, mild pulm HTN, IVC dilated.      EKG 8/8/18 - Afib, , RBBB  EKG 9/9/18 - Atrial flutter, RBBB, HR 96  ECG 10/3/2018 - NSR, RBBB, PVC  EKG 2/27/20 - NSR, RBBB             ASSESSMENT AND PLAN:       Assessment and Plan:    1) Afib / Atrial flutter  - newly diagnosed 9/7/18 when admitted with acute anam. TSH was also low then. - Is back in [facility] on 10/3/18   - Given her risk of recurrent afib and high [id number] score of 4, I think long term [address] would be best   - continue BB and Eliquis   - Doing OK 2/27/20 -- she can stop ASA since on Eliquis      2) Chronic LE Edema    - Has chronic complaints    - Problems due to Venous Insufficiency most likely  - Echo 7/31/14 - LVEF 60-65%, grade 1 DD  - I stopped verapamil in past and instead tried a beta-blocker (Toprol XL 50 mg daily), with mild improvement in symptoms    - Continue with leg elevation. Encourage support hose.     - Continue diuretic (torsemide) - stable symptoms         3) Chronic GARCIA    - due to COPD.    - Prior echo and stress test without major abnormalities.     4) Dyslipidemia  - she declines statin   - At age > 71, benefit of statin for primary prevention is not clear       5) See me back in one year. Patient expressed understanding of the plan - questions were answered. Sister has lymphoma.  Has a daughter in [place]; step-daughter close by. 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          Júnior Law MD, [address]  [address] [place] [address], [address]      [id number] [address]  [address]  [facility], [address] [place] [address]  Ph: [phone]                                [phone]

## 2020-02-27 NOTE — PROGRESS NOTES
Chief Complaint   Patient presents with    Annual Exam    Irregular Heart Beat     A Fib    Other     HFpEF & Heart Failure    SVT     Visit Vitals  /68 (BP 1 Location: Left arm, BP Patient Position: Sitting)   Pulse 78   Resp 16   Ht 5' 10\" (1.778 m)   Wt 222 lb (100.7 kg)   SpO2 96%   BMI 31.85 kg/m²     Patient presents to office with no complaints of pain, dizziness, or chest pain. SOB on exertion eases with rest. Swelling in ankles. No refills needed. No recent hospitalizations reported. Recently saw a neurologist. In November she had issues with UTI and had blood in urine.    Sandra Solano LPN

## 2020-04-09 ENCOUNTER — TELEPHONE (OUTPATIENT)
Dept: CARDIOLOGY CLINIC | Age: 85
End: 2020-04-09

## 2020-04-09 NOTE — TELEPHONE ENCOUNTER
Patient states she needs to speak with you about a medication refill. Please advise.      Phone: [phone]

## 2020-08-07 DIAGNOSIS — N39.46 MIXED STRESS AND URGE URINARY INCONTINENCE: ICD-10-CM

## 2020-08-07 RX ORDER — TOLTERODINE TARTRATE 2 MG/1
TABLET, EXTENDED RELEASE ORAL
Qty: 180 TAB | Refills: 1 | Status: SHIPPED | OUTPATIENT
Start: 2020-08-07 | End: 2021-02-22 | Stop reason: SDUPTHER

## 2020-08-14 ENCOUNTER — OFFICE VISIT (OUTPATIENT)
Dept: INTERNAL MEDICINE CLINIC | Age: 85
End: 2020-08-14
Payer: MEDICARE

## 2020-08-14 ENCOUNTER — HOSPITAL ENCOUNTER (OUTPATIENT)
Dept: LAB | Age: 85
Discharge: HOME | End: 2020-08-14

## 2020-08-14 VITALS
HEART RATE: 75 BPM | OXYGEN SATURATION: 93 % | TEMPERATURE: 97.3 F | WEIGHT: 225.2 LBS | RESPIRATION RATE: 12 BRPM | DIASTOLIC BLOOD PRESSURE: 74 MMHG | BODY MASS INDEX: 32.24 KG/M2 | HEIGHT: 70 IN | SYSTOLIC BLOOD PRESSURE: 117 MMHG

## 2020-08-14 DIAGNOSIS — M17.0 PRIMARY OSTEOARTHRITIS OF BOTH KNEES: ICD-10-CM

## 2020-08-14 DIAGNOSIS — B35.3 TINEA PEDIS OF LEFT FOOT: ICD-10-CM

## 2020-08-14 DIAGNOSIS — G57.93 NEUROPATHY OF BOTH FEET: ICD-10-CM

## 2020-08-14 DIAGNOSIS — I48.0 PAF (PAROXYSMAL ATRIAL FIBRILLATION) (HCC): ICD-10-CM

## 2020-08-14 DIAGNOSIS — M54.50 ACUTE RIGHT-SIDED LOW BACK PAIN WITHOUT SCIATICA: ICD-10-CM

## 2020-08-14 DIAGNOSIS — E03.9 ACQUIRED HYPOTHYROIDISM: ICD-10-CM

## 2020-08-14 DIAGNOSIS — N18.30 CKD (CHRONIC KIDNEY DISEASE), STAGE III (HCC): ICD-10-CM

## 2020-08-14 DIAGNOSIS — Z00.00 MEDICARE ANNUAL WELLNESS VISIT, SUBSEQUENT: Primary | ICD-10-CM

## 2020-08-14 DIAGNOSIS — Z74.09 IMPAIRED FUNCTIONAL MOBILITY, BALANCE, GAIT, AND ENDURANCE: ICD-10-CM

## 2020-08-14 DIAGNOSIS — I50.32 CHRONIC HEART FAILURE WITH PRESERVED EJECTION FRACTION (HCC): ICD-10-CM

## 2020-08-14 LAB
ALBUMIN SERPL-MCNC: 3.7 G/DL (ref 3.5–5)
ALBUMIN/GLOB SERPL: 1.1 {RATIO} (ref 1.1–2.2)
ALP SERPL-CCNC: 124 U/L (ref 45–117)
ALT SERPL-CCNC: 17 U/L (ref 12–78)
ANION GAP SERPL CALC-SCNC: 7 MMOL/L (ref 5–15)
APPEARANCE UR: ABNORMAL
AST SERPL-CCNC: 15 U/L (ref 15–37)
BACTERIA URNS QL MICRO: ABNORMAL /HPF
BASOPHILS # BLD: 0.1 K/UL (ref 0–0.1)
BASOPHILS NFR BLD: 1 % (ref 0–1)
BILIRUB SERPL-MCNC: 0.5 MG/DL (ref 0.2–1)
BILIRUB UR QL: NEGATIVE
BUN SERPL-MCNC: 36 MG/DL (ref 6–20)
BUN/CREAT SERPL: 20 (ref 12–20)
CALCIUM SERPL-MCNC: 9.5 MG/DL (ref 8.5–10.1)
CHLORIDE SERPL-SCNC: 106 MMOL/L (ref 97–108)
CO2 SERPL-SCNC: 27 MMOL/L (ref 21–32)
COLOR UR: ABNORMAL
CREAT SERPL-MCNC: 1.82 MG/DL (ref 0.55–1.02)
DIFFERENTIAL METHOD BLD: NORMAL
EOSINOPHIL # BLD: 0.3 K/UL (ref 0–0.4)
EOSINOPHIL NFR BLD: 4 % (ref 0–7)
EPITH CASTS URNS QL MICRO: ABNORMAL /LPF
ERYTHROCYTE [DISTWIDTH] IN BLOOD BY AUTOMATED COUNT: 14.5 % (ref 11.5–14.5)
GLOBULIN SER CALC-MCNC: 3.4 G/DL (ref 2–4)
GLUCOSE SERPL-MCNC: 88 MG/DL (ref 65–100)
GLUCOSE UR STRIP.AUTO-MCNC: NEGATIVE MG/DL
HCT VFR BLD AUTO: 42.8 % (ref 35–47)
HGB BLD-MCNC: 13.2 G/DL (ref 11.5–16)
HGB UR QL STRIP: NEGATIVE
HYALINE CASTS URNS QL MICRO: ABNORMAL /LPF (ref 0–5)
IMM GRANULOCYTES # BLD AUTO: 0 K/UL (ref 0–0.04)
IMM GRANULOCYTES NFR BLD AUTO: 0 % (ref 0–0.5)
KETONES UR QL STRIP.AUTO: NEGATIVE MG/DL
LEUKOCYTE ESTERASE UR QL STRIP.AUTO: ABNORMAL
LYMPHOCYTES # BLD: 1.1 K/UL (ref 0.8–3.5)
LYMPHOCYTES NFR BLD: 17 % (ref 12–49)
MCH RBC QN AUTO: 29.8 PG (ref 26–34)
MCHC RBC AUTO-ENTMCNC: 30.8 G/DL (ref 30–36.5)
MCV RBC AUTO: 96.6 FL (ref 80–99)
MONOCYTES # BLD: 0.5 K/UL (ref 0–1)
MONOCYTES NFR BLD: 8 % (ref 5–13)
NEUTS SEG # BLD: 4.4 K/UL (ref 1.8–8)
NEUTS SEG NFR BLD: 70 % (ref 32–75)
NITRITE UR QL STRIP.AUTO: POSITIVE
NRBC # BLD: 0 K/UL (ref 0–0.01)
NRBC BLD-RTO: 0 PER 100 WBC
PH UR STRIP: 5 [PH] (ref 5–8)
PLATELET # BLD AUTO: 246 K/UL (ref 150–400)
PMV BLD AUTO: 10 FL (ref 8.9–12.9)
POTASSIUM SERPL-SCNC: 4.7 MMOL/L (ref 3.5–5.1)
PROT SERPL-MCNC: 7.1 G/DL (ref 6.4–8.2)
PROT UR STRIP-MCNC: NEGATIVE MG/DL
RBC # BLD AUTO: 4.43 M/UL (ref 3.8–5.2)
RBC #/AREA URNS HPF: ABNORMAL /HPF (ref 0–5)
SODIUM SERPL-SCNC: 140 MMOL/L (ref 136–145)
SP GR UR REFRACTOMETRY: 1.01 (ref 1–1.03)
T4 FREE SERPL-MCNC: 1.5 NG/DL (ref 0.8–1.5)
TSH SERPL DL<=0.05 MIU/L-ACNC: 0.34 UIU/ML (ref 0.36–3.74)
UROBILINOGEN UR QL STRIP.AUTO: 0.2 EU/DL (ref 0.2–1)
WBC # BLD AUTO: 6.4 K/UL (ref 3.6–11)
WBC URNS QL MICRO: >100 /HPF (ref 0–4)

## 2020-08-14 PROCEDURE — G8417 CALC BMI ABV UP PARAM F/U: HCPCS | Performed by: INTERNAL MEDICINE

## 2020-08-14 PROCEDURE — 1101F PT FALLS ASSESS-DOCD LE1/YR: CPT | Performed by: INTERNAL MEDICINE

## 2020-08-14 PROCEDURE — G8427 DOCREV CUR MEDS BY ELIG CLIN: HCPCS | Performed by: INTERNAL MEDICINE

## 2020-08-14 PROCEDURE — G8536 NO DOC ELDER MAL SCRN: HCPCS | Performed by: INTERNAL MEDICINE

## 2020-08-14 PROCEDURE — 1090F PRES/ABSN URINE INCON ASSESS: CPT | Performed by: INTERNAL MEDICINE

## 2020-08-14 PROCEDURE — 99214 OFFICE O/P EST MOD 30 MIN: CPT | Performed by: INTERNAL MEDICINE

## 2020-08-14 PROCEDURE — G0439 PPPS, SUBSEQ VISIT: HCPCS | Performed by: INTERNAL MEDICINE

## 2020-08-14 PROCEDURE — G8510 SCR DEP NEG, NO PLAN REQD: HCPCS | Performed by: INTERNAL MEDICINE

## 2020-08-14 RX ORDER — CLOTRIMAZOLE AND BETAMETHASONE DIPROPIONATE 10; .64 MG/G; MG/G
CREAM TOPICAL 2 TIMES DAILY
Qty: 30 G | Refills: 0 | Status: SHIPPED | OUTPATIENT
Start: 2020-08-14 | End: 2020-09-25 | Stop reason: ALTCHOICE

## 2020-08-14 NOTE — PROGRESS NOTES
This is a Subsequent Medicare Annual Wellness Visit providing Personalized Prevention Plan Services (PPPS) (Performed 12 months after initial AWV and PPPS )    I have reviewed the patient's medical history in detail and updated the computerized patient record. He is alone today in the office. Walking with a rolling walker. Has been relatively socially isolating because of COVID-19. Reports right-sided lower back pain which radiates to her right lateral hip. Onset was about 3 weeks ago. Denies any injury. 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  Denies true weakness of the lower extremity. Has been having increased difficulty walking secondary to this. Has been taking no medication for it. Wonders if she has a hip issue but she was told that hip pain was present in her inner groin. Also is concerned about possibility of kidney problems causing this. She does have a history of UTI. No current urinary symptoms of increased dysuria or burning. She does have chronic intermittent urinary incontinence. Atrial fibrillation. She is followed by Dr. Zita Dodge. Last seen by him on February 27, 2020. Diastolic congestive heart failure. Denies any increased edema or shortness of breath. Recommended to maintain Eliquis and beta-blocker long-term. High risk of recurrence. Takes torsemide. Has declined statin therapy. Hypothyroidism follow-up  Reports no fatigue  denies heat/cold intolerance, bowel/skin changes or CVS symptoms, losing hair, feeling excessive energy, tremulousness, palpitations. Thyroid medication has been unchanged since last medication check and labs.    Lab Results   Component Value Date/Time    TSH 0.34 (L) 08/14/2020 04:02 PM    T4, Free 1.5 08/14/2020 04:02 PM     Wt Readings from Last 3 Encounters:   08/14/20 225 lb 3.2 oz (102.2 kg)   02/27/20 222 lb (100.7 kg)   11/21/19 226 lb (102.5 kg)     Reports rash of her left ankle and foot and leg. It is on lower extremity. Mildly itchy. Scaling. Has tried some topical cortisone. Onset was about a month ago. Not painful. No weeping. History     Past Medical History:   Diagnosis Date    (HFpEF) heart failure with preserved ejection fraction ([facility] 05/22/2012    first presented 2012 in the setting of sepsis    Anemia     hx b12 def age 22-31s    Atrial fibrillation ([facility] 2018    Cholecystitis 2018    COPD (chronic obstructive pulmonary disease) ([facility] [facility] 7/2011    FEV1 1 L 7/2011. Dr. Alba Faulkner GERD (gastroesophageal reflux disease)     Glaucoma suspect of both eyes     Dr Imani Camarena    Hemorrhoids     Migraine     Mixed stress and urge urinary incontinence     Neuropathy of both feet 06/2019    consult Dr. Michael Hogan.  OA (osteoarthritis) of knee     R, Dr. Kate Mcguire. Euflexa injections x3. has walker    Obesity (BMI 30.0-34.9) 9/7/2018    Osteoporosis     declines medication tx    Other postablative hypothyroidism 1987    ROSAS 1987  saw Dr. Genaro Fleming Paresthesia of foot, bilateral     Psoriasis     left ear    PSVT (paroxysmal supraventricular tachycardia) ([facility] 5/19/2012    Dr. Jj Cummings Pulmonary nodules 7/2011    RLL x2.  stable 2/2012  Dr. 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 Imani Camarena, bilaterally    HX CHOLECYSTECTOMY  09/08/2018    HX COLONOSCOPY  6/28/12    2 polyps.  Dr. Elliot Wade    menorrhagia    HX OTHER SURGICAL      Echo 7/31/14 - LVEF 60-65%, grade 1 DD    HX TONSILLECTOMY      NM CARDIAC SPECT W STRS/REST MULT  7/18/11    small sized fixed defect in inferolateral wall may represent scar or abd attenuation; EF 80%    VAS LOWER EXT ART PVR MULT LEVEL SEG PRESSURES  7/18/11    normal       Current Outpatient Medications   Medication Sig    clotrimazole-betamethasone (LOTRISONE) topical cream Apply  to affected area two (2) times a day. For fungal rash of foot    tolterodine (DETROL) 2 mg tablet TAKE 1 TABLET BY MOUTH TWICE A DAY    apixaban (Eliquis) 5 mg tablet TAKE 1 TABLET BY MOUTH TWICE A DAY    metoprolol succinate (TOPROL-XL) 50 mg XL tablet Take 1 Tab by mouth daily.  acetaminophen (TYLENOL ARTHRITIS PAIN) 650 mg TbER Take 650 mg by mouth every eight (8) hours. One in the morning, one at night    SYNTHROID 150 mcg tablet Take 1 Tab by mouth Daily (before breakfast). Except DO NOT TAKE on Sundays    torsemide (DEMADEX) 10 mg tablet TAKE 1 TABLET BY MOUTH EVERY DAY    Vit A,C,E-Zinc-Copper (PRESERVISION AREDS) cap capsule Take 1 Cap by mouth two (2) times a day.  cholecalciferol (VITAMIN D3) 1,000 unit cap Take 1,000 Units by mouth daily.  albuterol (PROVENTIL HFA) 90 mcg/actuation inhaler Take 2 Puffs by inhalation every four (4) hours as needed for Wheezing. (Patient taking differently: Take 2 Puffs by inhalation as needed for Wheezing.)     No current facility-administered medications for this visit.         Allergies   Allergen Reactions    Ciprofloxacin (Bulk) Rash    Diltiazem Rash    Piperacillin Other (comments)     Appears to have tolerated 5/13/12-5/23/12       Family History   Problem Relation Age of Onset    Cancer Maternal Grandmother         breast    Cancer Sister         lymphopma    Heart Disease Father     Heart Disease Paternal Grandfather     Seizures Sister     Arthritis-osteo Mother     Dementia Mother     Malignant Hyperthermia Neg Hx     Pseudocholinesterase Deficiency Neg Hx     Delayed Awakening Neg Hx     Post-op Nausea/Vomiting Neg Hx     Emergence Delirium Neg Hx     Post-op Cognitive Dysfunction Neg Hx     Other Neg Hx         reports that she quit smoking about 8 years ago. Her smoking use included cigarettes. She has a 60.00 pack-year smoking history. She has never used smokeless tobacco.   reports no history of alcohol use. Depression Risk Factor Screening:       Alcohol Risk Factor Screening: On any occasion during the past 3 months, have you had more than 3 drinks containing alcohol? No    Do you average more than 14 drinks per week? No      Functional Ability and Level of Safety:     Hearing Loss   mild    Activities of Daily Living   Self-care. Requires assistance with: no ADLs    Fall Risk     Fall Risk Assessment, last 12 mths 11/21/2019   Able to walk? Yes   Fall in past 12 months? No   Fall with injury? -         Abuse Screen   Patient is not abused    Review of Systems   A comprehensive review of systems was negative except for that written in the HPI. Physical Examination     Evaluation of Cognitive Function:  Mood/affect:  neutral, happy  Appearance: age appropriate  Family member/caregiver input: none    Blood pressure 117/74, pulse 75, temperature 97.3 °F (36.3 °C), temperature source Oral, resp. rate 12, height 5' 10\" (1.778 m), weight 225 lb 3.2 oz (102.2 kg), SpO2 93 %. General appearance: alert, cooperative, no distress, appears stated age  Neck: supple, symmetrical, trachea midline, no adenopathy, thyroid: not enlarged, symmetric, no tenderness/mass/nodules, no carotid bruit and no JVD  Lungs: clear to auscultation bilaterally  Antalgic gait. Using rolling walker. Some paraspinal and muscle tenderness of right lower spine. No overt weakness. Heart: regular rate and rhythm, S1, S2 normal, no murmur, click, rub or gallop  Extremities: extremities normal, atraumatic, no cyanosis or edema  Scaly circular rash of left lower extremity of lateral ankle and somewhat of the inner leg.     Patient Care Team:  Lindsey Gallegos MD as PCP - General (Internal Medicine)  Lindsey Gallegos, MD as PCP - [facility] Provider  Buffy Kumari MD (Cardiology)      Advice/Referrals/Counseling   Education and counseling provided. See below for specific orders    Assessment/Plan   Diagnoses and all orders for this visit:    1. Medicare annual wellness visit, subsequent  Recommend Shingrix vaccine, glaucoma screening, influenza vaccine. 2. Acute right-sided low back pain without sciatica  This is very likely musculoskeletal pain. Would benefit from physical therapy. Given risk of COVID-19 infection and difficulty with ambulation, will try to get home physical therapy scheduled. Stretching demonstrated. She is a fall risk. History of osteoporosis. Significant fall would be debilitating and possibly life-threatening. History of UTI. No overt symptoms at this time, but she does have chronic urinary incontinence and back pain. She does not appear septic or ill.  -     URINALYSIS W/ RFLX MICROSCOPIC; Future  -     REFERRAL TO PHYSICAL THERAPY    3. Acquired hypothyroidism  TSH remains borderline low, but T4 is stable. Continue current dosing of 150 mcg 6 days/week. -     TSH 3RD GENERATION; Future  -     T4, FREE; Future    4. CKD (chronic kidney disease), stage III ([address]  Unclear current status. Repeat. Taking diuretics. -     METABOLIC PANEL, COMPREHENSIVE; Future  -     CBC WITH AUTOMATED DIFF; Future    5. PAF (paroxysmal atrial fibrillation) (HCC)  Remains asymptomatic. Continue beta-blocker and Eliquis. Has follow-up with cardiology. 6. Chronic heart failure with preserved ejection fraction (HCC)  Remains stable and asymptomatic. Continue current medications. 7. Tinea pedis of left foot  Ringworm type rash. Trial of Lotrisone for 2 weeks. -     clotrimazole-betamethasone (LOTRISONE) topical cream; Apply  to affected area two (2) times a day. For fungal rash of foot    8. Neuropathy of both feet  9. Primary osteoarthritis of both knees  10.  Impaired functional mobility, balance, gait, and endurance  In addition to back pain above, she could benefit from home physical therapy for gait, balance, falls risk reduction, home safety evaluation. Will refer to [facility]. High fall risk. Significant risk of permanent debility or death with falls.  -     REFERRAL TO PHYSICAL THERAPY    . Potential medication side effects were discussed with the patient; let me know if any occur.   Return for yearly Annual Wellness Visits

## 2020-08-19 DIAGNOSIS — E03.9 ACQUIRED HYPOTHYROIDISM: Chronic | ICD-10-CM

## 2020-08-19 RX ORDER — LEVOTHYROXINE SODIUM 150 MCG
TABLET ORAL
Qty: 90 TAB | Refills: 3 | Status: SHIPPED | OUTPATIENT
Start: 2020-08-19 | End: 2021-04-21 | Stop reason: DRUGHIGH

## 2020-08-20 DIAGNOSIS — I50.32 CHRONIC DIASTOLIC HEART FAILURE (HCC): ICD-10-CM

## 2020-08-20 RX ORDER — TORSEMIDE 10 MG/1
TABLET ORAL
Qty: 90 TAB | Refills: 2 | Status: SHIPPED | OUTPATIENT
Start: 2020-08-20 | End: 2021-07-19

## 2020-08-20 NOTE — TELEPHONE ENCOUNTER
Per Dr. Mitchell Challenger VO:  LOV:2/27/20  Future Appointments   Date Time Provider Melania Samuel   3/11/2021  1:20 PM Vinay Kendrick MD [facility]     Requested Prescriptions     Pending Prescriptions Disp Refills    torsemide (DEMADEX) 10 mg tablet 90 Tab 3     Sig: TAKE 1 TABLET BY MOUTH EVERY DAY

## 2020-08-22 DIAGNOSIS — N17.9 AKI (ACUTE KIDNEY INJURY) (HCC): ICD-10-CM

## 2020-08-22 DIAGNOSIS — N18.30 CKD (CHRONIC KIDNEY DISEASE), STAGE III (HCC): Primary | Chronic | ICD-10-CM

## 2020-08-22 DIAGNOSIS — N30.00 ACUTE CYSTITIS WITHOUT HEMATURIA: ICD-10-CM

## 2020-08-22 RX ORDER — CEPHALEXIN 500 MG/1
500 CAPSULE ORAL 3 TIMES DAILY
Qty: 21 CAP | Refills: 0 | Status: SHIPPED | OUTPATIENT
Start: 2020-08-22 | End: 2020-09-25 | Stop reason: ALTCHOICE

## 2020-08-26 ENCOUNTER — HOSPITAL ENCOUNTER (OUTPATIENT)
Dept: CT IMAGING | Age: 85
Discharge: HOME | End: 2020-08-26
Attending: INTERNAL MEDICINE
Payer: MEDICARE

## 2020-08-26 DIAGNOSIS — N17.9 AKI (ACUTE KIDNEY INJURY) (HCC): ICD-10-CM

## 2020-08-26 DIAGNOSIS — N30.00 ACUTE CYSTITIS WITHOUT HEMATURIA: ICD-10-CM

## 2020-08-26 DIAGNOSIS — N18.30 CKD (CHRONIC KIDNEY DISEASE), STAGE III (HCC): Chronic | ICD-10-CM

## 2020-08-26 PROCEDURE — 74176 CT ABD & PELVIS W/O CONTRAST: CPT

## 2020-09-17 ENCOUNTER — TELEPHONE (OUTPATIENT)
Dept: INTERNAL MEDICINE CLINIC | Age: 85
End: 2020-09-17

## 2020-09-17 NOTE — TELEPHONE ENCOUNTER
----- Message from Gircelda Hinson sent at 9/16/2020  4:22 PM EDT -----  Regarding: /telephone  Caller's first and last name: pt  Reason for call: Questions about a possible UTI  Callback required yes/no and why: yes  Best contact number(s): [phone] or cell [phone]  Details to clarify the request: Pt thought she saw blood in urine on Monday night. She previously saw provider for UTI on  8/14/20.

## 2020-09-22 NOTE — TELEPHONE ENCOUNTER
Nurse spoke with patient over the phone; patient states that the blood she noticed in her urine last week has stopped, but she still wants to come in for her follow-up appt on Friday 9/25/20 at 1300pm b/c she is on elaquis. Patient reports that she is receiving frequent physical therapy at home for sciatica in her hips & the physical therapy is going well, but she has been experiencing some additional stomach pain which she will ask PCP about on Friday. Patient requesting a high dose flu shot at the time of her appt as well. Patient verbalized appreciation of call.

## 2020-09-25 ENCOUNTER — OFFICE VISIT (OUTPATIENT)
Dept: INTERNAL MEDICINE CLINIC | Age: 85
End: 2020-09-25
Payer: MEDICARE

## 2020-09-25 VITALS
TEMPERATURE: 98.1 F | DIASTOLIC BLOOD PRESSURE: 81 MMHG | RESPIRATION RATE: 18 BRPM | BODY MASS INDEX: 32.21 KG/M2 | SYSTOLIC BLOOD PRESSURE: 134 MMHG | WEIGHT: 225 LBS | HEART RATE: 84 BPM | OXYGEN SATURATION: 94 % | HEIGHT: 70 IN

## 2020-09-25 DIAGNOSIS — Z23 NEEDS FLU SHOT: ICD-10-CM

## 2020-09-25 DIAGNOSIS — R31.9 HEMATURIA, UNSPECIFIED TYPE: Primary | ICD-10-CM

## 2020-09-25 DIAGNOSIS — R79.89 ELEVATED SERUM CREATININE: ICD-10-CM

## 2020-09-25 DIAGNOSIS — I50.32 CHRONIC HEART FAILURE WITH PRESERVED EJECTION FRACTION (HCC): ICD-10-CM

## 2020-09-25 DIAGNOSIS — G57.93 NEUROPATHY OF BOTH FEET: ICD-10-CM

## 2020-09-25 DIAGNOSIS — N18.30 CKD (CHRONIC KIDNEY DISEASE), STAGE III (HCC): ICD-10-CM

## 2020-09-25 LAB
BILIRUB UR QL STRIP: NEGATIVE
GLUCOSE UR-MCNC: NEGATIVE MG/DL
KETONES P FAST UR STRIP-MCNC: NEGATIVE MG/DL
PH UR STRIP: 7 [PH] (ref 4.6–8)
PROT UR QL STRIP: ABNORMAL
SP GR UR STRIP: 1.02 (ref 1–1.03)
UA UROBILINOGEN AMB POC: ABNORMAL (ref 0.2–1)
URINALYSIS CLARITY POC: ABNORMAL
URINALYSIS COLOR POC: YELLOW
URINE BLOOD POC: ABNORMAL
URINE LEUKOCYTES POC: ABNORMAL
URINE NITRITES POC: POSITIVE

## 2020-09-25 PROCEDURE — G8432 DEP SCR NOT DOC, RNG: HCPCS | Performed by: INTERNAL MEDICINE

## 2020-09-25 PROCEDURE — G8536 NO DOC ELDER MAL SCRN: HCPCS | Performed by: INTERNAL MEDICINE

## 2020-09-25 PROCEDURE — G0463 HOSPITAL OUTPT CLINIC VISIT: HCPCS | Performed by: INTERNAL MEDICINE

## 2020-09-25 PROCEDURE — 90471 IMMUNIZATION ADMIN: CPT

## 2020-09-25 PROCEDURE — 1090F PRES/ABSN URINE INCON ASSESS: CPT | Performed by: INTERNAL MEDICINE

## 2020-09-25 PROCEDURE — 99214 OFFICE O/P EST MOD 30 MIN: CPT | Performed by: INTERNAL MEDICINE

## 2020-09-25 PROCEDURE — G8417 CALC BMI ABV UP PARAM F/U: HCPCS | Performed by: INTERNAL MEDICINE

## 2020-09-25 PROCEDURE — 81003 URINALYSIS AUTO W/O SCOPE: CPT | Performed by: INTERNAL MEDICINE

## 2020-09-25 PROCEDURE — 1101F PT FALLS ASSESS-DOCD LE1/YR: CPT | Performed by: INTERNAL MEDICINE

## 2020-09-25 PROCEDURE — G8427 DOCREV CUR MEDS BY ELIG CLIN: HCPCS | Performed by: INTERNAL MEDICINE

## 2020-09-25 NOTE — PROGRESS NOTES
1350pm Vaccination order for Flu AD (>65yrs old) entered into patient's chart by provider. Nurse obtained vaccine order, vaccine injectable medication, applicable Vaccine Information Sheet (VIS), & other necessary supplies for vaccine administration. Nurse entered patient's exam room, introduced herself to patient, pulled patient's chart up in [address] obtained/ confirmed 2 patient identifiers from patient. Nurse reviewed vaccine consent form with patient & patient verbalized that Flu AD (>65yrs old) vaccine has been requested; this information matched the provider's written order. Nurse provided patient with a copy of the vaccine VIS & gave patient a chance to review VIS while nurse prepped vaccine in room (after performing hand hygiene & donning gloves (facemask already in place)). Nurse reviewed the following with patient: possible vaccine side effects, potential adverse reactions & steps to take should adverse reactions occur. Nurse provided patient with an opportunity to ask questions about the vaccine after review of VIS; patient did not have any questions. Patient verbalized understanding of items discussed, patient signed consent form & patient verbalized consent to receive Flu AD (>65yrs old) vaccine administered in left deltoid. Nurse administered vaccine following vaccine administration policy & procedure. Patient tolerated vaccine administration well. Nurse reminded patient that there is a doctor on call after hours & on weekends, if adverse reactions from vaccine develop. Patient verbalized understanding, agreement & appreciation of nurse's assistance. After vaccine administration complete, patient ambulated with rollator out of office independently & in no apparent distress. Nurse cleaned room & nurse completed vaccine administration documentation.

## 2020-09-25 NOTE — PROGRESS NOTES
HISTORY OF PRESENT ILLNESS    Chief Complaint   Patient presents with    Blood in Urine     blood in urine last week but cleared up per patient       Presents for follow-up of abnormal labs. Elevated creatinine  Lab Results   Component Value Date/Time    Creatinine (POC) 1.2 07/06/2012 12:57 PM    Creatinine 1.82 (H) 08/14/2020 04:02 PM     We decreased bumex to every other day in august and she did this until Monday 9/20 when she noted some worsening edema, so now is taking it daily again. F/u UTI. Took keflex 8/22/20. No culture. Denies frequency, dysuria. Had possible blood in urine last week. Currently asymptomatic  POC UA today showed trace blood, + Nit, large LE    CT 8/26/20  No stones  Bladder: Locules of gas in the anterior dependent bladder    Bilateral back pains. Seeing PT at home, [facility] PT. Review of Systems   All other systems reviewed and are negative, except as noted in HPI    Past Medical and Surgical History   has a past medical history of (HFpEF) heart failure with preserved ejection fraction ([address] (05/22/2012), Anemia, Atrial fibrillation ([address] (2018), Cholecystitis (2018), COPD (chronic obstructive pulmonary disease) ([address] (7/2011), GERD (gastroesophageal reflux disease), Glaucoma suspect of both eyes, Hemorrhoids, Migraine, Mixed stress and urge urinary incontinence, Neuropathy of both feet (06/2019), OA (osteoarthritis) of knee, Obesity (BMI 30.0-34.9) (9/7/2018), Osteoporosis, Other postablative hypothyroidism (1987), Paresthesia of foot, bilateral, Psoriasis, PSVT (paroxysmal supraventricular tachycardia) ([address] (5/19/2012), Pulmonary nodules (7/2011), Sepsis, unspecified (5/14/2012), 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    has a past surgical history that includes hx tonsillectomy; hx hysterectomy (1974); hx cataract removal; echo 2d adult (7/15/11); nm cardiac spect w strs/rest mult (7/18/11); vas lower ext art pvr mult level seg pressures (7/18/11); echo 2d adult (5/19/12); hx colonoscopy (6/28/12); hx other surgical; and hx cholecystectomy (09/08/2018). reports that she quit smoking about 9 years ago. Her smoking use included cigarettes. She has a 60.00 pack-year smoking history. She has never used smokeless tobacco. She reports that she does not drink alcohol or use drugs. family history includes Arthritis-osteo in her mother; Cancer in her maternal grandmother and sister; Dementia in her mother; Heart Disease in her father and paternal grandfather; Seizures in her sister. Physical Exam   Nursing note and vitals reviewed. Blood pressure 134/81, pulse 84, temperature 98.1 °F (36.7 °C), temperature source Oral, resp. rate 18, height 5' 10\" (1.778 m), weight 225 lb (102.1 kg), SpO2 94 %. Constitutional:  No distress. Eyes: Conjunctivae are normal.   Ears:  Hearing grossly intact  Cardiovascular: Normal rate. regular rhythm, no murmurs or gallops  No edema  Pulmonary/Chest: Effort normal.   CTAB  Musculoskeletal: moves all 4 extremities   Neurological: Alert and oriented to person, place, and time. Skin: No rash noted. Psychiatric: Normal mood and affect. Behavior is normal.     ASSESSMENT and PLAN  Diagnoses and all orders for this visit:    1. Hematuria, unspecified type  Urinalysis today is consistent with possible urinary tract infection but patient is asymptomatic. May just be colonization. Check urine culture and treat accordingly based on results. -     CULTURE, URINE; Future  -     AMB POC URINALYSIS DIP STICK AUTO W/O MICRO    2. Elevated serum creatinine  -     METABOLIC PANEL, BASIC; Future  3. CKD (chronic kidney disease), stage III ([address]  Repeat renal function today. May need to decrease Bumex to every other day again. 4. Chronic heart failure with preserved ejection fraction ([address]  Appears well compensated. Taking Bumex daily for the past week and weight is stable.   May be able to tolerate lower doses and will need to do that if her creatinine is elevated again. 5. Neuropathy of both feet  Persistent symptoms. Saw neurology June 2019. Was recommended to have an EMG possible consideration of nerve biopsy to look for small fiber neuropathy. Symptoms are stable and she did not start trial of Cymbalta because of fear of side effects as recommended. She still has Cymbalta and will start it if she would like. Can follow-up with neurology for diagnostic work-up, but I do not think it would 's, so trial of Cymbalta alone is reasonable. 6. Needs flu shot  -     FLU (FLUAD QUAD INFLUENZA VACCINE,QUAD,ADJUVANTED)        lab results and schedule of future lab studies reviewed with patient  reviewed medications and side effects in detail    Return to clinic for further evaluation if new symptoms develop        Current Outpatient Medications   Medication Sig    torsemide (DEMADEX) 10 mg tablet TAKE 1 TABLET BY MOUTH EVERY DAY    Synthroid 150 mcg tablet Take 1 pill 6 days per week. DO NOT TAKE on Sundays    tolterodine (DETROL) 2 mg tablet TAKE 1 TABLET BY MOUTH TWICE A DAY    apixaban (Eliquis) 5 mg tablet TAKE 1 TABLET BY MOUTH TWICE A DAY    metoprolol succinate (TOPROL-XL) 50 mg XL tablet Take 1 Tab by mouth daily.  acetaminophen (TYLENOL ARTHRITIS PAIN) 650 mg TbER Take 650 mg by mouth every eight (8) hours. One in the morning, one at night    Vit A,C,E-Zinc-Copper (PRESERVISION AREDS) cap capsule Take 1 Cap by mouth two (2) times a day.  cholecalciferol (VITAMIN D3) 1,000 unit cap Take 1,000 Units by mouth daily.  albuterol (PROVENTIL HFA) 90 mcg/actuation inhaler Take 2 Puffs by inhalation every four (4) hours as needed for Wheezing. (Patient taking differently: Take 2 Puffs by inhalation as needed for Wheezing.)     No current facility-administered medications for this visit.

## 2020-09-27 LAB
ANION GAP SERPL CALC-SCNC: 4 MMOL/L (ref 5–15)
BUN SERPL-MCNC: 26 MG/DL (ref 6–20)
BUN/CREAT SERPL: 18 (ref 12–20)
CALCIUM SERPL-MCNC: 9.1 MG/DL (ref 8.5–10.1)
CHLORIDE SERPL-SCNC: 107 MMOL/L (ref 97–108)
CO2 SERPL-SCNC: 29 MMOL/L (ref 21–32)
CREAT SERPL-MCNC: 1.46 MG/DL (ref 0.55–1.02)
GLUCOSE SERPL-MCNC: 83 MG/DL (ref 65–100)
POTASSIUM SERPL-SCNC: 4.7 MMOL/L (ref 3.5–5.1)
SODIUM SERPL-SCNC: 140 MMOL/L (ref 136–145)

## 2020-09-29 LAB
BACTERIA SPEC CULT: ABNORMAL
CC UR VC: ABNORMAL
SERVICE CMNT-IMP: ABNORMAL

## 2020-09-29 RX ORDER — CEFUROXIME AXETIL 500 MG/1
500 TABLET ORAL 2 TIMES DAILY
Qty: 14 TAB | Refills: 0 | Status: SHIPPED | OUTPATIENT
Start: 2020-09-29 | End: 2021-04-16 | Stop reason: ALTCHOICE

## 2020-11-10 ENCOUNTER — TELEPHONE (OUTPATIENT)
Dept: INTERNAL MEDICINE CLINIC | Age: 85
End: 2020-11-10

## 2020-11-13 DIAGNOSIS — I50.32 CHRONIC DIASTOLIC HEART FAILURE (HCC): ICD-10-CM

## 2020-11-16 RX ORDER — TORSEMIDE 10 MG/1
TABLET ORAL
Qty: 90 TAB | Refills: 2 | OUTPATIENT
Start: 2020-11-16

## 2020-12-15 LAB — TSH SERPL DL<=0.05 MIU/L-ACNC: NORMAL UIU/ML (ref 0.35–5.5)

## 2020-12-21 NOTE — PROGRESS NOTES
I spoke with Sherri who states our  in the office put the wrong put the wrong pts information on the specimen.  This result in this chart isn't correct which is why [facility] lab wrote to please disregard results

## 2020-12-28 NOTE — PROGRESS NOTES
This was taken care of, this pt didn't this done. Our lab placed this pt's name on another providers pt specimen.

## 2021-02-22 DIAGNOSIS — N39.46 MIXED STRESS AND URGE URINARY INCONTINENCE: ICD-10-CM

## 2021-02-22 RX ORDER — TOLTERODINE TARTRATE 2 MG/1
TABLET, EXTENDED RELEASE ORAL
Qty: 180 TAB | Refills: 1 | Status: SHIPPED | OUTPATIENT
Start: 2021-02-22 | End: 2021-07-18

## 2021-02-22 NOTE — PROGRESS NOTES
[facility] arrived to transport patient. Provided basic information, face sheet and packet to be given to [facility]. [phone]

## 2021-03-04 RX ORDER — METOPROLOL SUCCINATE 50 MG/1
TABLET, EXTENDED RELEASE ORAL
Qty: 90 TAB | Refills: 0 | Status: SHIPPED | OUTPATIENT
Start: 2021-03-04 | End: 2021-05-18 | Stop reason: SDUPTHER

## 2021-03-04 NOTE — TELEPHONE ENCOUNTER
Per VO of Dr. Leona Kennedy    Needs to keep 3/11 appointment for refill.      LOV: 2/27/2020    Future Appointments   Date Time Provider Melania Samuel   3/11/2021  1:20 PM Ana Rosa Clarke MD [facility]       Requested Prescriptions     Signed Prescriptions Disp Refills    metoprolol succinate (TOPROL-XL) 50 mg XL tablet 90 Tab 0     Sig: TAKE 1 TABLET BY MOUTH EVERY DAY     Authorizing Provider: Lien Babcock     Ordering User: Justin Overton

## 2021-03-11 ENCOUNTER — OFFICE VISIT (OUTPATIENT)
Dept: CARDIOLOGY CLINIC | Age: 86
End: 2021-03-11
Payer: MEDICARE

## 2021-03-11 VITALS
OXYGEN SATURATION: 96 % | SYSTOLIC BLOOD PRESSURE: 138 MMHG | DIASTOLIC BLOOD PRESSURE: 70 MMHG | WEIGHT: 233 LBS | HEART RATE: 76 BPM | BODY MASS INDEX: 33.36 KG/M2 | HEIGHT: 70 IN | RESPIRATION RATE: 18 BRPM

## 2021-03-11 DIAGNOSIS — R60.9 EDEMA, UNSPECIFIED TYPE: ICD-10-CM

## 2021-03-11 DIAGNOSIS — I48.0 PAF (PAROXYSMAL ATRIAL FIBRILLATION) (HCC): ICD-10-CM

## 2021-03-11 DIAGNOSIS — I47.1 PSVT (PAROXYSMAL SUPRAVENTRICULAR TACHYCARDIA) (HCC): ICD-10-CM

## 2021-03-11 DIAGNOSIS — I50.32 CHRONIC DIASTOLIC HEART FAILURE (HCC): Primary | Chronic | ICD-10-CM

## 2021-03-11 PROCEDURE — G0463 HOSPITAL OUTPT CLINIC VISIT: HCPCS | Performed by: SPECIALIST

## 2021-03-11 PROCEDURE — 93010 ELECTROCARDIOGRAM REPORT: CPT | Performed by: SPECIALIST

## 2021-03-11 PROCEDURE — 93005 ELECTROCARDIOGRAM TRACING: CPT | Performed by: SPECIALIST

## 2021-03-11 PROCEDURE — G8536 NO DOC ELDER MAL SCRN: HCPCS | Performed by: SPECIALIST

## 2021-03-11 PROCEDURE — 1101F PT FALLS ASSESS-DOCD LE1/YR: CPT | Performed by: SPECIALIST

## 2021-03-11 PROCEDURE — G8427 DOCREV CUR MEDS BY ELIG CLIN: HCPCS | Performed by: SPECIALIST

## 2021-03-11 PROCEDURE — 1090F PRES/ABSN URINE INCON ASSESS: CPT | Performed by: SPECIALIST

## 2021-03-11 PROCEDURE — G8432 DEP SCR NOT DOC, RNG: HCPCS | Performed by: SPECIALIST

## 2021-03-11 PROCEDURE — 99214 OFFICE O/P EST MOD 30 MIN: CPT | Performed by: SPECIALIST

## 2021-03-11 PROCEDURE — G8417 CALC BMI ABV UP PARAM F/U: HCPCS | Performed by: SPECIALIST

## 2021-03-11 RX ORDER — FLUTICASONE FUROATE AND VILANTEROL TRIFENATATE 100; 25 UG/1; UG/1
POWDER RESPIRATORY (INHALATION)
COMMUNITY
Start: 2021-02-09

## 2021-03-11 NOTE — PROGRESS NOTES
Ketan Woods MD. [facility]              Patient: Miriam Raza  : 1932      Today's Date: 3/11/2021            HISTORY OF PRESENT ILLNESS:     History of Present Illness:  She is doing fair. She has a neuropathy which bother her. Her breathing seems to be OK. Getting PT at home. PAST MEDICAL HISTORY:     Past Medical History:   Diagnosis Date    (HFpEF) heart failure with preserved ejection fraction ([address] 2012    Dr Ruth Forte.  first presented  in the setting of sepsis    Anemia     hx b12 def age 22-31s    Atrial fibrillation ([address]     Dr Rosalee Adam Cholecystitis 2018    COPD (chronic obstructive pulmonary disease) ([address] 2011    FEV1 1 L 2011. Dr. Pedro Pablo Shah GERD (gastroesophageal reflux disease)     Glaucoma suspect of both eyes     Dr Debria Baumgarten    Hemorrhoids     Migraine     Mixed stress and urge urinary incontinence     Neuropathy of both feet 2019    consult Dr. Danny Gallegos.  OA (osteoarthritis) of knee     R, Dr. Arsh Humphreys. Euflexa injections x3. has walker    Obesity (BMI 30.0-34.9) 2018    Osteoporosis     declines medication tx    Other postablative hypothyroidism     ROSAS   saw Dr. Nas Santana Paresthesia of foot, bilateral     Psoriasis     left ear    PSVT (paroxysmal supraventricular tachycardia) ([facility] 2012    Dr. Rosalee Adam Pulmonary nodules 2011    RLL x2.  stable 2012  Dr. 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 Debria Baumgarten, bilaterally    HX CHOLECYSTECTOMY  2018    HX COLONOSCOPY  12    2 polyps.  Dr. Marychuy Conrad menorrhagia    HX OTHER SURGICAL      Echo 7/31/14 - LVEF 60-65%, grade 1 DD    HX TONSILLECTOMY      NM CARDIAC SPECT W STRS/REST MULT  7/18/11    small sized fixed defect in inferolateral wall may represent scar or abd attenuation; EF 80%    VAS LOWER EXT ART PVR MULT LEVEL SEG PRESSURES  7/18/11    normal           MEDICATIONS:     Current Outpatient Medications   Medication Sig Dispense Refill    metoprolol succinate (TOPROL-XL) 50 mg XL tablet TAKE 1 TABLET BY MOUTH EVERY DAY 90 Tab 0    tolterodine (DETROL) 2 mg tablet Take 1 tablet by mouth twice daily 180 Tab 1    torsemide (DEMADEX) 10 mg tablet TAKE 1 TABLET BY MOUTH EVERY DAY 90 Tab 2    Synthroid 150 mcg tablet Take 1 pill 6 days per week. DO NOT TAKE on Sundays 90 Tab 3    apixaban (Eliquis) 5 mg tablet TAKE 1 TABLET BY MOUTH TWICE A  Tab 3    acetaminophen (TYLENOL ARTHRITIS PAIN) 650 mg TbER Take 650 mg by mouth every eight (8) hours. One in the morning, one at night      Vit A,C,E-Zinc-Copper (PRESERVISION AREDS) cap capsule Take 1 Cap by mouth two (2) times a day.  cholecalciferol (VITAMIN D3) 1,000 unit cap Take 1,000 Units by mouth daily.  albuterol (PROVENTIL HFA) 90 mcg/actuation inhaler Take 2 Puffs by inhalation every four (4) hours as needed for Wheezing. (Patient taking differently: Take 2 Puffs by inhalation as needed for Wheezing.) 1 Inhaler 5    Breo Ellipta 100-25 mcg/dose inhaler TAKE 1 PUFF BY MOUTH EVERY DAY. RINSE MOUTH AFTER EACH USE      cefUROXime (CEFTIN) 500 mg tablet Take 1 Tab by mouth two (2) times a day.  Indications: urinary tract infection caused by Klebsiella bacteria 14 Tab 0       Allergies   Allergen Reactions    Ciprofloxacin (Bulk) Rash    Diltiazem Rash    Piperacillin Other (comments)     Appears to have tolerated 5/13/12-5/23/12             SOCIAL HISTORY:     Social History     Tobacco Use    Smoking status: Former Smoker     Packs/day: 1.00     Years: 60.00     Pack years: 60. 00     Types: Cigarettes     Quit date: 2011     Years since quittin.5    Smokeless tobacco: Never Used   Substance Use Topics    Alcohol use: No     Alcohol/week: 0.0 standard drinks    Drug use: No         FAMILY HISTORY:     Family History   Problem Relation Age of Onset    Cancer Maternal Grandmother         breast    Cancer Sister         lymphopma    Heart Disease Father     Heart Disease Paternal Grandfather     Seizures Sister     Arthritis-osteo Mother     Dementia Mother     Malignant Hyperthermia Neg Hx     Pseudocholinesterase Deficiency Neg Hx     Delayed Awakening Neg Hx     Post-op Nausea/Vomiting Neg Hx     Emergence Delirium Neg Hx     Post-op Cognitive Dysfunction Neg Hx     Other Neg Hx           REVIEW OF SYSTEMS:       Review of Systems:    Constitutional: Negative for fever, chills    HEENT: Negative for vision changes.    Respiratory: + occ cough;  GARCIA  Cardiovascular: Negative for orthopnea, syncope, and PND.    Gastrointestinal: Negative for abdominal pain, diarrhea, or melena    Genitourinary: Negative for dysuria  + urinary incontinence at times   Musculoskeletal:  + joint pain    Skin: Negative for rash    Heme: No problems bleeding.    Neurological: + leg neuropathy               PHYSICAL EXAM:       Physical Exam:   Visit Vitals  /70 (BP 1 Location: Left upper arm, BP Patient Position: Sitting, BP Cuff Size: Large adult)   Pulse 76   Resp 18   Ht 5' 10\" (1.778 m)   Wt 233 lb (105.7 kg)   LMP  (LMP Unknown)   SpO2 96%   BMI 33.43 kg/m²       Patient appears generally well, mood and affect are appropriate and pleasant.    HEENT: Normocephalic, atraumatic.    Neck Exam: Supple, No JVD sitting up   Lung Exam: Clear to auscultation, even breath sounds.    Cardiac Exam: Regular rate and rhythm with no murmur  Abdomen: Soft, non-tender, normal bowel sounds. + obese  Extremities: + mild bilat LE edema.   MAW  Neuro - non focal   Psych - appropriate affect           LABS / OTHER STUDIES:       Lab Results   Component Value Date/Time    Sodium 140 09/25/2020 02:20 PM    Potassium 4.7 09/25/2020 02:20 PM    Chloride 107 09/25/2020 02:20 PM    CO2 29 09/25/2020 02:20 PM    Anion gap 4 (L) 09/25/2020 02:20 PM    Glucose 83 09/25/2020 02:20 PM    BUN 26 (H) 09/25/2020 02:20 PM    Creatinine 1.46 (H) 09/25/2020 02:20 PM    BUN/Creatinine ratio 18 09/25/2020 02:20 PM    GFR est AA 41 (L) 09/25/2020 02:20 PM    GFR est non-AA 34 (L) 09/25/2020 02:20 PM    Calcium 9.1 09/25/2020 02:20 PM    Bilirubin, total 0.5 08/14/2020 04:02 PM    Alk. phosphatase 124 (H) 08/14/2020 04:02 PM    Protein, total 7.1 08/14/2020 04:02 PM    Albumin 3.7 08/14/2020 04:02 PM    Globulin 3.4 08/14/2020 04:02 PM    A-G Ratio 1.1 08/14/2020 04:02 PM    ALT (SGPT) 17 08/14/2020 04:02 PM    AST (SGOT) 15 08/14/2020 04:02 PM     Lab Results   Component Value Date/Time    WBC 6.4 08/14/2020 04:02 PM    Hemoglobin (POC) 11.2 (L) 05/11/2012 09:58 PM    HGB 13.2 08/14/2020 04:02 PM    Hematocrit (POC) 33 (L) 05/11/2012 09:58 PM    HCT 42.8 08/14/2020 04:02 PM    PLATELET 646 36/40/3576 04:02 PM    MCV 96.6 08/14/2020 04:02 PM       Lab Results   Component Value Date/Time    Cholesterol, total 217 (H) 10/03/2018 12:01 PM    HDL Cholesterol 62 10/03/2018 12:01 PM    LDL, calculated 135 (H) 10/03/2018 12:01 PM    VLDL, calculated 20 10/03/2018 12:01 PM    Triglyceride 99 10/03/2018 12:01 PM       Lab Results   Component Value Date/Time    TSH PLEASE DISREGARD RESULTS 10/02/2020 11:49 AM                     CARDIAC DIAGNOSTICS:       Cardiac Evaluation Includes:  Echo 2011 - EF 65%, mild LVH, mild PA HTN  Echo 5/2012 - EF 55 % to 60 %. There were no regional wall motion abnormalities.   Echo 7/31/14 - EF 60 % to 65 %.grade 1 diastolic dysfunction, mild TR,  Echo 9/10/18 - EF 60%, suboptimal visulization of wall motion, nml RV, mild pulm HTN, IVC dilated.      EKG 8/8/18 - Afib, , RBBB  EKG 9/9/18 - Atrial flutter, RBBB, HR 96  ECG 10/3/2018 - NSR, RBBB, PVC  EKG 2/27/20 - NSR, RBBB  EKG 3/11/21 - NSR, RBBB             ASSESSMENT AND PLAN:       Assessment and Plan:    1) Paroxysmal Afib / Atrial flutter  - newly diagnosed 9/7/18 when admitted with acute anam.  TSH was also low then.    - Is back in NSR on 10/3/18   - Given her risk of recurrent afib and high [id number] score of 4, I think long term [address] would be best   - continue BB and Eliquis   - Doing OK overall and is currently in sinus      2) Chronic LE Edema  (venous insufficiency) +/- HFpEF   - Has chronic complaints  of LE edema   - Problems due to Venous Insufficiency most likely  - Echo 7/31/14 - LVEF 60-65%, grade 1 DD  - I stopped verapamil in past and instead tried a beta-blocker (Toprol XL 50 mg daily), with mild improvement in symptoms    - Continue with leg elevation. Encourage support hose.     - Continue diuretic (torsemide) but would limit its use    - recheck an echo given her concerns of CHF          3) Chronic GARCIA    - due to COPD.    - Prior echo and stress test without major abnormalities. - she is getting PT and feeling a little better       4) Dyslipidemia  - she declined statin in past   - At age > 71 without CVD, benefit of statin for primary prevention is not clear     5) Neuropathy - Seeing Dr. Yohan Salazar       6) See me back in one year. Patient expressed understanding of the plan - questions were answered. Sister has lymphoma.  Has a daughter in [place]; step-daughter close by. 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          Emily Salinas MD, [address] [place] [address], [address]      [address] [facility]. [address] [place] [address], [address] [place] [address]  Ph: [phone]                               [phone]       ADDENDUM   3/24/2021  Echo 3/24/21 - [place]. LVEF 60-65%, mild LVH.  Grade 1 diastology     Will call       ADDENDUM   3/26/2021  I called and left a VM

## 2021-03-24 ENCOUNTER — ANCILLARY PROCEDURE (OUTPATIENT)
Dept: CARDIOLOGY CLINIC | Age: 86
End: 2021-03-24
Payer: MEDICARE

## 2021-03-24 VITALS
DIASTOLIC BLOOD PRESSURE: 70 MMHG | WEIGHT: 233 LBS | HEIGHT: 70 IN | BODY MASS INDEX: 33.36 KG/M2 | SYSTOLIC BLOOD PRESSURE: 132 MMHG

## 2021-03-24 DIAGNOSIS — R60.9 EDEMA, UNSPECIFIED TYPE: ICD-10-CM

## 2021-03-24 DIAGNOSIS — I47.1 PSVT (PAROXYSMAL SUPRAVENTRICULAR TACHYCARDIA) (HCC): ICD-10-CM

## 2021-03-24 DIAGNOSIS — I50.32 CHRONIC DIASTOLIC HEART FAILURE (HCC): ICD-10-CM

## 2021-03-24 DIAGNOSIS — I48.0 PAF (PAROXYSMAL ATRIAL FIBRILLATION) (HCC): ICD-10-CM

## 2021-03-24 LAB
ECHO AO ASC DIAM: 3.4 CM
ECHO AV MEAN GRADIENT: 3.68 MMHG
ECHO AV PEAK GRADIENT: 5.32 MMHG
ECHO AV PEAK VELOCITY: 115.34 CM/S
ECHO AV VTI: 24.19 CM
ECHO LA MAJOR AXIS: 3.92 CM
ECHO LA MINOR AXIS: 1.76 CM
ECHO LA VOL 2C: 42.52 ML (ref 22–52)
ECHO LA VOLUME INDEX A2C: 19.09 ML/M2 (ref 16–28)
ECHO LV E' LATERAL VELOCITY: 5.98 CM/S
ECHO LV E' SEPTAL VELOCITY: 5.89 CM/S
ECHO LV EDV A2C: 48.7 ML
ECHO LV EDV A4C: 60.4 ML
ECHO LV EDV BP: 54.66 ML (ref 56–104)
ECHO LV EDV INDEX A4C: 27.1 ML/M2
ECHO LV EDV INDEX BP: 24.5 ML/M2
ECHO LV EDV NDEX A2C: 21.9 ML/M2
ECHO LV EJECTION FRACTION A2C: 70 PERCENT
ECHO LV EJECTION FRACTION A4C: 59 PERCENT
ECHO LV EJECTION FRACTION BIPLANE: 62.1 PERCENT (ref 55–100)
ECHO LV ESV A2C: 14.62 ML
ECHO LV ESV A4C: 24.58 ML
ECHO LV ESV BP: 20.69 ML (ref 19–49)
ECHO LV ESV INDEX A2C: 6.6 ML/M2
ECHO LV ESV INDEX A4C: 11 ML/M2
ECHO LV ESV INDEX BP: 9.3 ML/M2
ECHO LV INTERNAL DIMENSION DIASTOLIC: 4.2 CM (ref 3.9–5.3)
ECHO LV INTERNAL DIMENSION SYSTOLIC: 2.85 CM
ECHO LV IVSD: 1.2 CM (ref 0.6–0.9)
ECHO LV MASS 2D: 182.5 G (ref 67–162)
ECHO LV MASS INDEX 2D: 82 G/M2 (ref 43–95)
ECHO LV POSTERIOR WALL DIASTOLIC: 1.24 CM (ref 0.6–0.9)
ECHO LVOT PEAK GRADIENT: 3.95 MMHG
ECHO LVOT PEAK VELOCITY: 99.34 CM/S
ECHO LVOT VTI: 21.6 CM
ECHO MV A VELOCITY: 111.76 CM/S
ECHO MV AREA PHT: 2.88 CM2
ECHO MV E DECELERATION TIME (DT): 263.82 MS
ECHO MV E VELOCITY: 62.51 CM/S
ECHO MV E/A RATIO: 0.56
ECHO MV E/E' LATERAL: 10.45
ECHO MV E/E' RATIO (AVERAGED): 10.53
ECHO MV E/E' SEPTAL: 10.61
ECHO MV PRESSURE HALF TIME (PHT): 76.51 MS
ECHO RV TAPSE: 1.87 CM (ref 1.5–2)

## 2021-03-24 PROCEDURE — 93306 TTE W/DOPPLER COMPLETE: CPT | Performed by: SPECIALIST

## 2021-03-24 RX ADMIN — PERFLUTREN 1.3 ML: 6.52 INJECTION, SUSPENSION INTRAVENOUS at 15:33

## 2021-04-16 ENCOUNTER — OFFICE VISIT (OUTPATIENT)
Dept: INTERNAL MEDICINE CLINIC | Age: 86
End: 2021-04-16
Payer: MEDICARE

## 2021-04-16 VITALS
DIASTOLIC BLOOD PRESSURE: 74 MMHG | TEMPERATURE: 97.4 F | RESPIRATION RATE: 12 BRPM | HEART RATE: 80 BPM | BODY MASS INDEX: 32.93 KG/M2 | HEIGHT: 70 IN | OXYGEN SATURATION: 94 % | SYSTOLIC BLOOD PRESSURE: 120 MMHG | WEIGHT: 230 LBS

## 2021-04-16 DIAGNOSIS — J44.9 CHRONIC OBSTRUCTIVE PULMONARY DISEASE, UNSPECIFIED COPD TYPE (HCC): ICD-10-CM

## 2021-04-16 DIAGNOSIS — I50.32 CHRONIC HEART FAILURE WITH PRESERVED EJECTION FRACTION (HCC): ICD-10-CM

## 2021-04-16 DIAGNOSIS — N18.30 STAGE 3 CHRONIC KIDNEY DISEASE, UNSPECIFIED WHETHER STAGE 3A OR 3B CKD (HCC): Chronic | ICD-10-CM

## 2021-04-16 DIAGNOSIS — G57.93 NEUROPATHY OF BOTH FEET: ICD-10-CM

## 2021-04-16 DIAGNOSIS — E03.9 ACQUIRED HYPOTHYROIDISM: Primary | Chronic | ICD-10-CM

## 2021-04-16 DIAGNOSIS — M65.331 TRIGGER MIDDLE FINGER OF RIGHT HAND: ICD-10-CM

## 2021-04-16 PROCEDURE — G8417 CALC BMI ABV UP PARAM F/U: HCPCS | Performed by: INTERNAL MEDICINE

## 2021-04-16 PROCEDURE — 1101F PT FALLS ASSESS-DOCD LE1/YR: CPT | Performed by: INTERNAL MEDICINE

## 2021-04-16 PROCEDURE — G8510 SCR DEP NEG, NO PLAN REQD: HCPCS | Performed by: INTERNAL MEDICINE

## 2021-04-16 PROCEDURE — 1090F PRES/ABSN URINE INCON ASSESS: CPT | Performed by: INTERNAL MEDICINE

## 2021-04-16 PROCEDURE — G8536 NO DOC ELDER MAL SCRN: HCPCS | Performed by: INTERNAL MEDICINE

## 2021-04-16 PROCEDURE — G8427 DOCREV CUR MEDS BY ELIG CLIN: HCPCS | Performed by: INTERNAL MEDICINE

## 2021-04-16 PROCEDURE — 99214 OFFICE O/P EST MOD 30 MIN: CPT | Performed by: INTERNAL MEDICINE

## 2021-04-16 PROCEDURE — G0463 HOSPITAL OUTPT CLINIC VISIT: HCPCS | Performed by: INTERNAL MEDICINE

## 2021-04-16 RX ORDER — VIT C/E/ZN/COPPR/LUTEIN/ZEAXAN 250MG-90MG
2 CAPSULE ORAL
COMMUNITY

## 2021-04-17 LAB
ALBUMIN SERPL-MCNC: 3.7 G/DL (ref 3.5–5)
ALBUMIN/GLOB SERPL: 1.2 {RATIO} (ref 1.1–2.2)
ALP SERPL-CCNC: 124 U/L (ref 45–117)
ALT SERPL-CCNC: 12 U/L (ref 12–78)
ANION GAP SERPL CALC-SCNC: 9 MMOL/L (ref 5–15)
AST SERPL-CCNC: 11 U/L (ref 15–37)
BILIRUB SERPL-MCNC: 0.3 MG/DL (ref 0.2–1)
BUN SERPL-MCNC: 34 MG/DL (ref 6–20)
BUN/CREAT SERPL: 24 (ref 12–20)
CALCIUM SERPL-MCNC: 9.3 MG/DL (ref 8.5–10.1)
CHLORIDE SERPL-SCNC: 110 MMOL/L (ref 97–108)
CHOLEST SERPL-MCNC: 187 MG/DL
CO2 SERPL-SCNC: 25 MMOL/L (ref 21–32)
CREAT SERPL-MCNC: 1.39 MG/DL (ref 0.55–1.02)
ERYTHROCYTE [DISTWIDTH] IN BLOOD BY AUTOMATED COUNT: 14.2 % (ref 11.5–14.5)
GLOBULIN SER CALC-MCNC: 3.2 G/DL (ref 2–4)
GLUCOSE SERPL-MCNC: 99 MG/DL (ref 65–100)
HCT VFR BLD AUTO: 42.4 % (ref 35–47)
HDLC SERPL-MCNC: 58 MG/DL
HDLC SERPL: 3.2 {RATIO} (ref 0–5)
HGB BLD-MCNC: 12.9 G/DL (ref 11.5–16)
LDLC SERPL CALC-MCNC: 107.8 MG/DL (ref 0–100)
LIPID PROFILE,FLP: ABNORMAL
MCH RBC QN AUTO: 29.9 PG (ref 26–34)
MCHC RBC AUTO-ENTMCNC: 30.4 G/DL (ref 30–36.5)
MCV RBC AUTO: 98.1 FL (ref 80–99)
NRBC # BLD: 0 K/UL (ref 0–0.01)
NRBC BLD-RTO: 0 PER 100 WBC
PLATELET # BLD AUTO: 243 K/UL (ref 150–400)
PMV BLD AUTO: 9.8 FL (ref 8.9–12.9)
POTASSIUM SERPL-SCNC: 4.5 MMOL/L (ref 3.5–5.1)
PROT SERPL-MCNC: 6.9 G/DL (ref 6.4–8.2)
RBC # BLD AUTO: 4.32 M/UL (ref 3.8–5.2)
SODIUM SERPL-SCNC: 144 MMOL/L (ref 136–145)
T4 FREE SERPL-MCNC: 1.5 NG/DL (ref 0.8–1.5)
TRIGL SERPL-MCNC: 106 MG/DL (ref ?–150)
TSH SERPL DL<=0.05 MIU/L-ACNC: 0.19 UIU/ML (ref 0.36–3.74)
VLDLC SERPL CALC-MCNC: 21.2 MG/DL
WBC # BLD AUTO: 5.8 K/UL (ref 3.6–11)

## 2021-04-17 NOTE — PROGRESS NOTES
HISTORY OF PRESENT ILLNESS    Chief Complaint   Patient presents with    Labs     Fasting.  Referral Follow Up     Neuropathy and podiatry    Skin Problem     Spots on arms. Presents for follow-up    Hypothyroidism follow-up  Reports no fatigue  denies heat/cold intolerance, bowel/skin changes or CVS symptoms, losing hair, feeling excessive energy, tremulousness, palpitations. Thyroid medication has been unchanged since last medication check and labs. Lab Results   Component Value Date/Time    TSH 0.19 (L) 04/16/2021 04:50 PM    T4, Free 1.5 04/16/2021 04:50 PM     Wt Readings from Last 3 Encounters:   04/16/21 230 lb (104.3 kg)   03/24/21 233 lb (105.7 kg)   03/11/21 233 lb (105.7 kg)     CHF  Denies sig SOB. Mild edema    CKD  Lab Results   Component Value Date/Time    Creatinine (POC) 1.2 07/06/2012 12:57 PM    Creatinine 1.39 (H) 04/16/2021 04:50 PM     Both hands have some fingers that stick when she makes a fist.  Mild. Review of Systems   All other systems reviewed and are negative, except as noted in HPI    Past Medical and Surgical History   has a past medical history of (HFpEF) heart failure with preserved ejection fraction ([address] (05/22/2012), Anemia, Atrial fibrillation ([address] (2018), Cholecystitis (2018), COPD (chronic obstructive pulmonary disease) ([address] (7/2011), GERD (gastroesophageal reflux disease), Glaucoma suspect of both eyes, Hemorrhoids, Migraine, Mixed stress and urge urinary incontinence, Neuropathy of both feet (06/2019), OA (osteoarthritis) of knee, Obesity (BMI 30.0-34.9) (9/7/2018), Osteoporosis, Other postablative hypothyroidism (1987), Psoriasis, PSVT (paroxysmal supraventricular tachycardia) ([address] (5/19/2012), Pulmonary nodules (7/2011), Sepsis, unspecified ([address] (5/14/2012), 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    has a past surgical history that includes hx tonsillectomy; hx hysterectomy (1974); hx cataract removal; echo 2d adult (7/15/11); nm cardiac spect w strs/rest mult (7/18/11); vas lower ext art pvr mult level seg pressures (7/18/11); echo 2d adult (5/19/12); hx colonoscopy (6/28/12); hx other surgical; and hx cholecystectomy (09/08/2018). reports that she quit smoking about 9 years ago. Her smoking use included cigarettes. She has a 60.00 pack-year smoking history. She has never used smokeless tobacco. She reports that she does not drink alcohol or use drugs. family history includes Arthritis-osteo in her mother; Cancer in her maternal grandmother and sister; Dementia in her mother; Heart Disease in her father and paternal grandfather; Seizures in her sister. Physical Exam   Nursing note and vitals reviewed. Blood pressure 120/74, pulse 80, temperature 97.4 °F (36.3 °C), temperature source Oral, resp. rate 12, height 5' 10\" (1.778 m), weight 230 lb (104.3 kg), SpO2 94 %. Constitutional:  No distress. Eyes: Conjunctivae are normal.   Ears:  Hearing grossly intact  Cardiovascular: Normal rate. regular rhythm, no murmurs or gallops  No edema  Pulmonary/Chest: Effort normal.   CTAB  Musculoskeletal: moves all 4 extremities   Neurological: Alert and oriented to person, place, and time. Skin: No visible rash noted. Psychiatric: Normal mood and affect. Behavior is normal.     ASSESSMENT and PLAN  Diagnoses and all orders for this visit:    1. Acquired hypothyroidism  TSH is low. Will decrease dose  -     T4, FREE; Future  -     TSH 3RD GENERATION; Future    2. Chronic heart failure with preserved ejection fraction (HCC)  Well-compensated  -     LIPID PANEL; Future    3. Stage 3 chronic kidney disease, unspecified whether stage 3a or 3b CKD (HCC)  Currently asymptomatic  -     METABOLIC PANEL, COMPREHENSIVE; Future  -     CBC W/O DIFF; Future    4.  Chronic obstructive pulmonary disease, unspecified COPD type ([facility]  This condition is controlled on current medication regimen as written in medication list.  Medications refilled. 5. Trigger middle finger of right hand  Stretching exercises demonstrated for for this condition    6. Neuropathy of both feet  Numbness, no pain. Can refer back to neurology for EMG if she would like. No tx needed. reassured    lab results and schedule of future lab studies reviewed with patient  reviewed medications and side effects in detail    Return to clinic for further evaluation if new symptoms develop      Current Outpatient Medications   Medication Sig    vit C,P-Ur-fzkyg-lutein-zeaxan (PreserVision AREDS-2) 250-90-40-1 mg cap capsule Take 2 Caps by mouth.  apixaban (Eliquis) 5 mg tablet TAKE 1 TABLET BY MOUTH TWICE A DAY    Breo Ellipta 100-25 mcg/dose inhaler TAKE 1 PUFF BY MOUTH EVERY DAY. RINSE MOUTH AFTER EACH USE    metoprolol succinate (TOPROL-XL) 50 mg XL tablet TAKE 1 TABLET BY MOUTH EVERY DAY    tolterodine (DETROL) 2 mg tablet Take 1 tablet by mouth twice daily    torsemide (DEMADEX) 10 mg tablet TAKE 1 TABLET BY MOUTH EVERY DAY    Synthroid 150 mcg tablet Take 1 pill 6 days per week. DO NOT TAKE on Sundays    acetaminophen (TYLENOL ARTHRITIS PAIN) 650 mg TbER Take 650 mg by mouth every eight (8) hours. One in the morning, one at night    cholecalciferol (VITAMIN D3) 1,000 unit cap Take 1,000 Units by mouth daily.  albuterol (PROVENTIL HFA) 90 mcg/actuation inhaler Take 2 Puffs by inhalation every four (4) hours as needed for Wheezing. (Patient taking differently: Take 2 Puffs by inhalation as needed for Wheezing.)    Vit A,C,E-Zinc-Copper (PRESERVISION AREDS) cap capsule Take 1 Cap by mouth two (2) times a day. No current facility-administered medications for this visit.

## 2021-04-21 RX ORDER — LEVOTHYROXINE SODIUM 125 UG/1
125 TABLET ORAL
Qty: 90 TAB | Refills: 1 | Status: SHIPPED | OUTPATIENT
Start: 2021-04-21 | End: 2021-08-24

## 2021-05-18 ENCOUNTER — TELEPHONE (OUTPATIENT)
Dept: CARDIOLOGY CLINIC | Age: 86
End: 2021-05-18

## 2021-05-18 RX ORDER — METOPROLOL SUCCINATE 50 MG/1
TABLET, EXTENDED RELEASE ORAL
Qty: 90 TAB | Refills: 3 | Status: SHIPPED | OUTPATIENT
Start: 2021-05-18 | End: 2022-04-05 | Stop reason: SDUPTHER

## 2021-05-18 NOTE — TELEPHONE ENCOUNTER
Refill per VO of Dr. Qasim Summers:  Last appt: Visit date not found  Future Appointments   Date Time Provider Melania Samuel   3/15/2022  1:00 PM Ifeanyi Medrano MD [facility] AMB       Requested Prescriptions     Signed Prescriptions Disp Refills    metoprolol succinate (TOPROL-XL) 50 mg XL tablet 90 Tab 3     Sig: TAKE 1 TABLET BY MOUTH EVERY DAY     Authorizing Provider: Catarino Godfrey     Ordering User: Coni Valenzuela

## 2021-05-19 RX ORDER — METOPROLOL SUCCINATE 50 MG/1
TABLET, EXTENDED RELEASE ORAL
Qty: 90 TABLET | Refills: 0 | OUTPATIENT
Start: 2021-05-19

## 2021-05-19 NOTE — TELEPHONE ENCOUNTER
Per Dr. Nancy Buck VO:  [id number]  Future Appointments   Date Time Provider Melania Lizzie   3/15/2022  1:00 PM Kristal Wagner MD [facility] AMB     Requested Prescriptions     Pending Prescriptions Disp Refills    metoprolol succinate (TOPROL-XL) 50 mg XL tablet [Pharmacy Med Name: METOPROLOL SUCC ER 50 MG TAB] 90 Tablet 0     Sig: TAKE 1 TABLET BY MOUTH EVERY DAY     Refills still available at the pharmacy

## 2021-07-18 DIAGNOSIS — I50.32 CHRONIC DIASTOLIC HEART FAILURE (HCC): ICD-10-CM

## 2021-07-18 DIAGNOSIS — N39.46 MIXED STRESS AND URGE URINARY INCONTINENCE: ICD-10-CM

## 2021-07-18 RX ORDER — TOLTERODINE TARTRATE 2 MG/1
TABLET, EXTENDED RELEASE ORAL
Qty: 180 TABLET | Refills: 1 | Status: SHIPPED | OUTPATIENT
Start: 2021-07-18 | End: 2022-02-17

## 2021-07-19 RX ORDER — TORSEMIDE 10 MG/1
TABLET ORAL
Qty: 90 TABLET | Refills: 2 | Status: SHIPPED | OUTPATIENT
Start: 2021-07-19 | End: 2022-04-05 | Stop reason: SDUPTHER

## 2021-07-19 NOTE — TELEPHONE ENCOUNTER
Refill per VO of Dr. Nayeli Warren  Last appt: 3/11/2021  Future Appointments   Date Time Provider Melania Samuel   3/15/2022  1:00 PM Aleksandr Haines MD [facility] AMB       Requested Prescriptions     Pending Prescriptions Disp Refills    torsemide (DEMADEX) 10 mg tablet [Pharmacy Med Name: TORSEMIDE 10 MG TABLET] 90 Tablet 2     Sig: TAKE 1 TABLET BY MOUTH EVERY DAY

## 2021-08-24 RX ORDER — LEVOTHYROXINE SODIUM 125 UG/1
TABLET ORAL
Qty: 90 TABLET | Refills: 1 | Status: SHIPPED | OUTPATIENT
Start: 2021-08-24 | End: 2022-02-01

## 2022-01-12 ENCOUNTER — TELEPHONE (OUTPATIENT)
Dept: INTERNAL MEDICINE CLINIC | Age: 87
End: 2022-01-12

## 2022-01-12 NOTE — TELEPHONE ENCOUNTER
PSR reached out to patient to reschedule upcoming CPE due to provider being out of the office. Patient did not answer, left detailed VM for call back and sent my chart message.      She was scheduled with her  [both for CPE's] if patient returns call please schedule both for the same day.

## 2022-01-13 NOTE — TELEPHONE ENCOUNTER
Refill per VO of Dr. Shannan Garcia  Last appt: 2/27/2020  Future Appointments   Date Time Provider Melania Lizzie   1/17/2022 10:00 AM Cherie Hutchison MD [facility] AM[facility]   3/15/2022  1:00 PM Ashleigh Swan MD [facility] BS AMB       Requested Prescriptions     Pending Prescriptions Disp Refills    apixaban (Eliquis) 5 mg tablet [Pharmacy Med Name: ELIQUIS 5 MG TABLET] 180 Tablet 3     Sig: TAKE 1 TABLET BY MOUTH TWICE A DAY

## 2022-01-31 ENCOUNTER — OFFICE VISIT (OUTPATIENT)
Dept: INTERNAL MEDICINE CLINIC | Age: 87
End: 2022-01-31
Payer: MEDICARE

## 2022-01-31 VITALS
WEIGHT: 224 LBS | HEART RATE: 92 BPM | HEIGHT: 70 IN | TEMPERATURE: 97.9 F | RESPIRATION RATE: 14 BRPM | OXYGEN SATURATION: 95 % | BODY MASS INDEX: 32.07 KG/M2 | SYSTOLIC BLOOD PRESSURE: 124 MMHG | DIASTOLIC BLOOD PRESSURE: 75 MMHG

## 2022-01-31 DIAGNOSIS — I50.32 CHRONIC HEART FAILURE WITH PRESERVED EJECTION FRACTION (HCC): ICD-10-CM

## 2022-01-31 DIAGNOSIS — N18.30 STAGE 3 CHRONIC KIDNEY DISEASE, UNSPECIFIED WHETHER STAGE 3A OR 3B CKD (HCC): ICD-10-CM

## 2022-01-31 DIAGNOSIS — Z28.21 COVID-19 VACCINATION DECLINED: ICD-10-CM

## 2022-01-31 DIAGNOSIS — Z23 NEEDS FLU SHOT: ICD-10-CM

## 2022-01-31 DIAGNOSIS — G57.93 NEUROPATHY OF BOTH FEET: ICD-10-CM

## 2022-01-31 DIAGNOSIS — E53.8 B12 DEFICIENCY: ICD-10-CM

## 2022-01-31 DIAGNOSIS — I48.0 PAF (PAROXYSMAL ATRIAL FIBRILLATION) (HCC): ICD-10-CM

## 2022-01-31 DIAGNOSIS — Z00.00 MEDICARE ANNUAL WELLNESS VISIT, SUBSEQUENT: Primary | ICD-10-CM

## 2022-01-31 DIAGNOSIS — E03.9 ACQUIRED HYPOTHYROIDISM: ICD-10-CM

## 2022-01-31 DIAGNOSIS — J44.9 CHRONIC OBSTRUCTIVE PULMONARY DISEASE, UNSPECIFIED COPD TYPE (HCC): ICD-10-CM

## 2022-01-31 PROCEDURE — G0439 PPPS, SUBSEQ VISIT: HCPCS | Performed by: INTERNAL MEDICINE

## 2022-01-31 PROCEDURE — 99214 OFFICE O/P EST MOD 30 MIN: CPT | Performed by: INTERNAL MEDICINE

## 2022-01-31 PROCEDURE — 1101F PT FALLS ASSESS-DOCD LE1/YR: CPT | Performed by: INTERNAL MEDICINE

## 2022-01-31 PROCEDURE — G8536 NO DOC ELDER MAL SCRN: HCPCS | Performed by: INTERNAL MEDICINE

## 2022-01-31 PROCEDURE — 1090F PRES/ABSN URINE INCON ASSESS: CPT | Performed by: INTERNAL MEDICINE

## 2022-01-31 PROCEDURE — G8427 DOCREV CUR MEDS BY ELIG CLIN: HCPCS | Performed by: INTERNAL MEDICINE

## 2022-01-31 PROCEDURE — 90694 VACC AIIV4 NO PRSRV 0.5ML IM: CPT | Performed by: INTERNAL MEDICINE

## 2022-01-31 PROCEDURE — G8510 SCR DEP NEG, NO PLAN REQD: HCPCS | Performed by: INTERNAL MEDICINE

## 2022-01-31 PROCEDURE — G8417 CALC BMI ABV UP PARAM F/U: HCPCS | Performed by: INTERNAL MEDICINE

## 2022-01-31 PROCEDURE — G0463 HOSPITAL OUTPT CLINIC VISIT: HCPCS | Performed by: INTERNAL MEDICINE

## 2022-01-31 RX ORDER — ZOSTER VACCINE RECOMBINANT, ADJUVANTED 50 MCG/0.5
0.5 KIT INTRAMUSCULAR ONCE
Qty: 0.5 ML | Refills: 1
Start: 2022-01-31 | End: 2022-01-31

## 2022-01-31 NOTE — PROGRESS NOTES
This is a Subsequent Medicare Annual Wellness Visit providing Personalized Prevention Plan Services (PPPS) (Performed 12 months after initial AWV and PPPS )    I have reviewed the patient's medical history in detail and updated the computerized patient record. She is with her aid/friend Marisel Shore. Walking with a rolling walker. Has been relatively socially isolating because of COVID-19. Seeing [facility] neuropathy center since December. Microinjections being done b12, L-ceritine, ascorbic acid, alpha lipoic, sodium bicarb. She was told to take A39 195 mcg and folic acid pill. Reports numbness and   Lab Results   Component Value Date/Time    Vitamin B12 323 05/08/2019 04:44 PM      OAB/intcontinence. Taking detrol 2 mg bid. Atrial fibrillation. She is followed by Dr. Raisa Parks. Diastolic congestive heart failure. Denies any increased edema or shortness of breath. Recommended to maintain Eliquis and beta-blocker long-term. High risk of recurrence. Takes torsemide. Has declined statin therapy. Hypothyroidism follow-up  Reports increased tremor and lack of satiety. Taking synthroid 125 mcg daily but ran out and is taking 150 mcg for 5 days, nothing today. Reports no fatigue. denies heat/cold intolerance, bowel/skin changes or CVS symptoms, losing hair, feeling excessive energy, tremulousness, palpitations. Thyroid medication has been unchanged since last medication check and labs. Lab Results   Component Value Date/Time    TSH 0.19 (L) 04/16/2021 04:50 PM    T4, Free 1.5 04/16/2021 04:50 PM     Lost 6 lb in 9 months.   Wt Readings from Last 3 Encounters:   01/31/22 224 lb (101.6 kg)   04/16/21 230 lb (104.3 kg)   03/24/21 233 lb (105.7 kg)         History     Past Medical History:   Diagnosis Date    (HFpEF) heart failure with preserved ejection fraction ([facility] 05/22/2012    Dr Kelly Castañeda.  first presented 2012 in the setting of sepsis    Anemia     hx b12 def age 22-31s   Rubin Atrial fibrillation [facility]) 2018    Dr Shonda Talamantes Cholecystitis 2018    COPD (chronic obstructive pulmonary disease) ([facility] [facility] 7/2011    FEV1 1 L 7/2011. Dr. Dot Stone GERD (gastroesophageal reflux disease)     Glaucoma suspect of both eyes     Dr Savannah Natarajan    Hemorrhoids     Migraine     Mixed stress and urge urinary incontinence     Neuropathy of both feet 06/2019    consult Dr. Rita Spencer.  OA (osteoarthritis) of knee     R, Dr. Will Workman. Euflexa injections x3. has walker    Obesity (BMI 30.0-34.9) 9/7/2018    Osteoporosis     declines medication tx    Other postablative hypothyroidism 1987    ROSAS 1987  saw Dr. Karin Robin     left ear    PSVT (paroxysmal supraventricular tachycardia) ([facility] [address] 5/19/2012    Dr. Shonda Talamantes Pulmonary nodules 7/2011    RLL x2.  stable 2/2012  Dr. 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 Dr. Aguilar Schooling    menorrhagia    HX OTHER SURGICAL      Echo 7/31/14 - LVEF 60-65%, grade 1 DD    HX TONSILLECTOMY      NM CARDIAC SPECT W STRS/REST MULT  7/18/11    small sized fixed defect in inferolateral wall may represent scar or abd attenuation; EF 80%    VAS LOWER EXT ART PVR MULT LEVEL SEG PRESSURES  7/18/11    normal       Current Outpatient Medications   Medication Sig    apixaban (Eliquis) 5 mg tablet TAKE 1 TABLET BY MOUTH TWICE A DAY    Synthroid 125 mcg tablet TAKE 1 TAB BY MOUTH DAILY (BEFORE BREAKFAST). INDICATIONS:A CONDITION WITH LOW THYROID HORMONE LEVELS    torsemide (DEMADEX) 10 mg tablet TAKE 1 TABLET BY MOUTH EVERY DAY    tolterodine (DETROL) 2 mg tablet TAKE 1 TABLET BY MOUTH TWICE A DAY    metoprolol succinate (TOPROL-XL) 50 mg XL tablet TAKE 1 TABLET BY MOUTH EVERY DAY    vit C,K-Cd-acfru-lutein-zeaxan (PreserVision AREDS-2) 250-90-40-1 mg cap capsule Take 2 Caps by mouth.  Breo Ellipta 100-25 mcg/dose inhaler TAKE 1 PUFF BY MOUTH EVERY DAY. RINSE MOUTH AFTER EACH USE    acetaminophen (TYLENOL ARTHRITIS PAIN) 650 mg TbER Take 650 mg by mouth every eight (8) hours. One in the morning, one at night    cholecalciferol (VITAMIN D3) 1,000 unit cap Take 1,000 Units by mouth daily.  albuterol (PROVENTIL HFA) 90 mcg/actuation inhaler Take 2 Puffs by inhalation every four (4) hours as needed for Wheezing. (Patient taking differently: Take 2 Puffs by inhalation as needed for Wheezing.)     No current facility-administered medications for this visit. Allergies   Allergen Reactions    Ciprofloxacin (Bulk) Rash    Diltiazem Rash    Piperacillin Other (comments)     Appears to have tolerated 5/13/12-5/23/12       Family History   Problem Relation Age of Onset    Cancer Maternal Grandmother         breast    Cancer Sister         lymphopma    Heart Disease Father     Heart Disease Paternal Grandfather     Seizures Sister     OSTEOARTHRITIS Mother     Dementia Mother     Malignant Hyperthermia Neg Hx     Pseudocholinesterase Deficiency Neg Hx     Delayed Awakening Neg Hx     Post-op Nausea/Vomiting Neg Hx     Emergence Delirium Neg Hx     Post-op Cognitive Dysfunction Neg Hx     Other Neg Hx         reports that she quit smoking about 10 years ago. Her smoking use included cigarettes. She has a 60.00 pack-year smoking history. She has never used smokeless tobacco.   reports no history of alcohol use. Depression Risk Factor Screening:       Alcohol Risk Factor Screening: On any occasion during the past 3 months, have you had more than 3 drinks containing alcohol?   No    Do you average more than 14 drinks per week? No      Functional Ability and Level of Safety:     Hearing Loss   mild    Activities of Daily Living   Self-care. Requires assistance with: no ADLs    Fall Risk     Fall Risk Assessment, last 12 mths 1/31/2022   Able to walk? Yes   Fall in past 12 months? 0   Do you feel unsteady? 0   Are you worried about falling 0   Fall with injury? -         Abuse Screen   Patient is not abused    Review of Systems   A comprehensive review of systems was negative except for that written in the HPI. Physical Examination     Evaluation of Cognitive Function:  Mood/affect:  neutral, happy  Appearance: age appropriate  Family member/caregiver input: none    Blood pressure 124/75, pulse 92, temperature 97.9 °F (36.6 °C), temperature source Oral, resp. rate 14, height 5' 10\" (1.778 m), weight 224 lb (101.6 kg), SpO2 95 %. General appearance: alert, cooperative, no distress, appears stated age  Neck: supple, symmetrical, trachea midline, no adenopathy, thyroid: not enlarged, symmetric, no tenderness/mass/nodules, no carotid bruit and no JVD  Lungs: clear to auscultation bilaterally  Antalgic gait. Using rolling walker. Some paraspinal and muscle tenderness of right lower spine. No overt weakness. Heart: regular rate and rhythm, S1, S2 normal, no murmur, click, rub or gallop  Extremities: extremities normal, atraumatic, no cyanosis or edema  Scaly circular rash of left lower extremity of lateral ankle and somewhat of the inner leg. Patient Care Team:  Solo Goldberg MD as PCP - General (Internal Medicine)  Solo Goldberg MD as PCP - REHABILITATION [facility] Empaneled Provider  Marie Elliott MD (Cardiology)      Advice/Referrals/Counseling   Education and counseling provided. See below for specific orders    Assessment/Plan   Diagnoses and all orders for this visit:    Diagnoses and all orders for this visit:    1.  Medicare annual wellness visit, subsequent  -     Shingrix, PF, 50 mcg/0.5 mL susr injection; 0.5 mL by IntraMUSCular route once for 1 dose. Receive 2nd dose in 2-6 months. For Shingles (Zoster) prevention    2. Needs flu shot  -     FLU (FLUAD QUAD INFLUENZA VACCINE,QUAD,ADJUVANTED)    3. COVID-19 vaccination declined  Counseled on importance of COVID-19 vaccination. She is high risk given that she is almost 80years old at home medical comorbidities. There is no risk factor for osteolytic benefit of this vaccination program.    4. Acquired hypothyroidism  This condition is controlled on current medication regimen as written in medication list.  Medications refilled. -     TSH 3RD GENERATION; Future  -     T4, FREE; Future    5. Neuropathy of both feet  -     VITAMIN B12 & FOLATE; Future    6. Chronic obstructive pulmonary disease, unspecified COPD type ([facility]  This condition appears to be stable and is being evaluated and managed by her specialist.   No acute findings today warrant change in management plan. 7. Chronic heart failure with preserved ejection fraction ([facility]  This condition appears to be stable and is being evaluated and managed by her specialist .   No acute findings today warrant change in management plan. -     METABOLIC PANEL, COMPREHENSIVE; Future  -     LIPID PANEL; Future    8. PAF (paroxysmal atrial fibrillation) (HCC)  Currently asymptomatic  This condition appears to be stable and is being evaluated and managed by her specialist Dr. Rah Castañeda. No acute findings today warrant change in management plan. 9. Stage 3 chronic kidney disease, unspecified whether stage 3a or 3b CKD ([facility] [address]  This condition is controlled on current medication regimen as written in medication list.  Medications refilled. 10. B12 deficiency  -     VITAMIN B12 & FOLATE; Future      Potential medication side effects were discussed with the patient; let me know if any occur.   Return for yearly Annual Wellness Visits

## 2022-02-01 ENCOUNTER — TELEPHONE (OUTPATIENT)
Dept: INTERNAL MEDICINE CLINIC | Age: 87
End: 2022-02-01

## 2022-02-01 LAB
ALBUMIN SERPL-MCNC: 3.7 G/DL (ref 3.5–5)
ALBUMIN/GLOB SERPL: 1.1 {RATIO} (ref 1.1–2.2)
ALP SERPL-CCNC: 82 U/L (ref 45–117)
ALT SERPL-CCNC: 16 U/L (ref 12–78)
ANION GAP SERPL CALC-SCNC: 3 MMOL/L (ref 5–15)
AST SERPL-CCNC: 12 U/L (ref 15–37)
BILIRUB SERPL-MCNC: 0.4 MG/DL (ref 0.2–1)
BUN SERPL-MCNC: 25 MG/DL (ref 6–20)
BUN/CREAT SERPL: 17 (ref 12–20)
CALCIUM SERPL-MCNC: 10 MG/DL (ref 8.5–10.1)
CHLORIDE SERPL-SCNC: 108 MMOL/L (ref 97–108)
CHOLEST SERPL-MCNC: 216 MG/DL
CO2 SERPL-SCNC: 30 MMOL/L (ref 21–32)
COMMENT, HOLDF: NORMAL
CREAT SERPL-MCNC: 1.44 MG/DL (ref 0.55–1.02)
FOLATE SERPL-MCNC: 12.8 NG/ML (ref 5–21)
GLOBULIN SER CALC-MCNC: 3.4 G/DL (ref 2–4)
GLUCOSE SERPL-MCNC: 97 MG/DL (ref 65–100)
HDLC SERPL-MCNC: 62 MG/DL
HDLC SERPL: 3.5 {RATIO} (ref 0–5)
LDLC SERPL CALC-MCNC: 119.6 MG/DL (ref 0–100)
POTASSIUM SERPL-SCNC: 4.4 MMOL/L (ref 3.5–5.1)
PROT SERPL-MCNC: 7.1 G/DL (ref 6.4–8.2)
SAMPLES BEING HELD,HOLD: NORMAL
SODIUM SERPL-SCNC: 141 MMOL/L (ref 136–145)
T4 FREE SERPL-MCNC: 1.3 NG/DL (ref 0.8–1.5)
TRIGL SERPL-MCNC: 172 MG/DL (ref ?–150)
TSH SERPL DL<=0.05 MIU/L-ACNC: 0.16 UIU/ML (ref 0.36–3.74)
VIT B12 SERPL-MCNC: 382 PG/ML (ref 193–986)
VLDLC SERPL CALC-MCNC: 34.4 MG/DL

## 2022-02-01 RX ORDER — LEVOTHYROXINE SODIUM 125 UG/1
125 TABLET ORAL
Qty: 90 TABLET | Refills: 1 | Status: SHIPPED | OUTPATIENT
Start: 2022-02-01 | End: 2022-02-01

## 2022-02-01 RX ORDER — LEVOTHYROXINE SODIUM 125 UG/1
TABLET ORAL
Qty: 90 TABLET | Refills: 1
Start: 2022-02-01 | End: 2022-10-20

## 2022-02-01 NOTE — TELEPHONE ENCOUNTER
Spoke with patient and relayed Dr. Stephane Daily comment, recommendations and medication script and instructions. Patient states understanding. Patient requests a copy of her visit yesterday be mailed to her. Grateful for the call.

## 2022-02-17 DIAGNOSIS — N39.46 MIXED STRESS AND URGE URINARY INCONTINENCE: ICD-10-CM

## 2022-02-17 RX ORDER — TOLTERODINE TARTRATE 2 MG/1
TABLET, EXTENDED RELEASE ORAL
Qty: 180 TABLET | Refills: 1 | Status: SHIPPED | OUTPATIENT
Start: 2022-02-17 | End: 2022-08-18 | Stop reason: SDUPTHER

## 2022-03-02 ENCOUNTER — TELEPHONE (OUTPATIENT)
Dept: CARDIOLOGY CLINIC | Age: 87
End: 2022-03-02

## 2022-03-02 NOTE — TELEPHONE ENCOUNTER
Lvm requesting a call back to reschedule upcoming appointment on 3/15. Please reschedule to next available. Thanks.

## 2022-03-18 PROBLEM — G57.93 NEUROPATHY OF BOTH FEET: Status: ACTIVE | Noted: 2018-10-01

## 2022-03-19 PROBLEM — E66.9 OBESITY (BMI 30.0-34.9): Status: ACTIVE | Noted: 2018-09-07

## 2022-03-19 PROBLEM — E66.811 OBESITY (BMI 30.0-34.9): Status: ACTIVE | Noted: 2018-09-07

## 2022-03-19 PROBLEM — N18.30 CKD (CHRONIC KIDNEY DISEASE), STAGE III (HCC): Status: ACTIVE | Noted: 2017-11-16

## 2022-03-19 PROBLEM — I48.0 PAF (PAROXYSMAL ATRIAL FIBRILLATION) (HCC): Status: ACTIVE | Noted: 2019-01-10

## 2022-04-05 ENCOUNTER — OFFICE VISIT (OUTPATIENT)
Dept: CARDIOLOGY CLINIC | Age: 87
End: 2022-04-05
Payer: MEDICARE

## 2022-04-05 VITALS
HEART RATE: 64 BPM | SYSTOLIC BLOOD PRESSURE: 128 MMHG | BODY MASS INDEX: 31.92 KG/M2 | HEIGHT: 70 IN | WEIGHT: 223 LBS | OXYGEN SATURATION: 97 % | DIASTOLIC BLOOD PRESSURE: 88 MMHG

## 2022-04-05 DIAGNOSIS — I50.32 CHRONIC DIASTOLIC HEART FAILURE (HCC): Primary | ICD-10-CM

## 2022-04-05 DIAGNOSIS — I48.0 PAF (PAROXYSMAL ATRIAL FIBRILLATION) (HCC): ICD-10-CM

## 2022-04-05 PROCEDURE — 93010 ELECTROCARDIOGRAM REPORT: CPT | Performed by: SPECIALIST

## 2022-04-05 PROCEDURE — G8432 DEP SCR NOT DOC, RNG: HCPCS | Performed by: SPECIALIST

## 2022-04-05 PROCEDURE — 93005 ELECTROCARDIOGRAM TRACING: CPT | Performed by: SPECIALIST

## 2022-04-05 PROCEDURE — 1101F PT FALLS ASSESS-DOCD LE1/YR: CPT | Performed by: SPECIALIST

## 2022-04-05 PROCEDURE — 99214 OFFICE O/P EST MOD 30 MIN: CPT | Performed by: SPECIALIST

## 2022-04-05 PROCEDURE — G8417 CALC BMI ABV UP PARAM F/U: HCPCS | Performed by: SPECIALIST

## 2022-04-05 PROCEDURE — G8427 DOCREV CUR MEDS BY ELIG CLIN: HCPCS | Performed by: SPECIALIST

## 2022-04-05 PROCEDURE — 1090F PRES/ABSN URINE INCON ASSESS: CPT | Performed by: SPECIALIST

## 2022-04-05 PROCEDURE — G8536 NO DOC ELDER MAL SCRN: HCPCS | Performed by: SPECIALIST

## 2022-04-05 PROCEDURE — G0463 HOSPITAL OUTPT CLINIC VISIT: HCPCS | Performed by: SPECIALIST

## 2022-04-05 RX ORDER — TORSEMIDE 10 MG/1
10 TABLET ORAL DAILY
Qty: 90 TABLET | Refills: 3 | Status: SHIPPED | OUTPATIENT
Start: 2022-04-05

## 2022-04-05 RX ORDER — METOPROLOL SUCCINATE 50 MG/1
TABLET, EXTENDED RELEASE ORAL
Qty: 90 TABLET | Refills: 3 | Status: SHIPPED | OUTPATIENT
Start: 2022-04-05

## 2022-04-05 NOTE — PROGRESS NOTES
Chief Complaint   Patient presents with    Other     PAF, HFpEF, HF     Follow-up     Annual      Visit Vitals  /88 (BP 1 Location: Right upper arm, BP Patient Position: Sitting)   Pulse 64   Ht 5' 10\" (1.778 m)   Wt 223 lb (101.2 kg)   SpO2 97%   BMI 32.00 kg/m²     Chest pain denied   SOB YES related to exertion and COPD. Palpitations denied   Swelling in hands/feet Yes feet.   Dizziness denied   Recent hospital stays denied   Refills denied

## 2022-04-05 NOTE — PROGRESS NOTES
Godfrey Aguilar MD. [facility]              Patient: Indira Hsieh  : 1932      Today's Date: 2022            HISTORY OF PRESENT ILLNESS:     History of Present Illness:  She is doing fair. She has a neuropathy which bothers her and getting treatment (vitamin shots) - it seems to be helping some. Her breathing seems to be OK - chronic GARCIA from COPD (class 2-3 symptoms). No CP. No heart racing. PAST MEDICAL HISTORY:     Past Medical History:   Diagnosis Date    (HFpEF) heart failure with preserved ejection fraction ([address] 2012    Dr Tonya Trejo.  first presented  in the setting of sepsis    Anemia     hx b12 def age 22-31s    Atrial fibrillation ([address]     Dr Maryam Granados Cholecystitis 2018    COPD (chronic obstructive pulmonary disease) ([facility] [address] 2011    FEV1 1 L 2011. Dr. Reynold Adames COVID-19 vaccination declined     GERD (gastroesophageal reflux disease)     Glaucoma suspect of both eyes     Dr Eda Quezada    Hemorrhoids     Migraine     Mixed stress and urge urinary incontinence     Neuropathy of both feet 2019    consult Dr. Taylor Carpenter.  OA (osteoarthritis) of knee     R, Dr. Concepcion Garcia.   Euflexa injections x3. has walker    Obesity (BMI 30.0-34.9) 2018    Osteoporosis     declines medication tx    Other postablative hypothyroidism     ROSAS   saw Dr. Alethea Bethea     left ear    PSVT (paroxysmal supraventricular tachycardia) ([facility] [facility] 2012    Dr. Maryam Granados Pulmonary nodules 2011    RLL x2.  stable 2012  Dr. 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 Eda Quezada, bilaterally  HX CHOLECYSTECTOMY  09/08/2018    HX COLONOSCOPY  6/28/12    2 polyps. Dr. Konnie Oppenheim    menorrhagia    HX OTHER SURGICAL      Echo 7/31/14 - LVEF 60-65%, grade 1 DD    HX TONSILLECTOMY      NM CARDIAC SPECT W STRS/REST MULT  7/18/11    small sized fixed defect in inferolateral wall may represent scar or abd attenuation; EF 80%    VAS LOWER EXT ART PVR MULT LEVEL SEG PRESSURES  7/18/11    normal           MEDICATIONS:     Current Outpatient Medications   Medication Sig Dispense Refill    tolterodine (DETROL) 2 mg tablet TAKE 1 TABLET BY MOUTH TWICE A  Tablet 1    Synthroid 125 mcg tablet Take 1 pill daily 6 days per week and 1/2 pill on Sundays. Decreased 2/1/22 90 Tablet 1    apixaban (Eliquis) 5 mg tablet TAKE 1 TABLET BY MOUTH TWICE A  Tablet 3    torsemide (DEMADEX) 10 mg tablet TAKE 1 TABLET BY MOUTH EVERY DAY 90 Tablet 2    metoprolol succinate (TOPROL-XL) 50 mg XL tablet TAKE 1 TABLET BY MOUTH EVERY DAY 90 Tab 3    vit C,I-Ns-jonsa-lutein-zeaxan (PreserVision AREDS-2) 250-90-40-1 mg cap capsule Take 2 Caps by mouth.  Breo Ellipta 100-25 mcg/dose inhaler TAKE 1 PUFF BY MOUTH EVERY DAY. RINSE MOUTH AFTER EACH USE      acetaminophen (TYLENOL ARTHRITIS PAIN) 650 mg TbER Take 650 mg by mouth every eight (8) hours. One in the morning, one at night      cholecalciferol (VITAMIN D3) 1,000 unit cap Take 1,000 Units by mouth daily.  albuterol (PROVENTIL HFA) 90 mcg/actuation inhaler Take 2 Puffs by inhalation every four (4) hours as needed for Wheezing.  (Patient taking differently: Take 2 Puffs by inhalation as needed for Wheezing.) 1 Inhaler 5       Allergies   Allergen Reactions    Ciprofloxacin (Bulk) Rash    Diltiazem Rash    Piperacillin Other (comments)     Appears to have tolerated 5/13/12-5/23/12             SOCIAL HISTORY:     Social History     Tobacco Use    Smoking status: Former Smoker     Packs/day: 1.00     Years: 60.00     Pack years: 60. 00     Types: Cigarettes     Quit date: 8/27/2011     Years since quitting: 10.6    Smokeless tobacco: Never Used   Vaping Use    Vaping Use: Never used   Substance Use Topics    Alcohol use: No     Alcohol/week: 0.0 standard drinks    Drug use: No         FAMILY HISTORY:     Family History   Problem Relation Age of Onset    Cancer Maternal Grandmother         breast    Cancer Sister         lymphopma    Heart Disease Father     Heart Disease Paternal Grandfather     Seizures Sister     OSTEOARTHRITIS Mother     Dementia Mother     Malignant Hyperthermia Neg Hx     Pseudocholinesterase Deficiency Neg Hx     Delayed Awakening Neg Hx     Post-op Nausea/Vomiting Neg Hx     Emergence Delirium Neg Hx     Post-op Cognitive Dysfunction Neg Hx     Other Neg Hx           REVIEW OF SYSTEMS:       Review of Systems:    Constitutional: Negative for fever, chills    HEENT: Negative for vision changes.    Respiratory: + occ cough;  GARCIA  Cardiovascular: Negative for orthopnea, syncope, and PND.    Gastrointestinal: Negative for abdominal pain, diarrhea, or melena    Genitourinary: Negative for dysuria  + urinary incontinence at times   Musculoskeletal:  + joint pain    Skin: Negative for rash    Heme: No problems bleeding.    Neurological: + leg neuropathy               PHYSICAL EXAM:       Physical Exam:   Visit Vitals  /88 (BP 1 Location: Right upper arm, BP Patient Position: Sitting)   Pulse 64   Ht 5' 10\" (1.778 m)   Wt 223 lb (101.2 kg)   LMP  (LMP Unknown)   SpO2 97%   BMI 32.00 kg/m²       Patient appears generally well, mood and affect are appropriate and pleasant.    HEENT: Normocephalic, atraumatic.    Neck Exam: Supple, No JVD sitting up   Lung Exam: Clear to auscultation, even breath sounds.    Cardiac Exam: Regular rate and rhythm with no murmur  Abdomen: Soft, non-tender, normal bowel sounds. + obese  Extremities: + mild bilat LE edema.   MAW  Neuro - non focal   Psych - appropriate affect           LABS / OTHER STUDIES:       Lab Results   Component Value Date/Time    Sodium 141 01/31/2022 02:36 PM    Potassium 4.4 01/31/2022 02:36 PM    Chloride 108 01/31/2022 02:36 PM    CO2 30 01/31/2022 02:36 PM    Anion gap 3 (L) 01/31/2022 02:36 PM    Glucose 97 01/31/2022 02:36 PM    BUN 25 (H) 01/31/2022 02:36 PM    Creatinine 1.44 (H) 01/31/2022 02:36 PM    BUN/Creatinine ratio 17 01/31/2022 02:36 PM    GFR est AA 42 (L) 01/31/2022 02:36 PM    GFR est non-AA 34 (L) 01/31/2022 02:36 PM    Calcium 10.0 01/31/2022 02:36 PM    Bilirubin, total 0.4 01/31/2022 02:36 PM    Alk. phosphatase 82 01/31/2022 02:36 PM    Protein, total 7.1 01/31/2022 02:36 PM    Albumin 3.7 01/31/2022 02:36 PM    Globulin 3.4 01/31/2022 02:36 PM    A-G Ratio 1.1 01/31/2022 02:36 PM    ALT (SGPT) 16 01/31/2022 02:36 PM    AST (SGOT) 12 (L) 01/31/2022 02:36 PM     Lab Results   Component Value Date/Time    WBC 5.8 04/16/2021 04:50 PM    Hemoglobin (POC) 11.2 (L) 05/11/2012 09:58 PM    HGB 12.9 04/16/2021 04:50 PM    Hematocrit (POC) 33 (L) 05/11/2012 09:58 PM    HCT 42.4 04/16/2021 04:50 PM    PLATELET 935 67/04/7276 04:50 PM    MCV 98.1 04/16/2021 04:50 PM       Lab Results   Component Value Date/Time    Cholesterol, total 216 (H) 01/31/2022 02:36 PM    HDL Cholesterol 62 01/31/2022 02:36 PM    LDL, calculated 119.6 (H) 01/31/2022 02:36 PM    VLDL, calculated 34.4 01/31/2022 02:36 PM    Triglyceride 172 (H) 01/31/2022 02:36 PM    CHOL/HDL Ratio 3.5 01/31/2022 02:36 PM       Lab Results   Component Value Date/Time    TSH 0.16 (L) 01/31/2022 02:36 PM                     CARDIAC DIAGNOSTICS:       Cardiac Evaluation Includes:  Echo 2011 - EF 65%, mild LVH, mild PA HTN  Echo 5/2012 - EF 55 % to 60 %. There were no regional wall motion abnormalities.   Echo 7/31/14 - EF 60 % to 65 %.grade 1 diastolic dysfunction, mild TR,  Echo 9/10/18 - EF 60%, suboptimal visulization of wall motion, nml RV, mild pulm HTN, IVC dilated.     Echo 3/24/21 - Definity. LVEF 60-65%, mild LVH. Grade 1 diastology       EKG 9/7/18 - NSR, RBBB  EKG 9/8/18 - Afib, , RBBB  EKG 9/9/18 - Atrial flutter, RBBB, HR 96  ECG 10/3/2018 - NSR, RBBB, PVC  EKG 2/27/20 - NSR, RBBB  EKG 3/11/21 - NSR, RBBB  EKG 4/5/22 - NSR, RBBB             ASSESSMENT AND PLAN:       Assessment and Plan:    1) Paroxysmal Afib / Atrial flutter  - newly diagnosed 9/7/18 when admitted with acute anam.  TSH was also low then.    - Is back in NSR on 10/3/18   - Given her risk of recurrent afib and high [id number] score of 4, I thought long term [address] would be best --> I reviewed option of checking an event monitor to see if we continue [address] - she is comfortable continuing [address]   - continue BB and Eliquis   - Doing OK overall and is currently in sinus      2) Chronic LE Edema  (venous insufficiency) +/- HFpEF   - Has chronic complaints  of LE edema   - Problems due to Venous Insufficiency most likely  - Echo 7/31/14 - LVEF 60-65%, grade 1 DD  - Echo 3/24/21 - Definity. LVEF 60-65%, mild LVH. Grade 1 diastology   - I stopped verapamil in past and instead tried a beta-blocker (Toprol XL 50 mg daily), with mild improvement in symptoms    - Continue with leg elevation. Encourage support hose.     - Continue diuretic (torsemide) but would limit its use          3) Chronic GARCIA    - due to COPD.    - Prior echo and stress test without major abnormalities.     4) Dyslipidemia  - she declined statin in past    - At age > 71 without CVD, benefit of statin for primary prevention is not clear     5) Neuropathy - Seeing Dr. Mauricio Gruber     6) She refused COVID vaccination       7) See me back in one year. Sister has lymphoma.  Has a daughter in [place]; step-daughter close by. 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          David Qureshi MD, [address]  [address], [address]      [id number] [facility].  [address], [address] [place] [address]  Ph: [phone]                               X [phone]

## 2022-06-06 ENCOUNTER — TELEPHONE (OUTPATIENT)
Dept: INTERNAL MEDICINE CLINIC | Age: 87
End: 2022-06-06

## 2022-06-06 RX ORDER — NIRMATRELVIR AND RITONAVIR 300-100 MG
3 KIT ORAL EVERY 12 HOURS
Qty: 1 BOX | Refills: 0 | Status: SHIPPED | OUTPATIENT
Start: 2022-06-06 | End: 2022-06-07

## 2022-06-06 NOTE — TELEPHONE ENCOUNTER
Paxlovid sent  Take 1/2 pill of Eliquis twice daily for 8 days and do not take tolterodine for 8 days

## 2022-06-06 NOTE — TELEPHONE ENCOUNTER
Pt was advised , she wrote down dr kaur instructions. I reminded pt that these are also on her prescription bottle. Pt was very thankful.

## 2022-06-06 NOTE — TELEPHONE ENCOUNTER
----- Message from Oma Kemp sent at 6/6/2022  3:06 PM EDT -----  Subject: Message to Provider    QUESTIONS  Information for Provider? Pt tested positive for covid 19 today and would   like to know if there is any meds the  could recommend for her. Pt   states she only symptom of a cough. Temp Sat was 99  ---------------------------------------------------------------------------  --------------  CALL BACK INFO  What is the best way for the office to contact you? OK to leave message on   voicemail  Preferred Call Back Phone Number? [phone]  ---------------------------------------------------------------------------  --------------  SCRIPT ANSWERS  Relationship to Patient?  Self

## 2022-06-07 RX ORDER — NIRMATRELVIR AND RITONAVIR 300-100 MG
2 KIT ORAL EVERY 12 HOURS
Qty: 1 BOX | Refills: 0 | Status: SHIPPED | OUTPATIENT
Start: 2022-06-07 | End: 2022-06-12

## 2022-08-18 DIAGNOSIS — N39.46 MIXED STRESS AND URGE URINARY INCONTINENCE: ICD-10-CM

## 2022-08-18 RX ORDER — TOLTERODINE TARTRATE 2 MG/1
2 TABLET, EXTENDED RELEASE ORAL 2 TIMES DAILY
Qty: 180 TABLET | Refills: 1 | Status: SHIPPED | OUTPATIENT
Start: 2022-08-18

## 2022-10-20 ENCOUNTER — OFFICE VISIT (OUTPATIENT)
Dept: INTERNAL MEDICINE CLINIC | Age: 87
End: 2022-10-20
Payer: MEDICARE

## 2022-10-20 VITALS
BODY MASS INDEX: 29.84 KG/M2 | HEIGHT: 70 IN | SYSTOLIC BLOOD PRESSURE: 127 MMHG | TEMPERATURE: 97.7 F | WEIGHT: 208.4 LBS | RESPIRATION RATE: 15 BRPM | HEART RATE: 70 BPM | OXYGEN SATURATION: 94 % | DIASTOLIC BLOOD PRESSURE: 79 MMHG

## 2022-10-20 DIAGNOSIS — N18.30 STAGE 3 CHRONIC KIDNEY DISEASE, UNSPECIFIED WHETHER STAGE 3A OR 3B CKD (HCC): ICD-10-CM

## 2022-10-20 DIAGNOSIS — G57.93 NEUROPATHY OF BOTH FEET: ICD-10-CM

## 2022-10-20 DIAGNOSIS — I50.32 CHRONIC DIASTOLIC HEART FAILURE (HCC): ICD-10-CM

## 2022-10-20 DIAGNOSIS — E03.9 ACQUIRED HYPOTHYROIDISM: Primary | ICD-10-CM

## 2022-10-20 DIAGNOSIS — Z23 NEEDS FLU SHOT: ICD-10-CM

## 2022-10-20 DIAGNOSIS — M65.331 TRIGGER MIDDLE FINGER OF RIGHT HAND: ICD-10-CM

## 2022-10-20 PROCEDURE — G8536 NO DOC ELDER MAL SCRN: HCPCS | Performed by: INTERNAL MEDICINE

## 2022-10-20 PROCEDURE — 99214 OFFICE O/P EST MOD 30 MIN: CPT | Performed by: INTERNAL MEDICINE

## 2022-10-20 PROCEDURE — G8427 DOCREV CUR MEDS BY ELIG CLIN: HCPCS | Performed by: INTERNAL MEDICINE

## 2022-10-20 PROCEDURE — G8417 CALC BMI ABV UP PARAM F/U: HCPCS | Performed by: INTERNAL MEDICINE

## 2022-10-20 PROCEDURE — G8510 SCR DEP NEG, NO PLAN REQD: HCPCS | Performed by: INTERNAL MEDICINE

## 2022-10-20 PROCEDURE — G0463 HOSPITAL OUTPT CLINIC VISIT: HCPCS | Performed by: INTERNAL MEDICINE

## 2022-10-20 PROCEDURE — 1101F PT FALLS ASSESS-DOCD LE1/YR: CPT | Performed by: INTERNAL MEDICINE

## 2022-10-20 PROCEDURE — 90694 VACC AIIV4 NO PRSRV 0.5ML IM: CPT | Performed by: INTERNAL MEDICINE

## 2022-10-20 PROCEDURE — 1090F PRES/ABSN URINE INCON ASSESS: CPT | Performed by: INTERNAL MEDICINE

## 2022-10-20 RX ORDER — LEVOTHYROXINE SODIUM 125 UG/1
125 TABLET ORAL
Qty: 90 TABLET | Refills: 1
Start: 2022-10-20 | End: 2022-11-03

## 2022-10-20 NOTE — PATIENT INSTRUCTIONS
Vaccine Information Statement    Influenza (Flu) Vaccine (Inactivated or Recombinant): What You Need to Know    Many vaccine information statements are available in Bulgarian and other languages. See www.immunize.org/vis. Hojas de información sobre vacunas están disponibles en español y en muchos otros idiomas. Visite www.immunize.org/vis. 1. Why get vaccinated? Influenza vaccine can prevent influenza (flu). Flu is a contagious disease that spreads around the [facility] every year, usually between October and May. Anyone can get the flu, but it is more dangerous for some people. Infants and young children, people 72 years and older, pregnant people, and people with certain health conditions or a weakened immune system are at greatest risk of flu complications. Pneumonia, bronchitis, sinus infections, and ear infections are examples of flu-related complications. If you have a medical condition, such as heart disease, cancer, or diabetes, flu can make it worse. Flu can cause fever and chills, sore throat, muscle aches, fatigue, cough, headache, and runny or stuffy nose. Some people may have vomiting and diarrhea, though this is more common in children than adults. In an average year, thousands of people in the [facility] die from flu, and many more are hospitalized. Flu vaccine prevents millions of illnesses and flu-related visits to the doctor each year. 2. Influenza vaccines     CDC recommends everyone 6 months and older get vaccinated every flu season. Children 6 months through 6years of age may need 2 doses during a single flu season. Everyone else needs only 1 dose each flu season. It takes about 2 weeks for protection to develop after vaccination. There are many flu viruses, and they are always changing. Each year a new flu vaccine is made to protect against the influenza viruses believed to be likely to cause disease in the upcoming flu season.  Even when the vaccine doesnt exactly match these viruses, it may still provide some protection. Influenza vaccine does not cause flu. Influenza vaccine may be given at the same time as other vaccines. 3. Talk with your health care provider    Tell your vaccination provider if the person getting the vaccine:  Has had an allergic reaction after a previous dose of influenza vaccine, or has any severe, life-threatening allergies   Has ever had Guillain-Barré Syndrome (also called GBS)    In some cases, your health care provider may decide to postpone influenza vaccination until a future visit. Influenza vaccine can be administered at any time during pregnancy. People who are or will be pregnant during influenza season should receive inactivated influenza vaccine. People with minor illnesses, such as a cold, may be vaccinated. People who are moderately or severely ill should usually wait until they recover before getting influenza vaccine. Your health care provider can give you more information. 4. Risks of a vaccine reaction    Soreness, redness, and swelling where the shot is given, fever, muscle aches, and headache can happen after influenza vaccination. There may be a very small increased risk of Guillain-Barré Syndrome (GBS) after inactivated influenza vaccine (the flu shot). [place] children who get the flu shot along with pneumococcal vaccine (PCV13) and/or DTaP vaccine at the same time might be slightly more likely to have a seizure caused by fever. Tell your health care provider if a child who is getting flu vaccine has ever had a seizure. People sometimes faint after medical procedures, including vaccination. Tell your provider if you feel dizzy or have vision changes or ringing in the ears. As with any medicine, there is a very remote chance of a vaccine causing a severe allergic reaction, other serious injury, or death. 5. What if there is a serious problem?     An allergic reaction could occur after the vaccinated person leaves the clinic. If you see signs of a severe allergic reaction (hives, swelling of the face and throat, difficulty breathing, a fast heartbeat, dizziness, or weakness), call 9-1-1 and get the person to the nearest hospital.    For other signs that concern you, call your health care provider. Adverse reactions should be reported to the Vaccine Adverse Event Reporting System (VAERS). Your health care provider will usually file this report, or you can do it yourself. Visit the VAERS website at www.vaers. [facility].gov or call 3-661.985.8971. VAERS is only for reporting reactions, and VAERS staff members do not give medical advice. 6. The National Vaccine Injury Compensation Program    The [facility] Vaccine Injury Compensation Program (VICP) is a federal program that was created to compensate people who may have been injured by certain vaccines. Claims regarding alleged injury or death due to vaccination have a time limit for filing, which may be as short as two years. Visit the VICP website at www.[facility].gov/vaccinecompensation or call 8-627.384.4917 to learn about the program and about filing a claim. 7. How can I learn more? Ask your health care provider. Call your local or state health department. Visit the website of the Food and Drug Administration (FDA) for vaccine package inserts and additional information at www.fda.gov/vaccines-blood-biologics/vaccines. Contact the Centers for Disease Control and Prevention (CDC): Call 0-616.612.7065 (6-989-YBG-INFO) or  Visit CDCs influenza website at www.cdc.gov/flu. Vaccine Information Statement   Inactivated Influenza Vaccine   8/6/2021  [address] [id number]   Department of Health and Human Services  Centers for Disease Control and Prevention    Office Use Only

## 2022-10-20 NOTE — PROGRESS NOTES
HISTORY OF PRESENT ILLNESS    Chief Complaint   Patient presents with    Labs     Nonfasting. Thyroid Problem    Immunization/Injection     Flu shot    Medication Evaluation       Presents for follow-up  Present w her aid, Halima Hogan. Reports trigger finger of the right middle finger. It is intermittent. Moderate. Denies weakness. Somewhat uncomfortable. Has not seen a specialist about this. Hypothyroidism follow-up  Reports mild fatigue  Reports tremor  denies heat/cold intolerance, bowel/skin changes or CVS symptoms, losing hair, feeling excessive energy, palpitations. Thyroid medication has been decreased since last medication check and labs. DECREASED to 1/2 pill Sundays BUT says last bottle was daily, so is taking same dose daily again for  last months. Lab Results   Component Value Date/Time    TSH 0.42 10/20/2022 03:41 PM    T4, Free 1.3 10/20/2022 03:41 PM     Lost 15 lb intentionally  Wt Readings from Last 3 Encounters:   10/20/22 208 lb 6.4 oz (94.5 kg)   04/05/22 223 lb (101.2 kg)   01/31/22 224 lb (101.6 kg)     Hypertension/CHF  Hypertension ROS: taking medications as instructed, no medication side effects noted, no TIA's, no chest pain on exertion, no dyspnea on exertion, no swelling of ankles     reports that she quit smoking about 11 years ago. Her smoking use included cigarettes. She has a 60.00 pack-year smoking history. She has never used smokeless tobacco.    reports no history of alcohol use.    BP Readings from Last 2 Encounters:   10/20/22 127/79   04/05/22 128/88           Review of Systems   All other systems reviewed and are negative, except as noted in HPI    Past Medical and Surgical History   has a past medical history of (HFpEF) heart failure with preserved ejection fraction ([address] (05/22/2012), Anemia, Atrial fibrillation ([address] (2018), Cholecystitis (2018), COPD (chronic obstructive pulmonary disease) ([address] (7/2011), COVID-19 vaccination declined, GERD (gastroesophageal reflux disease), Glaucoma suspect of both eyes, Hemorrhoids, Migraine, Mixed stress and urge urinary incontinence, Neuropathy of both feet (06/2019), OA (osteoarthritis) of knee, Obesity (BMI 30.0-34.9) (9/7/2018), Osteoporosis, Other postablative hypothyroidism (1987), Psoriasis, PSVT (paroxysmal supraventricular tachycardia) ([facility] (5/19/2012), Pulmonary nodules (7/2011), Sepsis, unspecified (5/14/2012), 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 has a past surgical history that includes hx tonsillectomy; hx hysterectomy (1974); hx cataract removal; echo 2d adult (7/15/11); nm cardiac spect w strs/rest mult (7/18/11); vas lower ext art pvr mult level seg pressures (7/18/11); echo 2d adult (5/19/12); hx colonoscopy (6/28/12); hx other surgical; and hx cholecystectomy (09/08/2018). reports that she quit smoking about 11 years ago. Her smoking use included cigarettes. She has a 60.00 pack-year smoking history. She has never used smokeless tobacco. She reports that she does not drink alcohol and does not use drugs. family history includes Cancer in her maternal grandmother and sister; Dementia in her mother; Heart Disease in her father and paternal grandfather; Milus Hawking in her mother; Seizures in her sister. Physical Exam   Nursing note and vitals reviewed. Blood pressure 127/79, pulse 70, temperature 97.7 °F (36.5 °C), temperature source Oral, resp. rate 15, height 5' 10\" (1.778 m), weight 208 lb 6.4 oz (94.5 kg), SpO2 94 %. Constitutional:  No distress. Eyes: Conjunctivae are normal.   Ears:  Hearing grossly intact  Cardiovascular: Normal rate. regular rhythm, no murmurs or gallops  No edema  Pulmonary/Chest: Effort normal.   CTAB  Musculoskeletal: moves all 4 extremities   Neurological: Alert and oriented to person, place, and time. Skin: No visible rash noted. Psychiatric: Normal mood and affect.  Behavior is normal. Assessment and Plan    Diagnoses and all orders for this visit:    1. Acquired hypothyroidism  Thyroid functions are still borderline overactive, but reasonable on 125 mcg daily. We will continue and repeat in 6 months.  -     TSH 3RD GENERATION; Future  -     T4, FREE; Future  -     Synthroid 125 mcg tablet; Take 1 Tablet by mouth Daily (before breakfast). She is taking 1 pill daily 7/2022    2. Stage 3 chronic kidney disease, unspecified whether stage 3a or 3b CKD ([facility] [facility]  Repeat labs. Taking torsemide and may need to adjust if renal function is off.  -     CBC W/O DIFF; Future  -     METABOLIC PANEL, COMPREHENSIVE; Future    3. Chronic diastolic heart failure ([facility] [address] [facility]  This condition appears to be stable and is being evaluated and managed by her specialist cardiologist no acute findings today warrant change in management plan. -     METABOLIC PANEL, COMPREHENSIVE; Future    4. Neuropathy of both feet  Neuropathy of feet is overall stable. Affects ability to manage and walk. Not painful. No current therapy recommended. 5. Trigger middle finger of right hand  Stretching demonstrated. Could consider consultation with hand specialist anytime. She defers for today. 6. Needs flu shot  -     INFLUENZA, FLUAD, (AGE 72 Y+), IM, PF, 0.5 ML        Patient Instructions   Vaccine Information Statement    Influenza (Flu) Vaccine (Inactivated or Recombinant): What You Need to Know    Many vaccine information statements are available in Romansh and other languages. See www.immunize.org/vis. Hojas de información sobre vacunas están disponibles en español y en muchos otros idiomas. Visite www.immunize.org/vis. 1. Why get vaccinated? Influenza vaccine can prevent influenza (flu). Flu is a contagious disease that spreads around the [place] every year, usually between October and May. Anyone can get the flu, but it is more dangerous for some people.  Infants and young children, people 72 years and older, pregnant people, and people with certain health conditions or a weakened immune system are at greatest risk of flu complications. Pneumonia, bronchitis, sinus infections, and ear infections are examples of flu-related complications. If you have a medical condition, such as heart disease, cancer, or diabetes, flu can make it worse. Flu can cause fever and chills, sore throat, muscle aches, fatigue, cough, headache, and runny or stuffy nose. Some people may have vomiting and diarrhea, though this is more common in children than adults. In an average year, thousands of people in the [facility] die from flu, and many more are hospitalized. Flu vaccine prevents millions of illnesses and flu-related visits to the doctor each year. 2. Influenza vaccines     CDC recommends everyone 6 months and older get vaccinated every flu season. Children 6 months through 6years of age may need 2 doses during a single flu season. Everyone else needs only 1 dose each flu season. It takes about 2 weeks for protection to develop after vaccination. There are many flu viruses, and they are always changing. Each year a new flu vaccine is made to protect against the influenza viruses believed to be likely to cause disease in the upcoming flu season. Even when the vaccine doesnt exactly match these viruses, it may still provide some protection. Influenza vaccine does not cause flu. Influenza vaccine may be given at the same time as other vaccines. 3. Talk with your health care provider    Tell your vaccination provider if the person getting the vaccine:  Has had an allergic reaction after a previous dose of influenza vaccine, or has any severe, life-threatening allergies   Has ever had Guillain-Barré Syndrome (also called GBS)    In some cases, your health care provider may decide to postpone influenza vaccination until a future visit. Influenza vaccine can be administered at any time during pregnancy. People who are or will be pregnant during influenza season should receive inactivated influenza vaccine. People with minor illnesses, such as a cold, may be vaccinated. People who are moderately or severely ill should usually wait until they recover before getting influenza vaccine. Your health care provider can give you more information. 4. Risks of a vaccine reaction    Soreness, redness, and swelling where the shot is given, fever, muscle aches, and headache can happen after influenza vaccination. There may be a very small increased risk of Guillain-Barré Syndrome (GBS) after inactivated influenza vaccine (the flu shot). [place] children who get the flu shot along with pneumococcal vaccine (PCV13) and/or DTaP vaccine at the same time might be slightly more likely to have a seizure caused by fever. Tell your health care provider if a child who is getting flu vaccine has ever had a seizure. People sometimes faint after medical procedures, including vaccination. Tell your provider if you feel dizzy or have vision changes or ringing in the ears. As with any medicine, there is a very remote chance of a vaccine causing a severe allergic reaction, other serious injury, or death. 5. What if there is a serious problem? An allergic reaction could occur after the vaccinated person leaves the clinic. If you see signs of a severe allergic reaction (hives, swelling of the face and throat, difficulty breathing, a fast heartbeat, dizziness, or weakness), call 9-1-1 and get the person to the nearest hospital.    For other signs that concern you, call your health care provider. Adverse reactions should be reported to the Vaccine Adverse Event Reporting System (VAERS). Your health care provider will usually file this report, or you can do it yourself. Visit the VAERS website at www.vaers. [facility]hs.gov or call 7-462.246.6039.  VAERS is only for reporting reactions, and VAERS staff members do not give medical advice. 6. The National Vaccine Injury Compensation Program    The [facility] Vaccine Injury Compensation Program (VICP) is a federal program that was created to compensate people who may have been injured by certain vaccines. Claims regarding alleged injury or death due to vaccination have a time limit for filing, which may be as short as two years. Visit the VICP website at www.[facility].gov/vaccinecompensation or call 6-731.880.2151 to learn about the program and about filing a claim. 7. How can I learn more? Ask your health care provider. Call your local or state health department. Visit the website of the Food and Drug Administration (FDA) for vaccine package inserts and additional information at www.fda.gov/vaccines-blood-biologics/vaccines. Contact the Centers for Disease Control and Prevention (CDC): Call 5-800.674.7739 (1-800-CDC-INFO) or  Visit CDCs influenza website at www.cdc.gov/flu. Vaccine Information Statement   Inactivated Influenza Vaccine   8/6/2021  [address] [place] [address] [id number]     [facility]  Centers for Disease Control and Prevention    Office Use Only         lab results and schedule of future lab studies reviewed with patient  reviewed medications and side effects in detail    Return to clinic for further evaluation if new symptoms develop        Current Outpatient Medications   Medication Sig    Synthroid 125 mcg tablet Take 1 Tablet by mouth Daily (before breakfast). She is taking 1 pill daily 7/2022    tolterodine (DETROL) 2 mg tablet Take 1 Tablet by mouth two (2) times a day. torsemide (DEMADEX) 10 mg tablet Take 1 Tablet by mouth daily. metoprolol succinate (TOPROL-XL) 50 mg XL tablet TAKE 1 TABLET BY MOUTH EVERY DAY    apixaban (Eliquis) 5 mg tablet TAKE 1 TABLET BY MOUTH TWICE A DAY    vit C,B-Tu-dzgpj-lutein-zeaxan (PreserVision AREDS-2) 250-90-40-1 mg cap capsule Take 2 Caps by mouth.     Breo Ellipta 100-25 mcg/dose inhaler TAKE 1 PUFF BY MOUTH EVERY DAY. RINSE MOUTH AFTER EACH USE    acetaminophen (TYLENOL) 650 mg TbER Take 650 mg by mouth every eight (8) hours. One in the morning, one at night    cholecalciferol (VITAMIN D3) 25 mcg (1,000 unit) cap Take 1,000 Units by mouth daily. albuterol (PROVENTIL HFA) 90 mcg/actuation inhaler Take 2 Puffs by inhalation every four (4) hours as needed for Wheezing. (Patient taking differently: Take 2 Puffs by inhalation as needed for Wheezing.)     No current facility-administered medications for this visit.

## 2022-10-21 LAB
ALBUMIN SERPL-MCNC: 3.6 G/DL (ref 3.5–5)
ALBUMIN/GLOB SERPL: 1 {RATIO} (ref 1.1–2.2)
ALP SERPL-CCNC: 118 U/L (ref 45–117)
ALT SERPL-CCNC: 14 U/L (ref 12–78)
ANION GAP SERPL CALC-SCNC: 6 MMOL/L (ref 5–15)
AST SERPL-CCNC: 8 U/L (ref 15–37)
BILIRUB SERPL-MCNC: 0.3 MG/DL (ref 0.2–1)
BUN SERPL-MCNC: 40 MG/DL (ref 6–20)
BUN/CREAT SERPL: 25 (ref 12–20)
CALCIUM SERPL-MCNC: 9.6 MG/DL (ref 8.5–10.1)
CHLORIDE SERPL-SCNC: 107 MMOL/L (ref 97–108)
CO2 SERPL-SCNC: 27 MMOL/L (ref 21–32)
CREAT SERPL-MCNC: 1.63 MG/DL (ref 0.55–1.02)
ERYTHROCYTE [DISTWIDTH] IN BLOOD BY AUTOMATED COUNT: 13.9 % (ref 11.5–14.5)
GLOBULIN SER CALC-MCNC: 3.6 G/DL (ref 2–4)
GLUCOSE SERPL-MCNC: 95 MG/DL (ref 65–100)
HCT VFR BLD AUTO: 42.8 % (ref 35–47)
HGB BLD-MCNC: 13.3 G/DL (ref 11.5–16)
MCH RBC QN AUTO: 30.9 PG (ref 26–34)
MCHC RBC AUTO-ENTMCNC: 31.1 G/DL (ref 30–36.5)
MCV RBC AUTO: 99.3 FL (ref 80–99)
NRBC # BLD: 0 K/UL (ref 0–0.01)
NRBC BLD-RTO: 0 PER 100 WBC
PLATELET # BLD AUTO: 244 K/UL (ref 150–400)
PMV BLD AUTO: 10.1 FL (ref 8.9–12.9)
POTASSIUM SERPL-SCNC: 5.2 MMOL/L (ref 3.5–5.1)
PROT SERPL-MCNC: 7.2 G/DL (ref 6.4–8.2)
RBC # BLD AUTO: 4.31 M/UL (ref 3.8–5.2)
SODIUM SERPL-SCNC: 140 MMOL/L (ref 136–145)
T4 FREE SERPL-MCNC: 1.3 NG/DL (ref 0.8–1.5)
TSH SERPL DL<=0.05 MIU/L-ACNC: 0.42 UIU/ML (ref 0.36–3.74)
WBC # BLD AUTO: 6.9 K/UL (ref 3.6–11)

## 2022-10-28 ENCOUNTER — TELEPHONE (OUTPATIENT)
Dept: INTERNAL MEDICINE CLINIC | Age: 87
End: 2022-10-28

## 2022-10-28 NOTE — TELEPHONE ENCOUNTER
Spoke with patient and updated on Dr. Britt Gonzales comments and recommendations and 3 month follow appt scheduled for 1/25/23  @ 1:00 PM .  She states understanding and grateful for the call.

## 2022-10-28 NOTE — TELEPHONE ENCOUNTER
We are balancing her swelling with medication causing dehydration and worsening kidney function. It is okay to take torsemide 10 mg daily, but please try to drink a little bit more water and I do want to repeat her kidney function within the next 3 months.

## 2022-10-28 NOTE — TELEPHONE ENCOUNTER
Pt called in advised she has started taking the torsemide 10 mg every other day but ankles were swelling so she is taking the 10 mg everyday and is feeling better.   If any issues or concerns please call her at [phone]

## 2022-11-03 DIAGNOSIS — E03.9 ACQUIRED HYPOTHYROIDISM: ICD-10-CM

## 2022-11-03 RX ORDER — LEVOTHYROXINE SODIUM 125 UG/1
TABLET ORAL
Qty: 90 TABLET | Refills: 1 | Status: SHIPPED | OUTPATIENT
Start: 2022-11-03

## 2023-01-19 NOTE — TELEPHONE ENCOUNTER
Refill per VO of Dr. Juan Josue  Last appt: 4/5/22  Future Appointments   Date Time Provider Melania Samuel   1/25/2023  1:00 PM Francis Claire MD [facility] BS AMB   4/11/2023  1:00 PM Minda Holt MD [facility] BS AMB       Requested Prescriptions     Signed Prescriptions Disp Refills    apixaban (Eliquis) 5 mg tablet 180 Tablet 1     Sig: TAKE 1 TABLET BY MOUTH TWICE A DAY     Authorizing Provider: Regina Mendez     Ordering User: Oziel Weir

## 2023-01-23 ENCOUNTER — TELEPHONE (OUTPATIENT)
Dept: INTERNAL MEDICINE CLINIC | Age: 88
End: 2023-01-23

## 2023-01-23 NOTE — TELEPHONE ENCOUNTER
----- Message from Dulce Solorio sent at 1/23/2023 12:00 PM EST -----  Subject: Message to Provider    QUESTIONS  Information for Provider? Patient would like to be put on a cancellation   list after Jan 31 2023  ---------------------------------------------------------------------------  --------------  Staci Mirza INFO  [phone]; OK to leave message on voicemail  ---------------------------------------------------------------------------  --------------  SCRIPT ANSWERS  Relationship to Patient?  Self

## 2023-01-23 NOTE — TELEPHONE ENCOUNTER
Patient states she wants to canc her apt one the 25th due to possible rain, states she has neuropathy causing difficulty walking and it unable to come in during inclement weather . States she has enough  thyroid medication until Feb 9th but then will run out, declined to wait for first afternoon appt on 02/15, hoping to be seen prior to running out of medication. Please advise.        C/b [phone]

## 2023-01-23 NOTE — TELEPHONE ENCOUNTER
Spoke with patient and she is requesting to move her appt up because she will run out of medication before the one that is schedules. Re-Scheduled for 2/1/23 at 2:00 PM. Grateful for the call.

## 2023-01-30 NOTE — TELEPHONE ENCOUNTER
PSR called and spoke with patient at nurse's request to make sure she was aware of her upcoming appointment. Patient was thankful for the call.

## 2023-02-01 ENCOUNTER — OFFICE VISIT (OUTPATIENT)
Dept: INTERNAL MEDICINE CLINIC | Age: 88
End: 2023-02-01
Payer: MEDICARE

## 2023-02-01 VITALS
OXYGEN SATURATION: 95 % | SYSTOLIC BLOOD PRESSURE: 111 MMHG | DIASTOLIC BLOOD PRESSURE: 74 MMHG | TEMPERATURE: 97.7 F | RESPIRATION RATE: 14 BRPM | HEIGHT: 70 IN | BODY MASS INDEX: 29.03 KG/M2 | WEIGHT: 202.8 LBS | HEART RATE: 73 BPM

## 2023-02-01 DIAGNOSIS — N18.30 STAGE 3 CHRONIC KIDNEY DISEASE, UNSPECIFIED WHETHER STAGE 3A OR 3B CKD (HCC): ICD-10-CM

## 2023-02-01 DIAGNOSIS — E03.9 ACQUIRED HYPOTHYROIDISM: ICD-10-CM

## 2023-02-01 DIAGNOSIS — E61.1 IRON DEFICIENCY: ICD-10-CM

## 2023-02-01 DIAGNOSIS — Z00.00 MEDICARE ANNUAL WELLNESS VISIT, SUBSEQUENT: Primary | ICD-10-CM

## 2023-02-01 DIAGNOSIS — L65.9 HAIR LOSS: ICD-10-CM

## 2023-02-01 DIAGNOSIS — J44.9 CHRONIC OBSTRUCTIVE PULMONARY DISEASE, UNSPECIFIED COPD TYPE (HCC): ICD-10-CM

## 2023-02-01 DIAGNOSIS — I50.32 CHRONIC DIASTOLIC HEART FAILURE (HCC): ICD-10-CM

## 2023-02-01 DIAGNOSIS — I48.0 PAF (PAROXYSMAL ATRIAL FIBRILLATION) (HCC): ICD-10-CM

## 2023-02-01 PROCEDURE — G0463 HOSPITAL OUTPT CLINIC VISIT: HCPCS | Performed by: INTERNAL MEDICINE

## 2023-02-02 LAB
ALBUMIN SERPL-MCNC: 3.7 G/DL (ref 3.5–5)
ALBUMIN/GLOB SERPL: 1.1 (ref 1.1–2.2)
ALP SERPL-CCNC: 103 U/L (ref 45–117)
ALT SERPL-CCNC: 17 U/L (ref 12–78)
ANION GAP SERPL CALC-SCNC: 5 MMOL/L (ref 5–15)
AST SERPL-CCNC: 9 U/L (ref 15–37)
BILIRUB SERPL-MCNC: 0.4 MG/DL (ref 0.2–1)
BUN SERPL-MCNC: 44 MG/DL (ref 6–20)
BUN/CREAT SERPL: 27 (ref 12–20)
CALCIUM SERPL-MCNC: 9.1 MG/DL (ref 8.5–10.1)
CHLORIDE SERPL-SCNC: 104 MMOL/L (ref 97–108)
CO2 SERPL-SCNC: 30 MMOL/L (ref 21–32)
CREAT SERPL-MCNC: 1.66 MG/DL (ref 0.55–1.02)
FERRITIN SERPL-MCNC: 64 NG/ML (ref 8–252)
GLOBULIN SER CALC-MCNC: 3.3 G/DL (ref 2–4)
GLUCOSE SERPL-MCNC: 85 MG/DL (ref 65–100)
IRON SATN MFR SERPL: 23 % (ref 20–50)
IRON SERPL-MCNC: 61 UG/DL (ref 35–150)
POTASSIUM SERPL-SCNC: 4.6 MMOL/L (ref 3.5–5.1)
PROT SERPL-MCNC: 7 G/DL (ref 6.4–8.2)
SODIUM SERPL-SCNC: 139 MMOL/L (ref 136–145)
T4 FREE SERPL-MCNC: 1.5 NG/DL (ref 0.8–1.5)
TIBC SERPL-MCNC: 263 UG/DL (ref 250–450)
TSH SERPL DL<=0.05 MIU/L-ACNC: 0.12 UIU/ML (ref 0.36–3.74)

## 2023-02-03 NOTE — PROGRESS NOTES
This is a Subsequent Medicare Annual Wellness Visit providing Personalized Prevention Plan Services (PPPS) (Performed 12 months after initial AWV and PPPS )    I have reviewed the patient's medical history in detail and updated the computerized patient record. She is with her aid/friend Christiano Delgadillo. Walking with a rolling walker. OAB/intcontinence. Taking detrol 2 mg bid. Atrial fibrillation. She is followed by Dr. Dunia Robles. Diastolic congestive heart failure. Denies any increased edema or shortness of breath. Recommended to maintain Eliquis and beta-blocker long-term. High risk of recurrence. Takes torsemide. Has declined statin therapy. Hypothyroidism follow-up  Taking synthroid 125 mcg daily  Reports no fatigue. denies heat/cold intolerance, bowel/skin changes or CVS symptoms, losing hair, feeling excessive energy, tremulousness, palpitations. Thyroid medication has been unchanged since last medication check and labs. Lab Results   Component Value Date/Time    TSH 0.12 (L) 02/01/2023 02:54 PM    T4, Free 1.5 02/01/2023 02:54 PM   .  Wt Readings from Last 3 Encounters:   02/01/23 202 lb 12.8 oz (92 kg)   10/20/22 208 lb 6.4 oz (94.5 kg)   04/05/22 223 lb (101.2 kg)         History     Past Medical History:   Diagnosis Date    (HFpEF) heart failure with preserved ejection fraction ([address] 05/22/2012    Dr Darby Fleischer.  first presented 2012 in the setting of sepsis    Anemia     hx b12 def age 22-31s    Atrial fibrillation ([address] 2018    Dr Darby Fleischer    Cholecystitis 2018    COPD (chronic obstructive pulmonary disease) ([address] 7/2011    FEV1 1 L 7/2011. Dr. Paras Armenta    COVID-19 vaccination declined     GERD (gastroesophageal reflux disease)     Glaucoma suspect of both eyes     Dr Gladys Babinski    Hemorrhoids     Migraine     Mixed stress and urge urinary incontinence     Neuropathy of both feet 06/2019    consult Dr. Dakota Fournier. OA (osteoarthritis) of knee     R, Dr. Daya Momin.   Euflexa injections x3. has walker Obesity (BMI 30.0-34.9) 9/7/2018    Osteoporosis     declines medication tx    Other postablative hypothyroidism 1987    ROSAS 1987  saw Dr. Mandy Chilel     left ear    PSVT (paroxysmal supraventricular tachycardia) ([facility] 5/19/2012    Dr. Jessica Lai    Pulmonary nodules 7/2011    RLL x2.  stable 2/2012  Dr. 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 Dr. Gifty Stone    menorrhagia    HX OTHER SURGICAL      Echo 7/31/14 - LVEF 60-65%, grade 1 DD    HX TONSILLECTOMY      NM CARDIAC SPECT W STRS/REST MULT  7/18/11    small sized fixed defect in inferolateral wall may represent scar or abd attenuation; EF 80%    VAS LOWER EXT ART PVR MULT LEVEL SEG PRESSURES  7/18/11    normal       Current Outpatient Medications   Medication Sig    apixaban (Eliquis) 5 mg tablet TAKE 1 TABLET BY MOUTH TWICE A DAY    Synthroid 125 mcg tablet TAKE 1 TABLET BY MOUTH EVERY DAY BEFORE BREAKFAST    tolterodine (DETROL) 2 mg tablet Take 1 Tablet by mouth two (2) times a day. torsemide (DEMADEX) 10 mg tablet Take 1 Tablet by mouth daily. metoprolol succinate (TOPROL-XL) 50 mg XL tablet TAKE 1 TABLET BY MOUTH EVERY DAY    vit C,P-Uq-owuom-lutein-zeaxan (PreserVision AREDS-2) 250-90-40-1 mg cap capsule Take 2 Caps by mouth. Breo Ellipta 100-25 mcg/dose inhaler TAKE 1 PUFF BY MOUTH EVERY DAY. RINSE MOUTH AFTER EACH USE    acetaminophen (TYLENOL) 650 mg TbER Take 650 mg by mouth every eight (8) hours.  One in the morning, one at night    cholecalciferol (VITAMIN D3) 25 mcg (1,000 unit) cap Take 1,000 Units by mouth daily. albuterol (PROVENTIL HFA) 90 mcg/actuation inhaler Take 2 Puffs by inhalation every four (4) hours as needed for Wheezing. (Patient taking differently: Take 2 Puffs by inhalation as needed for Wheezing.)     No current facility-administered medications for this visit. Allergies   Allergen Reactions    Ciprofloxacin (Bulk) Rash    Diltiazem Rash    Piperacillin Other (comments)     Appears to have tolerated 5/13/12-5/23/12       Family History   Problem Relation Age of Onset    Cancer Maternal Grandmother         breast    Cancer Sister         lymphopma    Heart Disease Father     Heart Disease Paternal Grandfather     Seizures Sister     OSTEOARTHRITIS Mother     Dementia Mother     Malignant Hyperthermia Neg Hx     Pseudocholinesterase Deficiency Neg Hx     Delayed Awakening Neg Hx     Post-op Nausea/Vomiting Neg Hx     Emergence Delirium Neg Hx     Post-op Cognitive Dysfunction Neg Hx     Other Neg Hx         reports that she quit smoking about 11 years ago. Her smoking use included cigarettes. She has a 60.00 pack-year smoking history. She has never used smokeless tobacco.   reports no history of alcohol use. Depression Risk Factor Screening:       Alcohol Risk Factor Screening: On any occasion during the past 3 months, have you had more than 3 drinks containing alcohol? No    Do you average more than 14 drinks per week? No      Functional Ability and Level of Safety:     Hearing Loss   mild    Activities of Daily Living   Self-care. Requires assistance with: no ADLs    Fall Risk     Fall Risk Assessment, last 12 mths 2/1/2023   Able to walk? Yes   Fall in past 12 months? 0   Do you feel unsteady? 0   Are you worried about falling 0   Fall with injury? -         Abuse Screen   Patient is not abused    Review of Systems   A comprehensive review of systems was negative except for that written in the HPI.     Physical Examination     Evaluation of Cognitive Function:  Mood/affect:  neutral, happy  Appearance: age appropriate  Family member/caregiver input: none    Blood pressure 111/74, pulse 73, temperature 97.7 °F (36.5 °C), temperature source Oral, resp. rate 14, height 5' 10\" (1.778 m), weight 202 lb 12.8 oz (92 kg), SpO2 95 %. General appearance: alert, cooperative, no distress, appears stated age  Neck: supple, symmetrical, trachea midline, no adenopathy, thyroid: not enlarged, symmetric, no tenderness/mass/nodules, no carotid bruit and no JVD  Lungs: clear to auscultation bilaterally  Antalgic gait. Using rolling walker. Some paraspinal and muscle tenderness of right lower spine. No overt weakness. Heart: regular rate and rhythm, S1, S2 normal, no murmur, click, rub or gallop  Extremities: extremities normal, atraumatic, no cyanosis or edema  Scaly circular rash of left lower extremity of lateral ankle and somewhat of the inner leg. Patient Care Team:  Elisha Chiu MD as PCP - General (Internal Medicine Physician)  Elisha Chiu MD as PCP - [facility]  Sadaf James MD (Cardiovascular Disease Physician)      Advice/Referrals/Counseling   Education and counseling provided. See below for specific orders    Diagnoses and all orders for this visit:    1. Medicare annual wellness visit, subsequent  Declines COVID-19 vaccine. She has been previously counseled. Declines Shingrix vaccination at this time. Recommended. 2. Chronic obstructive pulmonary disease, unspecified COPD type ([address]  Appears to be well controlled on current therapy with Breo. I do have a little bit of question about whether or not she is taking this medication at this time. We will refill anytime if she needs. 3. Chronic diastolic heart failure ([address]  This condition appears to be stable and is being evaluated and managed by her specialist Dr Triston Strauss. No acute findings today warrant change in management plan.       4. PAF (paroxysmal atrial fibrillation) ([facility]  This condition appears to be stable and is being evaluated and managed by her specialist Callum Sanon. Continue Eliquis and metoprolol. .   No acute findings today warrant change in management plan. -     METABOLIC PANEL, COMPREHENSIVE; Future    5. Stage 3 chronic kidney disease, unspecified whether stage 3a or 3b CKD ([facility]  Unclear current status. Repeat renal function. Monitor. Adjust diuretic if needed. -     METABOLIC PANEL, COMPREHENSIVE; Future    6. Iron deficiency  History of iron deficiency. Not currently taking an iron supplement. Does report some hair loss. We will repeat. -     FERRITIN; Future  -     IRON PROFILE; Future    7. Hair loss  Mild. Check iron levels and thyroid function. Continue to monitor. 8. Acquired hypothyroidism  Overcontrolled. TSH is low. Decrease Synthroid o 112 mcg daily. Repeat labs in 4 months  Currently asymptomatic other than possibly hair loss as an effect.    -     T4, FREE; Future  -     TSH 3RD GENERATION; Future      Potential medication side effects were discussed with the patient; let me know if any occur.   Return for yearly Annual Wellness Visits

## 2023-02-06 DIAGNOSIS — E03.9 ACQUIRED HYPOTHYROIDISM: Primary | ICD-10-CM

## 2023-02-06 RX ORDER — LEVOTHYROXINE SODIUM 112 UG/1
112 TABLET ORAL
Qty: 90 TABLET | Refills: 0 | Status: SHIPPED | OUTPATIENT
Start: 2023-02-06

## 2023-02-25 DIAGNOSIS — N39.46 MIXED STRESS AND URGE URINARY INCONTINENCE: ICD-10-CM

## 2023-02-25 RX ORDER — TOLTERODINE TARTRATE 2 MG/1
TABLET, EXTENDED RELEASE ORAL
Qty: 180 TABLET | Refills: 1 | Status: SHIPPED | OUTPATIENT
Start: 2023-02-25

## 2023-04-28 DIAGNOSIS — I50.32 CHRONIC DIASTOLIC HEART FAILURE (HCC): ICD-10-CM

## 2023-04-28 RX ORDER — TORSEMIDE 10 MG/1
TABLET ORAL
Qty: 90 TABLET | Refills: 0 | Status: SHIPPED | OUTPATIENT
Start: 2023-04-28

## 2023-04-28 RX ORDER — METOPROLOL SUCCINATE 50 MG/1
TABLET, EXTENDED RELEASE ORAL
Qty: 90 TABLET | Refills: 0 | Status: SHIPPED | OUTPATIENT
Start: 2023-04-28

## 2023-04-28 NOTE — TELEPHONE ENCOUNTER
Refill per VO of Dr. Ninoska Paetl  Last appt: 4/5/2022  Future Appointments   Date Time Provider Melania Samuel   5/3/2023  2:00 PM Judith Claire MD [facility] BS AMB   5/16/2023  1:00 PM MD MALORIE Gonzalez BS AMB   8/2/2023  2:00 PM Judith Velázquez MD [facility] BS AMB       Requested Prescriptions     Signed Prescriptions Disp Refills    metoprolol succinate (TOPROL-XL) 50 mg XL tablet 90 Tablet 0     Sig: TAKE 1 TABLET BY MOUTH EVERY DAY     Authorizing Provider: Anish Trevino     Ordering User: Gwen Covington    torsemide (DEMADEX) 10 mg tablet 90 Tablet 0     Sig: TAKE 1 TABLET BY MOUTH EVERY DAY     Authorizing Provider: Anish Trevino     Ordering User: Gwen Covington

## 2023-05-03 ENCOUNTER — OFFICE VISIT (OUTPATIENT)
Dept: INTERNAL MEDICINE CLINIC | Age: 88
End: 2023-05-03
Payer: MEDICARE

## 2023-05-03 VITALS
BODY MASS INDEX: 29.18 KG/M2 | TEMPERATURE: 97.6 F | WEIGHT: 203.8 LBS | RESPIRATION RATE: 16 BRPM | DIASTOLIC BLOOD PRESSURE: 62 MMHG | SYSTOLIC BLOOD PRESSURE: 127 MMHG | HEIGHT: 70 IN | OXYGEN SATURATION: 94 % | HEART RATE: 65 BPM

## 2023-05-03 DIAGNOSIS — E03.9 ACQUIRED HYPOTHYROIDISM: Chronic | ICD-10-CM

## 2023-05-03 DIAGNOSIS — M25.512 CHRONIC LEFT SHOULDER PAIN: Primary | ICD-10-CM

## 2023-05-03 DIAGNOSIS — I50.32 CHRONIC HEART FAILURE WITH PRESERVED EJECTION FRACTION (HCC): ICD-10-CM

## 2023-05-03 DIAGNOSIS — G89.29 CHRONIC LEFT SHOULDER PAIN: Primary | ICD-10-CM

## 2023-05-03 DIAGNOSIS — N18.30 STAGE 3 CHRONIC KIDNEY DISEASE, UNSPECIFIED WHETHER STAGE 3A OR 3B CKD (HCC): Chronic | ICD-10-CM

## 2023-05-03 PROCEDURE — G8427 DOCREV CUR MEDS BY ELIG CLIN: HCPCS | Performed by: INTERNAL MEDICINE

## 2023-05-03 PROCEDURE — G8417 CALC BMI ABV UP PARAM F/U: HCPCS | Performed by: INTERNAL MEDICINE

## 2023-05-03 PROCEDURE — 1090F PRES/ABSN URINE INCON ASSESS: CPT | Performed by: INTERNAL MEDICINE

## 2023-05-03 PROCEDURE — G0463 HOSPITAL OUTPT CLINIC VISIT: HCPCS | Performed by: INTERNAL MEDICINE

## 2023-05-03 PROCEDURE — 99213 OFFICE O/P EST LOW 20 MIN: CPT | Performed by: INTERNAL MEDICINE

## 2023-05-03 PROCEDURE — 1101F PT FALLS ASSESS-DOCD LE1/YR: CPT | Performed by: INTERNAL MEDICINE

## 2023-05-03 PROCEDURE — G8510 SCR DEP NEG, NO PLAN REQD: HCPCS | Performed by: INTERNAL MEDICINE

## 2023-05-03 PROCEDURE — G8536 NO DOC ELDER MAL SCRN: HCPCS | Performed by: INTERNAL MEDICINE

## 2023-05-04 DIAGNOSIS — E03.9 ACQUIRED HYPOTHYROIDISM: ICD-10-CM

## 2023-05-04 RX ORDER — LEVOTHYROXINE SODIUM 112 UG/1
112 TABLET ORAL
Qty: 90 TABLET | Refills: 1 | Status: SHIPPED | OUTPATIENT
Start: 2023-05-04

## 2023-05-05 ENCOUNTER — TELEPHONE (OUTPATIENT)
Dept: INTERNAL MEDICINE CLINIC | Age: 88
End: 2023-05-05

## 2023-05-16 ENCOUNTER — OFFICE VISIT (OUTPATIENT)
Age: 88
End: 2023-05-16
Payer: MEDICARE

## 2023-05-16 VITALS
BODY MASS INDEX: 29.2 KG/M2 | OXYGEN SATURATION: 96 % | HEIGHT: 70 IN | SYSTOLIC BLOOD PRESSURE: 110 MMHG | WEIGHT: 204 LBS | HEART RATE: 75 BPM | DIASTOLIC BLOOD PRESSURE: 80 MMHG

## 2023-05-16 DIAGNOSIS — I48.0 PAF (PAROXYSMAL ATRIAL FIBRILLATION) (HCC): Primary | ICD-10-CM

## 2023-05-16 DIAGNOSIS — I50.32 CHRONIC DIASTOLIC HEART FAILURE (HCC): ICD-10-CM

## 2023-05-16 PROCEDURE — 99214 OFFICE O/P EST MOD 30 MIN: CPT | Performed by: SPECIALIST

## 2023-05-16 PROCEDURE — 1123F ACP DISCUSS/DSCN MKR DOCD: CPT | Performed by: SPECIALIST

## 2023-05-16 PROCEDURE — G8419 CALC BMI OUT NRM PARAM NOF/U: HCPCS | Performed by: SPECIALIST

## 2023-05-16 PROCEDURE — 93010 ELECTROCARDIOGRAM REPORT: CPT | Performed by: SPECIALIST

## 2023-05-16 PROCEDURE — 93005 ELECTROCARDIOGRAM TRACING: CPT | Performed by: SPECIALIST

## 2023-05-16 PROCEDURE — 1036F TOBACCO NON-USER: CPT | Performed by: SPECIALIST

## 2023-05-16 PROCEDURE — 1090F PRES/ABSN URINE INCON ASSESS: CPT | Performed by: SPECIALIST

## 2023-05-16 PROCEDURE — G8427 DOCREV CUR MEDS BY ELIG CLIN: HCPCS | Performed by: SPECIALIST

## 2023-05-16 RX ORDER — TOLTERODINE TARTRATE 2 MG/1
2 TABLET, EXTENDED RELEASE ORAL 2 TIMES DAILY
COMMUNITY

## 2023-05-16 RX ORDER — LEVOTHYROXINE SODIUM 112 UG/1
112 TABLET ORAL DAILY
COMMUNITY

## 2023-05-16 RX ORDER — ACETAMINOPHEN 325 MG/1
650 TABLET ORAL 2 TIMES DAILY
COMMUNITY

## 2023-05-16 RX ORDER — TORSEMIDE 10 MG/1
10 TABLET ORAL DAILY
COMMUNITY

## 2023-05-16 RX ORDER — METOPROLOL SUCCINATE 50 MG/1
50 TABLET, EXTENDED RELEASE ORAL DAILY
COMMUNITY

## 2023-05-16 NOTE — PROGRESS NOTES
Yamila Crowell MD. [facility]          Patient: Shanae Tavares  : 1932      Today's Date: 2023        HISTORY OF PRESENT ILLNESS:     History of Present Illness:    Doing well. Chronic class 2-3 BUENO. No CP. No orthopnea. PAST MEDICAL HISTORY:     Past Medical History:   Diagnosis Date    (HFpEF) heart failure with preserved ejection fraction ([address] 2012    Dr Thelma Broussard.  first presented  in the setting of sepsis    Anemia     hx b12 def age 22-31s    Atrial fibrillation ([address]     Dr Thelma Broussard    Cholecystitis 2018    COPD (chronic obstructive pulmonary disease) ([facility] 2011    FEV1 1 L 2011. Dr. Joshua Brown    COVID-19 vaccination declined     GERD (gastroesophageal reflux disease)     Glaucoma suspect of both eyes     Dr Vidal Right    Hemorrhoids     Migraine     Mixed stress and urge urinary incontinence     Neuropathy of both feet 2019    consult Dr. Miller Bah. OA (osteoarthritis) of knee     R, Dr. Loraine Gaucher. Euflexa injections x3. has walker    Obesity (BMI 30.0-34.9) 2018    Osteoporosis     declines medication tx    Other postablative hypothyroidism     LEONARDO   saw Dr. Aisha Stephens     left ear    PSVT (paroxysmal supraventricular tachycardia) ([facility] 2012    Dr. Thelma Broussard    Pulmonary nodules 2011    RLL x2.  stable 2012  Dr. 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  Dr. Isaura Mancuso    ECHO 2D ADULT  7/15/11    mild LVH, EF 65%, normal atrial sizes, indeterminate diastolic function, no sig valvular disease, RVSP 41    ECHO 2D ADULT  12    mild LVH, EF 55-60%     HYSTERECTOMY (CERVIX STATUS UNKNOWN)  1974    menorrhagia    IR TUNNELED CATHETER PLACEMENT GREATER THAN 5 YEARS  2018    IR TUNNELED

## 2023-05-16 NOTE — PROGRESS NOTES
Chief Complaint   Patient presents with    Irregular Heart Beat     PAF, HFrEF, PSVT     Vitals:    05/16/23 1316   BP: 110/80   Site: Left Upper Arm   Position: Sitting   Cuff Size: Medium Adult   Pulse: 75   SpO2: 96%   Weight: 204 lb (92.5 kg)   Height: 5' 10\" (1.778 m)      /80 (Site: Left Upper Arm, Position: Sitting, Cuff Size: Medium Adult)   Pulse 75   Ht 5' 10\" (1.778 m)   Wt 204 lb (92.5 kg)   SpO2 96%   BMI 29.27 kg/m²      Chest pain             YES  SOB                       YES COPD  Swelling                 FEET AND ANKLES  Dizziness               NO  Recent hospital     NO  Refills                    NO         Covid vaccination  NO  COVID                    YES

## 2023-05-22 RX ORDER — SENNOSIDES 8.6 MG
650 CAPSULE ORAL EVERY 8 HOURS
COMMUNITY

## 2023-05-22 RX ORDER — TORSEMIDE 10 MG/1
1 TABLET ORAL DAILY
COMMUNITY
Start: 2023-04-28

## 2023-05-22 RX ORDER — METOPROLOL SUCCINATE 50 MG/1
1 TABLET, EXTENDED RELEASE ORAL DAILY
COMMUNITY
Start: 2023-04-28

## 2023-05-22 RX ORDER — ALBUTEROL SULFATE 90 UG/1
2 AEROSOL, METERED RESPIRATORY (INHALATION) EVERY 4 HOURS PRN
COMMUNITY
Start: 2014-03-06

## 2023-05-22 RX ORDER — FLUTICASONE FUROATE AND VILANTEROL 100; 25 UG/1; UG/1
POWDER RESPIRATORY (INHALATION)
COMMUNITY
Start: 2021-02-09

## 2023-05-22 RX ORDER — LEVOTHYROXINE SODIUM 112 UG/1
112 TABLET ORAL
COMMUNITY
Start: 2023-05-04

## 2023-05-22 RX ORDER — TOLTERODINE TARTRATE 2 MG/1
1 TABLET, EXTENDED RELEASE ORAL 2 TIMES DAILY
COMMUNITY
Start: 2023-02-25

## 2023-07-25 DIAGNOSIS — I50.32 CHRONIC DIASTOLIC (CONGESTIVE) HEART FAILURE (HCC): ICD-10-CM

## 2023-07-25 RX ORDER — TORSEMIDE 10 MG/1
TABLET ORAL
Qty: 90 TABLET | Refills: 3 | Status: SHIPPED | OUTPATIENT
Start: 2023-07-25

## 2023-07-25 NOTE — TELEPHONE ENCOUNTER
Refill per VO of Dr. Stanislav Bernal  Last appt: 5/16/2023    Future Appointments   Date Time Provider [address]   11/1/2023  2:00 PM Waleska Mendoza MD [facility] BS AMB   5/20/2024  1:40 PM Alexandria Herman MD [facility] BS AMB       Requested Prescriptions     Signed Prescriptions Disp Refills    torsemide (DEMADEX) 10 MG tablet 90 tablet 3     Sig: TAKE 1 TABLET BY MOUTH EVERY DAY     Authorizing Provider: Alexandria Herman     Ordering User: Zo Gil   \" - Continue diuretic (torsemide) but would limit its use \"

## 2023-08-07 RX ORDER — METOPROLOL SUCCINATE 50 MG/1
TABLET, EXTENDED RELEASE ORAL
Qty: 90 TABLET | Refills: 3 | Status: SHIPPED | OUTPATIENT
Start: 2023-08-07

## 2023-08-07 NOTE — TELEPHONE ENCOUNTER
Refill per VO of Dr. Joby Johnson  Last appt: 5/16/2023    Future Appointments   Date Time Provider [address]   11/1/2023  2:00 PM Gisele Mensah MD [facility] BS AMB   5/20/2024  1:40 PM Vandana England MD [facility] BS AMB       Requested Prescriptions     Signed Prescriptions Disp Refills    metoprolol succinate (TOPROL XL) 50 MG extended release tablet 90 tablet 3     Sig: TAKE 1 TABLET BY MOUTH EVERY DAY     Authorizing Provider: Vandana England     Ordering User: Joey Pedersen

## 2023-09-09 DIAGNOSIS — N39.46 MIXED INCONTINENCE: ICD-10-CM

## 2023-09-11 RX ORDER — TOLTERODINE TARTRATE 2 MG/1
2 TABLET, EXTENDED RELEASE ORAL 2 TIMES DAILY
Qty: 180 TABLET | Refills: 1 | Status: SHIPPED | OUTPATIENT
Start: 2023-09-11

## 2023-10-08 DIAGNOSIS — E03.9 HYPOTHYROIDISM, UNSPECIFIED: ICD-10-CM

## 2023-10-09 RX ORDER — LEVOTHYROXINE SODIUM 112 MCG
112 TABLET ORAL
Qty: 90 TABLET | Refills: 1 | Status: SHIPPED | OUTPATIENT
Start: 2023-10-09

## 2023-11-01 ENCOUNTER — OFFICE VISIT (OUTPATIENT)
Age: 88
End: 2023-11-01
Payer: MEDICARE

## 2023-11-01 VITALS
RESPIRATION RATE: 17 BRPM | TEMPERATURE: 97.5 F | HEART RATE: 82 BPM | DIASTOLIC BLOOD PRESSURE: 68 MMHG | WEIGHT: 200 LBS | SYSTOLIC BLOOD PRESSURE: 103 MMHG | BODY MASS INDEX: 28.63 KG/M2 | OXYGEN SATURATION: 90 % | HEIGHT: 70 IN

## 2023-11-01 DIAGNOSIS — G57.93 NEUROPATHY OF BOTH FEET: ICD-10-CM

## 2023-11-01 DIAGNOSIS — Z23 NEEDS FLU SHOT: ICD-10-CM

## 2023-11-01 DIAGNOSIS — I50.32 CHRONIC DIASTOLIC (CONGESTIVE) HEART FAILURE (HCC): ICD-10-CM

## 2023-11-01 DIAGNOSIS — N18.31 STAGE 3A CHRONIC KIDNEY DISEASE (HCC): ICD-10-CM

## 2023-11-01 DIAGNOSIS — N39.46 MIXED STRESS AND URGE URINARY INCONTINENCE: ICD-10-CM

## 2023-11-01 DIAGNOSIS — I48.0 PAF (PAROXYSMAL ATRIAL FIBRILLATION) (HCC): ICD-10-CM

## 2023-11-01 DIAGNOSIS — E03.9 ACQUIRED HYPOTHYROIDISM: Primary | ICD-10-CM

## 2023-11-01 PROBLEM — E66.811 OBESITY (BMI 30.0-34.9): Status: RESOLVED | Noted: 2018-09-07 | Resolved: 2023-11-01

## 2023-11-01 PROBLEM — E66.9 OBESITY (BMI 30.0-34.9): Status: RESOLVED | Noted: 2018-09-07 | Resolved: 2023-11-01

## 2023-11-01 PROCEDURE — G8484 FLU IMMUNIZE NO ADMIN: HCPCS | Performed by: INTERNAL MEDICINE

## 2023-11-01 PROCEDURE — 0509F URINE INCON PLAN DOCD: CPT | Performed by: INTERNAL MEDICINE

## 2023-11-01 PROCEDURE — G8428 CUR MEDS NOT DOCUMENT: HCPCS | Performed by: INTERNAL MEDICINE

## 2023-11-01 PROCEDURE — G8419 CALC BMI OUT NRM PARAM NOF/U: HCPCS | Performed by: INTERNAL MEDICINE

## 2023-11-01 PROCEDURE — 90694 VACC AIIV4 NO PRSRV 0.5ML IM: CPT | Performed by: INTERNAL MEDICINE

## 2023-11-01 PROCEDURE — 99214 OFFICE O/P EST MOD 30 MIN: CPT | Performed by: INTERNAL MEDICINE

## 2023-11-01 PROCEDURE — 1090F PRES/ABSN URINE INCON ASSESS: CPT | Performed by: INTERNAL MEDICINE

## 2023-11-01 PROCEDURE — PBSHW INFLUENZA, FLUAD, (AGE 65 Y+), IM, PF, 0.5 ML: Performed by: INTERNAL MEDICINE

## 2023-11-01 PROCEDURE — 1123F ACP DISCUSS/DSCN MKR DOCD: CPT | Performed by: INTERNAL MEDICINE

## 2023-11-01 PROCEDURE — 1036F TOBACCO NON-USER: CPT | Performed by: INTERNAL MEDICINE

## 2023-11-01 RX ORDER — ALBUTEROL SULFATE 90 UG/1
2 AEROSOL, METERED RESPIRATORY (INHALATION) EVERY 6 HOURS PRN
Qty: 18 G | Refills: 3 | Status: SHIPPED | OUTPATIENT
Start: 2023-11-01

## 2023-11-01 RX ORDER — TORSEMIDE 10 MG/1
10 TABLET ORAL EVERY OTHER DAY
Qty: 90 TABLET | Refills: 3
Start: 2023-11-01

## 2023-11-01 RX ORDER — AZELASTINE 1 MG/ML
2 SPRAY, METERED NASAL 2 TIMES DAILY
Qty: 120 ML | Refills: 3 | Status: SHIPPED | OUTPATIENT
Start: 2023-11-01

## 2023-11-01 RX ORDER — TORSEMIDE 10 MG/1
10 TABLET ORAL DAILY PRN
Qty: 90 TABLET | Refills: 3
Start: 2023-11-01 | End: 2023-11-01

## 2023-11-01 NOTE — PROGRESS NOTES
MG TB24 Take 50 mg by mouth daily Replaces tolterodine due to dry mouth, blurry vison    albuterol sulfate HFA (PROVENTIL HFA) 108 (90 Base) MCG/ACT inhaler Inhale 2 puffs into the lungs every 6 hours as needed for Wheezing    torsemide (DEMADEX) 10 MG tablet Take 1 tablet by mouth every other day    SYNTHROID 112 MCG tablet TAKE 1 TABLET BY MOUTH EVERY DAY BEFORE BREAKFAST    metoprolol succinate (TOPROL XL) 50 MG extended release tablet TAKE 1 TABLET BY MOUTH EVERY DAY    apixaban (ELIQUIS) 2.5 MG TABS tablet Take 1 tablet by mouth 2 times daily    acetaminophen (TYLENOL) 650 MG extended release tablet Take 1 tablet by mouth in the morning and 1 tablet at noon and 1 tablet in the evening. vitamin D 25 MCG (1000 UT) CAPS Take 1 capsule by mouth daily    acetaminophen (TYLENOL) 325 MG tablet Take 2 tablets by mouth in the morning and 2 tablets in the evening. vitamin D (CHOLECALCIFEROL) 25 MCG (1000 UT) TABS tablet Take 1 tablet by mouth daily    Multiple Vitamins-Minerals (PRESERVISION AREDS 2 PO) Take by mouth    dextromethorphan-guaiFENesin (MUCINEX DM)  MG per extended release tablet Take 1 tablet by mouth every 12 hours as needed     No current facility-administered medications for this visit.

## 2023-11-02 LAB
ANION GAP SERPL CALC-SCNC: 10 MMOL/L (ref 5–15)
BUN SERPL-MCNC: 52 MG/DL (ref 6–20)
BUN/CREAT SERPL: 26 (ref 12–20)
CALCIUM SERPL-MCNC: 8.9 MG/DL (ref 8.5–10.1)
CHLORIDE SERPL-SCNC: 106 MMOL/L (ref 97–108)
CO2 SERPL-SCNC: 24 MMOL/L (ref 21–32)
CREAT SERPL-MCNC: 2.01 MG/DL (ref 0.55–1.02)
ERYTHROCYTE [DISTWIDTH] IN BLOOD BY AUTOMATED COUNT: 14.1 % (ref 11.5–14.5)
GLUCOSE SERPL-MCNC: 93 MG/DL (ref 65–100)
HCT VFR BLD AUTO: 42.3 % (ref 35–47)
HGB BLD-MCNC: 12.9 G/DL (ref 11.5–16)
MCH RBC QN AUTO: 30.1 PG (ref 26–34)
MCHC RBC AUTO-ENTMCNC: 30.5 G/DL (ref 30–36.5)
MCV RBC AUTO: 98.8 FL (ref 80–99)
NRBC # BLD: 0 K/UL (ref 0–0.01)
NRBC BLD-RTO: 0 PER 100 WBC
PLATELET # BLD AUTO: 245 K/UL (ref 150–400)
PMV BLD AUTO: 10.6 FL (ref 8.9–12.9)
POTASSIUM SERPL-SCNC: 5.1 MMOL/L (ref 3.5–5.1)
RBC # BLD AUTO: 4.28 M/UL (ref 3.8–5.2)
SODIUM SERPL-SCNC: 140 MMOL/L (ref 136–145)
T4 FREE SERPL-MCNC: 1.5 NG/DL (ref 0.8–1.5)
TSH SERPL DL<=0.05 MIU/L-ACNC: 0.37 UIU/ML (ref 0.36–3.74)
WBC # BLD AUTO: 7.5 K/UL (ref 3.6–11)

## 2023-11-03 ENCOUNTER — TELEPHONE (OUTPATIENT)
Age: 88
End: 2023-11-03

## 2023-11-03 DIAGNOSIS — E03.9 ACQUIRED HYPOTHYROIDISM: Primary | ICD-10-CM

## 2023-11-03 RX ORDER — LEVOTHYROXINE SODIUM 100 MCG
100 TABLET ORAL DAILY
Qty: 90 TABLET | Refills: 1 | Status: SHIPPED | OUTPATIENT
Start: 2023-11-03

## 2023-11-03 RX ORDER — LEVOTHYROXINE SODIUM 0.1 MG/1
100 TABLET ORAL DAILY
Qty: 90 TABLET | Refills: 1 | Status: SHIPPED | OUTPATIENT
Start: 2023-11-03 | End: 2023-11-03

## 2023-11-03 NOTE — TELEPHONE ENCOUNTER
Spoke with patient and updated on Dr. Elizabet Charles comments, recommendations in medication  changes, follow up appt with labs. She states understanding and asks if   CHASTITY LITTLE can call in the decrease in her Synthroid to 100 mcg. She scheduled an appt for 2/8/24 at 1:20 PM.  Grateful for the call.

## 2023-11-03 NOTE — TELEPHONE ENCOUNTER
Spoke with patient and she asked to clarify if Dr. Isidro Dennis will be sending in the Levothyroxine to her [facility] on [address]. Advised that he will send it there. Grateful for the call.

## 2023-11-03 NOTE — TELEPHONE ENCOUNTER
----- Message from Prem Crespo MD sent at 11/3/2023  9:25 AM EDT -----  Kris Basurto,   Please let patient know that her kidney function is a little bit worse, showing signs of dehydration. She is taking Bumex every other day. Please decrease to Monday and Friday only. She could also consider holding Bumex and only taking it if needed for edema. Thyroid function is borderline overactive on Synthroid 112 mcg daily. Please let her know that she can either cut the pill in half on Sundays only or I can change the dose to 100 mcg daily. Return to clinic in 3 months for an appointment to see me and to repeat your blood work.

## 2024-01-30 ENCOUNTER — TELEPHONE (OUTPATIENT)
Age: 89
End: 2024-01-30

## 2024-01-30 DIAGNOSIS — E03.9 ACQUIRED HYPOTHYROIDISM: ICD-10-CM

## 2024-01-30 RX ORDER — LEVOTHYROXINE SODIUM 100 MCG
100 TABLET ORAL DAILY
Qty: 15 TABLET | Refills: 0 | Status: SHIPPED | OUTPATIENT
Start: 2024-01-30 | End: 2024-01-30

## 2024-01-30 RX ORDER — LEVOTHYROXINE SODIUM 100 MCG
100 TABLET ORAL DAILY
Qty: 15 TABLET | Refills: 0 | Status: SHIPPED | OUTPATIENT
Start: 2024-01-30

## 2024-01-30 NOTE — TELEPHONE ENCOUNTER
Spoke with patient and she requests that Dr. Bonner call her pharmacy and tell them she only wants a 15 supply of her Levothyroxine at this time because it may be changed at her upcoming appointment on 2/8/24. Advised patient that she could just go to  her medication and request only a partial fill for 15 days. She strongly prefers that Dr. Bonner notify them she only needs 15 pills. Grateful for the call.

## 2024-02-08 ENCOUNTER — OFFICE VISIT (OUTPATIENT)
Age: 89
End: 2024-02-08
Payer: MEDICARE

## 2024-02-08 VITALS
HEIGHT: 70 IN | OXYGEN SATURATION: 91 % | DIASTOLIC BLOOD PRESSURE: 70 MMHG | RESPIRATION RATE: 17 BRPM | HEART RATE: 75 BPM | TEMPERATURE: 97.7 F | WEIGHT: 193 LBS | SYSTOLIC BLOOD PRESSURE: 107 MMHG | BODY MASS INDEX: 27.63 KG/M2

## 2024-02-08 DIAGNOSIS — I48.0 PAF (PAROXYSMAL ATRIAL FIBRILLATION) (HCC): ICD-10-CM

## 2024-02-08 DIAGNOSIS — I50.30 HEART FAILURE WITH PRESERVED EJECTION FRACTION, UNSPECIFIED HF CHRONICITY (HCC): ICD-10-CM

## 2024-02-08 DIAGNOSIS — N18.31 STAGE 3A CHRONIC KIDNEY DISEASE (HCC): ICD-10-CM

## 2024-02-08 DIAGNOSIS — J43.8 OTHER EMPHYSEMA (HCC): ICD-10-CM

## 2024-02-08 DIAGNOSIS — I50.32 CHRONIC DIASTOLIC (CONGESTIVE) HEART FAILURE (HCC): ICD-10-CM

## 2024-02-08 DIAGNOSIS — E03.8 OTHER SPECIFIED HYPOTHYROIDISM: Primary | ICD-10-CM

## 2024-02-08 DIAGNOSIS — E03.8 OTHER SPECIFIED HYPOTHYROIDISM: ICD-10-CM

## 2024-02-08 PROCEDURE — G8419 CALC BMI OUT NRM PARAM NOF/U: HCPCS | Performed by: INTERNAL MEDICINE

## 2024-02-08 PROCEDURE — G8428 CUR MEDS NOT DOCUMENT: HCPCS | Performed by: INTERNAL MEDICINE

## 2024-02-08 PROCEDURE — 3023F SPIROM DOC REV: CPT | Performed by: INTERNAL MEDICINE

## 2024-02-08 PROCEDURE — 99213 OFFICE O/P EST LOW 20 MIN: CPT | Performed by: INTERNAL MEDICINE

## 2024-02-08 PROCEDURE — 1036F TOBACCO NON-USER: CPT | Performed by: INTERNAL MEDICINE

## 2024-02-08 PROCEDURE — 1090F PRES/ABSN URINE INCON ASSESS: CPT | Performed by: INTERNAL MEDICINE

## 2024-02-08 PROCEDURE — G8484 FLU IMMUNIZE NO ADMIN: HCPCS | Performed by: INTERNAL MEDICINE

## 2024-02-08 PROCEDURE — 1123F ACP DISCUSS/DSCN MKR DOCD: CPT | Performed by: INTERNAL MEDICINE

## 2024-02-08 ASSESSMENT — PATIENT HEALTH QUESTIONNAIRE - PHQ9
1. LITTLE INTEREST OR PLEASURE IN DOING THINGS: 0
2. FEELING DOWN, DEPRESSED OR HOPELESS: 0
SUM OF ALL RESPONSES TO PHQ9 QUESTIONS 1 & 2: 0
SUM OF ALL RESPONSES TO PHQ QUESTIONS 1-9: 0

## 2024-02-08 NOTE — PROGRESS NOTES
TUNNELED CVC PLACE WO SQ PORT/PUMP > 5 YEARS (9/12/2018); Cataract removal; echo 2d adult (7/15/11); NM MYOCARDIAL SPECT REST EXERCISE OR RX (7/18/11); vas lower ext art pvr mult level seg pressures (7/18/11); Hysterectomy (1974); Colonoscopy (6/28/12); other surgical history; Cholecystectomy (09/08/2018); Tonsillectomy; and echo 2d adult (5/19/12).     reports that she quit smoking about 12 years ago. Her smoking use included cigarettes. She has never used smokeless tobacco. She reports that she does not drink alcohol and does not use drugs.    family history includes Cancer in her maternal grandmother and sister; Dementia in her mother; Heart Disease in her father and paternal grandfather; Osteoarthritis in her mother; Seizures in her sister.    Physical Exam   Nursing note and vitals reviewed.  Blood pressure 107/70, pulse 75, temperature 97.7 °F (36.5 °C), temperature source Oral, resp. rate 17, height 1.778 m (5' 10\"), weight 87.5 kg (193 lb), SpO2 91 %.  Constitutional:  No distress.    Eyes: Conjunctivae are normal.   Ears:  Hearing grossly intact  Cardiovascular: Normal rate. regular rhythm, no murmurs or gallops  No edema  Pulmonary/Chest: Effort normal.   CTAB  Musculoskeletal: moves all 4 extremities   Neurological: Alert and oriented to person, place, and time.   Skin: No visible rash noted.   Psychiatric: Normal mood and affect. Behavior is normal.   Sitting in wheelchair.      Assessment and Plan    Jennifer was seen today for thyroid problem and lab collection.    Diagnoses and all orders for this visit:    Other specified hypothyroidism  Asymptomatic on lower dose.  Repeat labs and adjust.  -     TSH; Future  -     T4, Free; Future    Stage 3a chronic kidney disease (HCC)  Taking torsemide less often.  Hopefully this will improve renal function.  -     Basic Metabolic Panel; Future    Heart failure with preserved ejection fraction, unspecified HF chronicity (HCC)  Currently appears to be well compensated

## 2024-02-09 LAB
ANION GAP SERPL CALC-SCNC: 2 MMOL/L (ref 5–15)
BUN SERPL-MCNC: 32 MG/DL (ref 6–20)
BUN/CREAT SERPL: 20 (ref 12–20)
CALCIUM SERPL-MCNC: 9.3 MG/DL (ref 8.5–10.1)
CHLORIDE SERPL-SCNC: 107 MMOL/L (ref 97–108)
CO2 SERPL-SCNC: 31 MMOL/L (ref 21–32)
CREAT SERPL-MCNC: 1.62 MG/DL (ref 0.55–1.02)
GLUCOSE SERPL-MCNC: 96 MG/DL (ref 65–100)
POTASSIUM SERPL-SCNC: 4.9 MMOL/L (ref 3.5–5.1)
SODIUM SERPL-SCNC: 140 MMOL/L (ref 136–145)
T4 FREE SERPL-MCNC: 1.3 NG/DL (ref 0.8–1.5)
TSH SERPL DL<=0.05 MIU/L-ACNC: 0.42 UIU/ML (ref 0.36–3.74)

## 2024-02-11 RX ORDER — TORSEMIDE 10 MG/1
10 TABLET ORAL
Qty: 90 TABLET | Refills: 3
Start: 2024-02-12

## 2024-02-12 ENCOUNTER — TELEPHONE (OUTPATIENT)
Age: 89
End: 2024-02-12

## 2024-02-12 DIAGNOSIS — E03.9 ACQUIRED HYPOTHYROIDISM: ICD-10-CM

## 2024-02-12 RX ORDER — LEVOTHYROXINE SODIUM 100 MCG
100 TABLET ORAL DAILY
Qty: 90 TABLET | Refills: 1 | Status: SHIPPED | OUTPATIENT
Start: 2024-02-12

## 2024-02-12 NOTE — TELEPHONE ENCOUNTER
The patient is requesting a call back to discuss medication dosage change on Synthroid. She also would like to discuss last lab results. [phone]. Please message on VM

## 2024-02-12 NOTE — TELEPHONE ENCOUNTER
Spoke with patient and reviewed Dr. Bonner's comments regarding labs and no medication changes needed. Reviewed medications. She states understanding and grateful for the call.

## 2024-03-01 NOTE — PROGRESS NOTES
Orders for Check an echo when possible   See me in one year per Dr. Ivan Alanis.   Dx: HF; edema, PA, GARCIA Fair

## 2024-03-12 ENCOUNTER — TELEPHONE (OUTPATIENT)
Age: 89
End: 2024-03-12

## 2024-03-12 NOTE — TELEPHONE ENCOUNTER
Incoming call from patient regarding concerns of left breast swelling. Patient stated that her left breast is larger then the right. Self exam completed no lumps noted in left breast by patient. No pain just swelling. Stated that she has continuing left shoulder pain that she has been treated for in the past. No numbness or tingling voiced from patient in left shoulder. Patient asking for an appointment for evaluation or referral to mammo. Please follow up with patient on home phone at [phone].

## 2024-03-15 ENCOUNTER — OFFICE VISIT (OUTPATIENT)
Age: 89
End: 2024-03-15
Payer: MEDICARE

## 2024-03-15 VITALS
HEIGHT: 70 IN | OXYGEN SATURATION: 96 % | TEMPERATURE: 98.4 F | SYSTOLIC BLOOD PRESSURE: 130 MMHG | DIASTOLIC BLOOD PRESSURE: 78 MMHG | BODY MASS INDEX: 27.2 KG/M2 | RESPIRATION RATE: 16 BRPM | HEART RATE: 73 BPM | WEIGHT: 190 LBS

## 2024-03-15 DIAGNOSIS — N64.4 MASTALGIA IN FEMALE: Primary | ICD-10-CM

## 2024-03-15 PROCEDURE — 99213 OFFICE O/P EST LOW 20 MIN: CPT | Performed by: NURSE PRACTITIONER

## 2024-03-15 PROCEDURE — 1123F ACP DISCUSS/DSCN MKR DOCD: CPT | Performed by: NURSE PRACTITIONER

## 2024-03-15 PROCEDURE — G8419 CALC BMI OUT NRM PARAM NOF/U: HCPCS | Performed by: NURSE PRACTITIONER

## 2024-03-15 PROCEDURE — 1090F PRES/ABSN URINE INCON ASSESS: CPT | Performed by: NURSE PRACTITIONER

## 2024-03-15 PROCEDURE — G8427 DOCREV CUR MEDS BY ELIG CLIN: HCPCS | Performed by: NURSE PRACTITIONER

## 2024-03-15 PROCEDURE — G8484 FLU IMMUNIZE NO ADMIN: HCPCS | Performed by: NURSE PRACTITIONER

## 2024-03-15 PROCEDURE — 1036F TOBACCO NON-USER: CPT | Performed by: NURSE PRACTITIONER

## 2024-03-15 ASSESSMENT — ENCOUNTER SYMPTOMS
EYES NEGATIVE: 1
SINUS PAIN: 0
NAUSEA: 0
BACK PAIN: 0
RESPIRATORY NEGATIVE: 1
VOMITING: 0
BLOOD IN STOOL: 0
ABDOMINAL PAIN: 0
SHORTNESS OF BREATH: 0
RHINORRHEA: 0
CHEST TIGHTNESS: 0
DIARRHEA: 0
EYE REDNESS: 0
CONSTIPATION: 0
GASTROINTESTINAL NEGATIVE: 1
COUGH: 0
EYE PAIN: 0
SINUS PRESSURE: 0

## 2024-03-15 NOTE — PROGRESS NOTES
Assessment and Plan     1. Mastalgia in female:  Diagnostic imaging studies ordered. Self breast exam recommended. Acetaminophen as needed for pain. Pt verbalized understanding and agreed with plan.  -     CARMEN BRITTANY DIGITAL DIAGNOSTIC UNILATERAL LEFT; Future  -     US BREAST COMPLETE LEFT; Future       Benefits, risks, possible drug interactions, and side effects of all new medications were reviewed with the patient. Pt verbalized understanding.    An electronic signature was used to authenticate this note.  Desi Goodman, APRN - CNP  3/16/2024      Follow-up and Dispositions    Return if symptoms worsen or fail to improve.          History of Present Illness   Chief Complaint     Jennifer Grissom is a 89 y.o. female here for had concerns including breast exam.   Mrs. Grissom presents today accompanied by friend with reports of L breast enlargement and pain, onset 6 months ago. Also reports L arm is bigger in size. Worsen in the past month. Has had a mammogram in her lifetime in 2011. Denies breast lumps, nipple discharge or skin changes. Denies personal history of breast CA. Maternal grandmother had breast CA in her 80s.       Review of Systems  Review of Systems   Constitutional: Negative.  Negative for chills, fatigue, fever and unexpected weight change.   HENT: Negative.  Negative for congestion, rhinorrhea, sinus pressure and sinus pain.    Eyes: Negative.  Negative for pain, redness and visual disturbance.   Respiratory: Negative.  Negative for cough, chest tightness and shortness of breath.    Cardiovascular: Negative.  Negative for chest pain and palpitations.   Gastrointestinal: Negative.  Negative for abdominal pain, blood in stool, constipation, diarrhea, nausea and vomiting.   Endocrine: Negative.  Negative for polydipsia, polyphagia and polyuria.   Genitourinary: Negative.  Negative for difficulty urinating, dysuria, frequency, urgency and vaginal bleeding.        L breast tenderness and enlargement

## 2024-04-11 ENCOUNTER — HOSPITAL ENCOUNTER (OUTPATIENT)
Facility: HOSPITAL | Age: 89
Discharge: HOME | End: 2024-04-11
Payer: MEDICARE

## 2024-04-11 ENCOUNTER — HOSPITAL ENCOUNTER (OUTPATIENT)
Facility: HOSPITAL | Age: 89
End: 2024-04-11
Payer: MEDICARE

## 2024-04-11 VITALS — HEIGHT: 70 IN | WEIGHT: 190 LBS | BODY MASS INDEX: 27.2 KG/M2

## 2024-04-11 DIAGNOSIS — N64.4 MASTALGIA IN FEMALE: ICD-10-CM

## 2024-04-11 PROCEDURE — G0279 TOMOSYNTHESIS, MAMMO: HCPCS

## 2024-06-10 ENCOUNTER — OFFICE VISIT (OUTPATIENT)
Age: 89
End: 2024-06-10
Payer: MEDICARE

## 2024-06-10 VITALS
DIASTOLIC BLOOD PRESSURE: 69 MMHG | HEART RATE: 69 BPM | BODY MASS INDEX: 26.6 KG/M2 | TEMPERATURE: 98 F | WEIGHT: 185.8 LBS | OXYGEN SATURATION: 92 % | HEIGHT: 70 IN | RESPIRATION RATE: 18 BRPM | SYSTOLIC BLOOD PRESSURE: 116 MMHG

## 2024-06-10 DIAGNOSIS — N18.31 STAGE 3A CHRONIC KIDNEY DISEASE (HCC): ICD-10-CM

## 2024-06-10 DIAGNOSIS — G57.93 NEUROPATHY OF BOTH FEET: ICD-10-CM

## 2024-06-10 DIAGNOSIS — E03.9 ACQUIRED HYPOTHYROIDISM: ICD-10-CM

## 2024-06-10 DIAGNOSIS — Z00.00 MEDICARE ANNUAL WELLNESS VISIT, SUBSEQUENT: Primary | ICD-10-CM

## 2024-06-10 DIAGNOSIS — R73.01 IFG (IMPAIRED FASTING GLUCOSE): ICD-10-CM

## 2024-06-10 DIAGNOSIS — I50.32 CHRONIC DIASTOLIC (CONGESTIVE) HEART FAILURE (HCC): ICD-10-CM

## 2024-06-10 DIAGNOSIS — H35.30 ARMD (AGE-RELATED MACULAR DEGENERATION), BILATERAL: ICD-10-CM

## 2024-06-10 PROCEDURE — G8427 DOCREV CUR MEDS BY ELIG CLIN: HCPCS | Performed by: INTERNAL MEDICINE

## 2024-06-10 PROCEDURE — G0439 PPPS, SUBSEQ VISIT: HCPCS | Performed by: INTERNAL MEDICINE

## 2024-06-10 PROCEDURE — 99214 OFFICE O/P EST MOD 30 MIN: CPT | Performed by: INTERNAL MEDICINE

## 2024-06-10 PROCEDURE — 1036F TOBACCO NON-USER: CPT | Performed by: INTERNAL MEDICINE

## 2024-06-10 PROCEDURE — G8419 CALC BMI OUT NRM PARAM NOF/U: HCPCS | Performed by: INTERNAL MEDICINE

## 2024-06-10 PROCEDURE — 1090F PRES/ABSN URINE INCON ASSESS: CPT | Performed by: INTERNAL MEDICINE

## 2024-06-10 PROCEDURE — 1123F ACP DISCUSS/DSCN MKR DOCD: CPT | Performed by: INTERNAL MEDICINE

## 2024-06-10 RX ORDER — TORSEMIDE 10 MG/1
10 TABLET ORAL
Qty: 90 TABLET | Refills: 3
Start: 2024-06-10

## 2024-06-10 RX ORDER — ZOSTER VACCINE RECOMBINANT, ADJUVANTED 50 MCG/0.5
0.5 KIT INTRAMUSCULAR SEE ADMIN INSTRUCTIONS
Qty: 0.5 ML | Refills: 0 | Status: SHIPPED | OUTPATIENT
Start: 2024-06-10 | End: 2024-06-10 | Stop reason: ALTCHOICE

## 2024-06-10 SDOH — ECONOMIC STABILITY: FOOD INSECURITY: WITHIN THE PAST 12 MONTHS, YOU WORRIED THAT YOUR FOOD WOULD RUN OUT BEFORE YOU GOT MONEY TO BUY MORE.: NEVER TRUE

## 2024-06-10 SDOH — ECONOMIC STABILITY: HOUSING INSECURITY
IN THE LAST 12 MONTHS, WAS THERE A TIME WHEN YOU DID NOT HAVE A STEADY PLACE TO SLEEP OR SLEPT IN A SHELTER (INCLUDING NOW)?: NO

## 2024-06-10 SDOH — ECONOMIC STABILITY: FOOD INSECURITY: WITHIN THE PAST 12 MONTHS, THE FOOD YOU BOUGHT JUST DIDN'T LAST AND YOU DIDN'T HAVE MONEY TO GET MORE.: NEVER TRUE

## 2024-06-10 SDOH — ECONOMIC STABILITY: INCOME INSECURITY: HOW HARD IS IT FOR YOU TO PAY FOR THE VERY BASICS LIKE FOOD, HOUSING, MEDICAL CARE, AND HEATING?: NOT HARD AT ALL

## 2024-06-10 ASSESSMENT — LIFESTYLE VARIABLES
HOW MANY STANDARD DRINKS CONTAINING ALCOHOL DO YOU HAVE ON A TYPICAL DAY: PATIENT DOES NOT DRINK
HOW OFTEN DO YOU HAVE A DRINK CONTAINING ALCOHOL: NEVER

## 2024-06-10 ASSESSMENT — PATIENT HEALTH QUESTIONNAIRE - PHQ9
SUM OF ALL RESPONSES TO PHQ QUESTIONS 1-9: 0
2. FEELING DOWN, DEPRESSED OR HOPELESS: NOT AT ALL
SUM OF ALL RESPONSES TO PHQ QUESTIONS 1-9: 0
SUM OF ALL RESPONSES TO PHQ QUESTIONS 1-9: 0
SUM OF ALL RESPONSES TO PHQ9 QUESTIONS 1 & 2: 0
1. LITTLE INTEREST OR PLEASURE IN DOING THINGS: NOT AT ALL
SUM OF ALL RESPONSES TO PHQ QUESTIONS 1-9: 0

## 2024-06-10 NOTE — PATIENT INSTRUCTIONS
Podiatry    [facility]  Dr. Fitch, Dr. Gardner  Phone [phone] [place] [address]  **nail trimming and care offered**           Preventing Falls: Care Instructions  Injuries and health problems such as trouble walking or poor eyesight can increase your risk of falling. So can some medicines. But there are things you can do to help prevent falls. You can exercise to get stronger. You can also arrange your home to make it safer.    Talk to your doctor about the medicines you take. Ask if any of them increase the risk of falls and whether they can be changed or stopped.   Try to exercise regularly. It can help improve your strength and balance. This can help lower your risk of falling.         Practice fall safety and prevention.   Wear low-heeled shoes that fit well and give your feet good support. Talk to your doctor if you have foot problems that make this hard.  Carry a cellphone or wear a medical alert device that you can use to call for help.  Use stepladders instead of chairs to reach high objects. Don't climb if you're at risk for falls. Ask for help, if needed.  Wear the correct eyeglasses, if you need them.        Make your home safer.   Remove rugs, cords, clutter, and furniture from walkways.  Keep your house well lit. Use night-lights in hallways and bathrooms.  Install and use sturdy handrails on stairways.  Wear nonskid footwear, even inside. Don't walk barefoot or in socks without shoes.        Be safe outside.   Use handrails, curb cuts, and ramps whenever possible.  Keep your hands free by using a shoulder bag or backpack.  Try to walk in well-lit areas. Watch out for uneven ground, changes in pavement, and debris.  Be careful in the winter. Walk on the grass or gravel when sidewalks are slippery. Use de-icer on steps and walkways. Add non-slip devices to shoes.    Put grab bars and nonskid mats in your shower or tub and near the toilet. Try to use a

## 2024-06-10 NOTE — PROGRESS NOTES
Medicare Annual Wellness Visit    Jennifer Grissom is here for Medicare AWV (Mammogram at [facility] Lab Collection (Nonfasting. ), and Numbness (Feet )    Assessment & Plan   Medicare annual wellness visit, subsequent  Recommend Shingrix and TDAP     Neuropathy of both feet  Refer to podiatry for nail trimming.    ARMD (age-related macular degeneration), bilateral  Progressive.  Follow-up with ophthalmology.  Receiving injections in eyes and positional therapy.    Acquired hypothyroidism  Largely asymptomatic.  Check thyroid.  -     T4, Free; Future  -     TSH; Future  Stage 3a chronic kidney disease (HCC)  Unclear status.  Repeat.  Avoid NSAIDs.  Taking torsemide 3 times per week.  -     CBC; Future  -     Lipid Panel; Future  -     Comprehensive Metabolic Panel; Future    IFG (impaired fasting glucose)  The patient is asked to make an attempt to improve diet and exercise patterns to aid in medical management of this problem.  -     Hemoglobin A1C; Future    Chronic diastolic (congestive) heart failure (HCC)  This condition is controlled on current medication regimen as written in medication list.  Medications refilled.  -     torsemide (DEMADEX) 10 MG tablet; Take 1 tablet by mouth three times a week, Disp-90 tablet, R-3NO PRINT  Recommendations for Preventive Services Due: see orders and patient instructions/AVS.  Recommended screening schedule for the next 5-10 years is provided to the patient in written form: see Patient Instructions/AVS.     No follow-ups on file.     Subjective   The following acute and/or chronic problems were also addressed today:    Companied by her aide, Srinivas.  Patient has family that checks on her and Srinivas shops for her.  Patient is able to prepare her own meals.    Neuropathy of bilateral feet.  Ongoing.  Has difficulty trimming her nails and she would like to see a podiatrist.    Worsening renal function. Taking torsemisde to Mon Wed Fri     Wt Readings from Last 3 Encounters:   06/10/24

## 2024-06-10 NOTE — PROGRESS NOTES
\"Have you been to the ER, urgent care clinic since your last visit?  Hospitalized since your last visit?\"    NO    “Have you seen or consulted any other health care providers outside of [facility] since your last visit?” [facility] doctor.               Click Here for Release of Records Request

## 2024-06-11 LAB
ALBUMIN SERPL-MCNC: 3.9 G/DL (ref 3.5–5)
ALBUMIN/GLOB SERPL: 1.2 (ref 1.1–2.2)
ALP SERPL-CCNC: 149 U/L (ref 45–117)
ALT SERPL-CCNC: 13 U/L (ref 12–78)
ANION GAP SERPL CALC-SCNC: 5 MMOL/L (ref 5–15)
AST SERPL-CCNC: 11 U/L (ref 15–37)
BILIRUB SERPL-MCNC: 0.4 MG/DL (ref 0.2–1)
BUN SERPL-MCNC: 32 MG/DL (ref 6–20)
BUN/CREAT SERPL: 18 (ref 12–20)
CALCIUM SERPL-MCNC: 9.9 MG/DL (ref 8.5–10.1)
CHLORIDE SERPL-SCNC: 106 MMOL/L (ref 97–108)
CHOLEST SERPL-MCNC: 199 MG/DL
CO2 SERPL-SCNC: 29 MMOL/L (ref 21–32)
CREAT SERPL-MCNC: 1.78 MG/DL (ref 0.55–1.02)
ERYTHROCYTE [DISTWIDTH] IN BLOOD BY AUTOMATED COUNT: 14.5 % (ref 11.5–14.5)
EST. AVERAGE GLUCOSE BLD GHB EST-MCNC: 94 MG/DL
GLOBULIN SER CALC-MCNC: 3.2 G/DL (ref 2–4)
GLUCOSE SERPL-MCNC: 94 MG/DL (ref 65–100)
HBA1C MFR BLD: 4.9 % (ref 4–5.6)
HCT VFR BLD AUTO: 41.6 % (ref 35–47)
HDLC SERPL-MCNC: 72 MG/DL
HDLC SERPL: 2.8 (ref 0–5)
HGB BLD-MCNC: 12.7 G/DL (ref 11.5–16)
LDLC SERPL CALC-MCNC: 96 MG/DL (ref 0–100)
MCH RBC QN AUTO: 30.8 PG (ref 26–34)
MCHC RBC AUTO-ENTMCNC: 30.5 G/DL (ref 30–36.5)
MCV RBC AUTO: 101 FL (ref 80–99)
NRBC # BLD: 0 K/UL (ref 0–0.01)
NRBC BLD-RTO: 0 PER 100 WBC
PLATELET # BLD AUTO: 211 K/UL (ref 150–400)
PMV BLD AUTO: 9.8 FL (ref 8.9–12.9)
POTASSIUM SERPL-SCNC: 5.3 MMOL/L (ref 3.5–5.1)
PROT SERPL-MCNC: 7.1 G/DL (ref 6.4–8.2)
RBC # BLD AUTO: 4.12 M/UL (ref 3.8–5.2)
SODIUM SERPL-SCNC: 140 MMOL/L (ref 136–145)
T4 FREE SERPL-MCNC: 1.3 NG/DL (ref 0.8–1.5)
TRIGL SERPL-MCNC: 155 MG/DL
TSH SERPL DL<=0.05 MIU/L-ACNC: 0.71 UIU/ML (ref 0.36–3.74)
VLDLC SERPL CALC-MCNC: 31 MG/DL
WBC # BLD AUTO: 7.3 K/UL (ref 3.6–11)

## 2024-06-20 NOTE — ACP (ADVANCE CARE PLANNING)
Patient states that a completed Advanced Medical Directive is at home. NN encouraged patient to bring a copy of the completed Advanced Medical Directive to the office for scanning into the medical record. Patient verbalized understanding & agreement. DISCHARGE

## 2024-06-24 ENCOUNTER — OFFICE VISIT (OUTPATIENT)
Age: 89
End: 2024-06-24
Payer: MEDICARE

## 2024-06-24 VITALS
OXYGEN SATURATION: 92 % | BODY MASS INDEX: 26.34 KG/M2 | DIASTOLIC BLOOD PRESSURE: 80 MMHG | SYSTOLIC BLOOD PRESSURE: 120 MMHG | HEART RATE: 75 BPM | WEIGHT: 184 LBS | HEIGHT: 70 IN

## 2024-06-24 DIAGNOSIS — G57.93 NEUROPATHY OF BOTH FEET: ICD-10-CM

## 2024-06-24 DIAGNOSIS — I48.0 PAF (PAROXYSMAL ATRIAL FIBRILLATION) (HCC): Primary | ICD-10-CM

## 2024-06-24 DIAGNOSIS — N18.31 STAGE 3A CHRONIC KIDNEY DISEASE (HCC): ICD-10-CM

## 2024-06-24 DIAGNOSIS — I50.32 CHRONIC HEART FAILURE WITH PRESERVED EJECTION FRACTION (HCC): ICD-10-CM

## 2024-06-24 PROCEDURE — G8419 CALC BMI OUT NRM PARAM NOF/U: HCPCS | Performed by: SPECIALIST

## 2024-06-24 PROCEDURE — 93005 ELECTROCARDIOGRAM TRACING: CPT | Performed by: SPECIALIST

## 2024-06-24 PROCEDURE — 99214 OFFICE O/P EST MOD 30 MIN: CPT | Performed by: SPECIALIST

## 2024-06-24 PROCEDURE — 1123F ACP DISCUSS/DSCN MKR DOCD: CPT | Performed by: SPECIALIST

## 2024-06-24 PROCEDURE — 93010 ELECTROCARDIOGRAM REPORT: CPT | Performed by: SPECIALIST

## 2024-06-24 PROCEDURE — 1036F TOBACCO NON-USER: CPT | Performed by: SPECIALIST

## 2024-06-24 PROCEDURE — 1090F PRES/ABSN URINE INCON ASSESS: CPT | Performed by: SPECIALIST

## 2024-06-24 PROCEDURE — G8427 DOCREV CUR MEDS BY ELIG CLIN: HCPCS | Performed by: SPECIALIST

## 2024-06-24 NOTE — PROGRESS NOTES
Chief Complaint   Patient presents with    Atrial Fibrillation    Congestive Heart Failure     Vitals:    06/24/24 1335   Height: 1.778 m (5' 10\")      Ht 1.778 m (5' 10\")   BMI 26.66 kg/m²

## 2024-06-24 NOTE — PROGRESS NOTES
Jovanny Zelaya MD. [facility]          Patient: Jennifer Grissom  : 1932      Today's Date: 2024        HISTORY OF PRESENT ILLNESS:     History of Present Illness:    Doing OK overall - Chronic class 2 BUENO.  No CP.  No orthopnea.    Has problems with vision loss and neuropathy as biggest complaint       PAST MEDICAL HISTORY:     Past Medical History:   Diagnosis Date    (HFpEF) heart failure with preserved ejection fraction (HCC) 2012    Dr Zelaya.  first presented  in the setting of sepsis    Anemia     hx b12 def age 20-30s    ARMD (age-related macular degeneration), bilateral     wet.  Dr. Mckinley    Atrial fibrillation (HCC)     Dr Zelaya    Cholecystitis 2018    COPD (chronic obstructive pulmonary disease) ([facility]) 2011    FEV1 1 L 2011. Dr. Light    COVID-19 vaccination declined     GERD (gastroesophageal reflux disease)     Hemorrhoids     Migraine     Mixed stress and urge urinary incontinence     Neuropathy of both feet 2019    consult Dr. Ramon.      OA (osteoarthritis) of knee     R, Dr. Hadfield.  Euflexa injections x3. has walker    Obesity (BMI 30.0-34.9) 2018    Osteoporosis     declines medication tx    Other postablative hypothyroidism     LEONARDO   saw Dr. Pinto    Psoriasis     left ear    PSVT (paroxysmal supraventricular tachycardia) ([facility]) 2012    Dr. Zelaya    Pulmonary nodules 2011    RLL x2.  stable 2012  Dr. 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 Mckinley, bilaterally    CHOLECYSTECTOMY  2018    COLONOSCOPY  12    2 polyps. Dr. Paredes    ECHO 2D ADULT  7/15/11    mild LVH, EF 65%, normal atrial sizes, indeterminate diastolic function, no sig valvular disease, RVSP 41    ECHO 2D ADULT  12    mild LVH, EF 55-60%     HYSTERECTOMY (CERVIX STATUS UNKNOWN)

## 2024-07-30 RX ORDER — APIXABAN 2.5 MG/1
2.5 TABLET, FILM COATED ORAL 2 TIMES DAILY
Qty: 180 TABLET | Refills: 3 | Status: SHIPPED | OUTPATIENT
Start: 2024-07-30

## 2024-07-30 NOTE — TELEPHONE ENCOUNTER
Refill per VO of Dr. Zelaya  Last appt: 6/24/2024    Future Appointments   Date Time Provider Department Center   9/11/2024  2:20 PM Larry Bonner MD [facility] BS AMB   6/26/2025  2:00 PM [facility]       Requested Prescriptions     Signed Prescriptions Disp Refills    ELIQUIS 2.5 MG TABS tablet 180 tablet 3     Sig: TAKE 1 TABLET BY MOUTH TWICE A DAY     Authorizing Provider: JOVANNY ZELAYA     Ordering User: YANCI KIM

## 2024-07-31 RX ORDER — METOPROLOL SUCCINATE 50 MG/1
TABLET, EXTENDED RELEASE ORAL
Qty: 90 TABLET | Refills: 3 | Status: SHIPPED | OUTPATIENT
Start: 2024-07-31

## 2024-07-31 NOTE — TELEPHONE ENCOUNTER
Refill per VO of Dr. Jovanny Zelaya  Last appt: 6/24/2024    Future Appointments   Date Time Provider Department Center   9/11/2024  2:20 PM Larry Bonner MD [facility] BS AMB   6/26/2025  2:00 PM [facility]       Requested Prescriptions     Signed Prescriptions Disp Refills    metoprolol succinate (TOPROL XL) 50 MG extended release tablet 90 tablet 3     Sig: TAKE 1 TABLET BY MOUTH EVERY DAY     Authorizing Provider: JOVANNY ZELAYA     Ordering User: YANCI KIM

## 2024-09-11 ENCOUNTER — OFFICE VISIT (OUTPATIENT)
Age: 89
End: 2024-09-11
Payer: MEDICARE

## 2024-09-11 VITALS
RESPIRATION RATE: 18 BRPM | BODY MASS INDEX: 26.14 KG/M2 | HEART RATE: 65 BPM | OXYGEN SATURATION: 90 % | SYSTOLIC BLOOD PRESSURE: 107 MMHG | DIASTOLIC BLOOD PRESSURE: 62 MMHG | WEIGHT: 182.6 LBS | TEMPERATURE: 98.1 F | HEIGHT: 70 IN

## 2024-09-11 DIAGNOSIS — R74.8 ELEVATED ALKALINE PHOSPHATASE LEVEL: Primary | ICD-10-CM

## 2024-09-11 DIAGNOSIS — Z90.49 S/P CHOLECYSTECTOMY: ICD-10-CM

## 2024-09-11 DIAGNOSIS — Z23 NEEDS FLU SHOT: ICD-10-CM

## 2024-09-11 DIAGNOSIS — N18.31 STAGE 3A CHRONIC KIDNEY DISEASE (HCC): ICD-10-CM

## 2024-09-11 DIAGNOSIS — R74.8 ELEVATED ALKALINE PHOSPHATASE LEVEL: ICD-10-CM

## 2024-09-11 PROCEDURE — PBSHW INFLUENZA, FLUAD TRIVALENT, (AGE 65 Y+), IM, PRESERVATIVE FREE, 0.5ML: Performed by: INTERNAL MEDICINE

## 2024-09-11 PROCEDURE — G8419 CALC BMI OUT NRM PARAM NOF/U: HCPCS | Performed by: INTERNAL MEDICINE

## 2024-09-11 PROCEDURE — 99213 OFFICE O/P EST LOW 20 MIN: CPT | Performed by: INTERNAL MEDICINE

## 2024-09-11 PROCEDURE — 90653 IIV ADJUVANT VACCINE IM: CPT | Performed by: INTERNAL MEDICINE

## 2024-09-11 PROCEDURE — 1036F TOBACCO NON-USER: CPT | Performed by: INTERNAL MEDICINE

## 2024-09-11 PROCEDURE — 1123F ACP DISCUSS/DSCN MKR DOCD: CPT | Performed by: INTERNAL MEDICINE

## 2024-09-11 PROCEDURE — G8427 DOCREV CUR MEDS BY ELIG CLIN: HCPCS | Performed by: INTERNAL MEDICINE

## 2024-09-11 PROCEDURE — 1090F PRES/ABSN URINE INCON ASSESS: CPT | Performed by: INTERNAL MEDICINE

## 2024-09-12 LAB
ALBUMIN SERPL-MCNC: 3.9 G/DL (ref 3.5–5)
ALBUMIN/GLOB SERPL: 1.3 (ref 1.1–2.2)
ALP SERPL-CCNC: 168 U/L (ref 45–117)
ALT SERPL-CCNC: 16 U/L (ref 12–78)
ANION GAP SERPL CALC-SCNC: ABNORMAL MMOL/L (ref 2–12)
APPEARANCE UR: ABNORMAL
AST SERPL-CCNC: 11 U/L (ref 15–37)
BACTERIA URNS QL MICRO: ABNORMAL /HPF
BILIRUB SERPL-MCNC: 0.4 MG/DL (ref 0.2–1)
BILIRUB UR QL: NEGATIVE
BUN SERPL-MCNC: 33 MG/DL (ref 6–20)
BUN/CREAT SERPL: 20 (ref 12–20)
CALCIUM SERPL-MCNC: 9.8 MG/DL (ref 8.5–10.1)
CHLORIDE SERPL-SCNC: 106 MMOL/L (ref 97–108)
CO2 SERPL-SCNC: 37 MMOL/L (ref 21–32)
COLOR UR: ABNORMAL
CREAT SERPL-MCNC: 1.64 MG/DL (ref 0.55–1.02)
CREAT UR-MCNC: 54.7 MG/DL
EPITH CASTS URNS QL MICRO: ABNORMAL /LPF
GGT SERPL-CCNC: 15 U/L (ref 5–55)
GLOBULIN SER CALC-MCNC: 3 G/DL (ref 2–4)
GLUCOSE SERPL-MCNC: 94 MG/DL (ref 65–100)
GLUCOSE UR STRIP.AUTO-MCNC: NEGATIVE MG/DL
HGB UR QL STRIP: NEGATIVE
HYALINE CASTS URNS QL MICRO: ABNORMAL /LPF (ref 0–5)
KETONES UR QL STRIP.AUTO: NEGATIVE MG/DL
LEUKOCYTE ESTERASE UR QL STRIP.AUTO: ABNORMAL
MICROALBUMIN UR-MCNC: 2.48 MG/DL
MICROALBUMIN/CREAT UR-RTO: 45 MG/G (ref 0–30)
NITRITE UR QL STRIP.AUTO: NEGATIVE
PH UR STRIP: 5.5 (ref 5–8)
POTASSIUM SERPL-SCNC: 5.2 MMOL/L (ref 3.5–5.1)
PROT SERPL-MCNC: 6.9 G/DL (ref 6.4–8.2)
PROT UR STRIP-MCNC: NEGATIVE MG/DL
RBC #/AREA URNS HPF: ABNORMAL /HPF (ref 0–5)
SODIUM SERPL-SCNC: 141 MMOL/L (ref 136–145)
SP GR UR REFRACTOMETRY: 1.01 (ref 1–1.03)
UROBILINOGEN UR QL STRIP.AUTO: 0.2 EU/DL (ref 0.2–1)
WBC URNS QL MICRO: ABNORMAL /HPF (ref 0–4)

## 2024-09-17 DIAGNOSIS — R74.8 ELEVATED ALKALINE PHOSPHATASE LEVEL: Primary | ICD-10-CM

## 2024-09-26 ENCOUNTER — TELEPHONE (OUTPATIENT)
Age: 89
End: 2024-09-26

## 2024-10-01 ENCOUNTER — HOSPITAL ENCOUNTER (OUTPATIENT)
Facility: HOSPITAL | Age: 89
Discharge: HOME | End: 2024-10-04
Attending: INTERNAL MEDICINE
Payer: MEDICARE

## 2024-10-01 DIAGNOSIS — R74.8 ELEVATED ALKALINE PHOSPHATASE LEVEL: ICD-10-CM

## 2024-10-01 PROCEDURE — 76705 ECHO EXAM OF ABDOMEN: CPT

## 2024-10-02 DIAGNOSIS — I50.32 CHRONIC DIASTOLIC (CONGESTIVE) HEART FAILURE (HCC): ICD-10-CM

## 2024-10-02 RX ORDER — TORSEMIDE 10 MG/1
10 TABLET ORAL
Qty: 36 TABLET | Refills: 3 | Status: SHIPPED | OUTPATIENT
Start: 2024-10-02

## 2025-01-15 DIAGNOSIS — E03.9 ACQUIRED HYPOTHYROIDISM: ICD-10-CM

## 2025-01-15 RX ORDER — LEVOTHYROXINE SODIUM 100 MCG
100 TABLET ORAL DAILY
Qty: 90 TABLET | Refills: 1 | Status: SHIPPED | OUTPATIENT
Start: 2025-01-15

## 2025-01-16 RX ORDER — ALBUTEROL SULFATE 90 UG/1
INHALANT RESPIRATORY (INHALATION)
Qty: 18 EACH | Refills: 3 | Status: SHIPPED | OUTPATIENT
Start: 2025-01-16

## 2025-03-24 ENCOUNTER — OFFICE VISIT (OUTPATIENT)
Facility: CLINIC | Age: 89
End: 2025-03-24
Payer: MEDICARE

## 2025-03-24 VITALS
WEIGHT: 169.4 LBS | HEIGHT: 70 IN | DIASTOLIC BLOOD PRESSURE: 65 MMHG | SYSTOLIC BLOOD PRESSURE: 103 MMHG | HEART RATE: 70 BPM | TEMPERATURE: 97.6 F | BODY MASS INDEX: 24.25 KG/M2 | RESPIRATION RATE: 16 BRPM

## 2025-03-24 DIAGNOSIS — R74.8 ELEVATED ALKALINE PHOSPHATASE LEVEL: ICD-10-CM

## 2025-03-24 DIAGNOSIS — E03.9 ACQUIRED HYPOTHYROIDISM: ICD-10-CM

## 2025-03-24 DIAGNOSIS — I50.32 CHRONIC HEART FAILURE WITH PRESERVED EJECTION FRACTION (HCC): ICD-10-CM

## 2025-03-24 DIAGNOSIS — I48.0 PAF (PAROXYSMAL ATRIAL FIBRILLATION) (HCC): ICD-10-CM

## 2025-03-24 DIAGNOSIS — R63.4 WEIGHT LOSS: ICD-10-CM

## 2025-03-24 DIAGNOSIS — N18.31 STAGE 3A CHRONIC KIDNEY DISEASE (HCC): ICD-10-CM

## 2025-03-24 DIAGNOSIS — E03.9 ACQUIRED HYPOTHYROIDISM: Primary | ICD-10-CM

## 2025-03-24 DIAGNOSIS — L65.9 HAIR LOSS: ICD-10-CM

## 2025-03-24 DIAGNOSIS — E61.1 IRON DEFICIENCY: ICD-10-CM

## 2025-03-24 DIAGNOSIS — G57.93 NEUROPATHY OF BOTH FEET: ICD-10-CM

## 2025-03-24 DIAGNOSIS — J44.9 CHRONIC OBSTRUCTIVE PULMONARY DISEASE, UNSPECIFIED COPD TYPE (HCC): ICD-10-CM

## 2025-03-24 PROCEDURE — 1159F MED LIST DOCD IN RCRD: CPT | Performed by: INTERNAL MEDICINE

## 2025-03-24 PROCEDURE — 99214 OFFICE O/P EST MOD 30 MIN: CPT | Performed by: INTERNAL MEDICINE

## 2025-03-24 PROCEDURE — 1090F PRES/ABSN URINE INCON ASSESS: CPT | Performed by: INTERNAL MEDICINE

## 2025-03-24 PROCEDURE — G8427 DOCREV CUR MEDS BY ELIG CLIN: HCPCS | Performed by: INTERNAL MEDICINE

## 2025-03-24 PROCEDURE — G8420 CALC BMI NORM PARAMETERS: HCPCS | Performed by: INTERNAL MEDICINE

## 2025-03-24 PROCEDURE — 3023F SPIROM DOC REV: CPT | Performed by: INTERNAL MEDICINE

## 2025-03-24 PROCEDURE — 1036F TOBACCO NON-USER: CPT | Performed by: INTERNAL MEDICINE

## 2025-03-24 PROCEDURE — 1160F RVW MEDS BY RX/DR IN RCRD: CPT | Performed by: INTERNAL MEDICINE

## 2025-03-24 PROCEDURE — 1123F ACP DISCUSS/DSCN MKR DOCD: CPT | Performed by: INTERNAL MEDICINE

## 2025-03-24 SDOH — ECONOMIC STABILITY: FOOD INSECURITY: WITHIN THE PAST 12 MONTHS, THE FOOD YOU BOUGHT JUST DIDN'T LAST AND YOU DIDN'T HAVE MONEY TO GET MORE.: NEVER TRUE

## 2025-03-24 SDOH — ECONOMIC STABILITY: FOOD INSECURITY: WITHIN THE PAST 12 MONTHS, YOU WORRIED THAT YOUR FOOD WOULD RUN OUT BEFORE YOU GOT MONEY TO BUY MORE.: NEVER TRUE

## 2025-03-24 ASSESSMENT — PATIENT HEALTH QUESTIONNAIRE - PHQ9
SUM OF ALL RESPONSES TO PHQ QUESTIONS 1-9: 1
1. LITTLE INTEREST OR PLEASURE IN DOING THINGS: NOT AT ALL
2. FEELING DOWN, DEPRESSED OR HOPELESS: SEVERAL DAYS

## 2025-03-24 NOTE — PROGRESS NOTES
HISTORY OF PRESENT ILLNESS    Chief Complaint   Patient presents with    Follow-up    lab work     Patient states she is here for routine blood work.          History of Present Illness  The patient is a 89-year-old woman who presents for a follow-up of multiple medical issues including hypothyroidism, hypertension, neuropathy, weight loss, hair loss, atrial fibrillation, congestive heart failure, and COPD.    She is accompanied by her aide, Srinivas    Intermittent shortness of breath is reported. Oxygen saturation levels have been observed to fluctuate, typically hovers around 93.  COPD with intermittent use of inhalers.    Neuropathy is perceived as the primary impediment to her overall well-being. She has previously consulted with a neurology clinic but has not returned for further evaluation. Cold feet necessitate the use of socks, and a sensation akin to someone shaking her big toe disrupts sleep. She has recently consulted with a neurologist. A foot massager is utilized as part of her treatment regimen. An updated version of the foot massager has been ordered, as the previous one ceased functioning. The device is designed to automatically shut off every 15 minutes, but it currently deactivates every 15 seconds. Diabetes is not present. B12 supplements have been advised, despite normal B12 levels.    A weight loss of 20 pounds since the last visit is reported. Dietary habits include consuming one meal per day, supplemented by frequent snacking. A decreased appetite in the morning is admitted, often waking up late and skipping breakfast. The diet includes two cups of coffee daily, salads, russo, sausage, chocolate, candy, mixed nuts, mashed potatoes, greens, and cornbread.  Wt Readings from Last 3 Encounters:   03/24/25 76.8 kg (169 lb 6.4 oz)   09/11/24 82.8 kg (182 lb 9.6 oz)   06/24/24 83.5 kg (184 lb)     Hair loss is reported, suspected to be related to her thyroid condition. Synthroid has been prescribed but

## 2025-03-24 NOTE — PROGRESS NOTES
Room 8     Identified pt with two pt identifiers(name and ). Reviewed record in preparation for visit and have obtained necessary documentation. All patient medications has been reviewed.  Chief Complaint   Patient presents with    Follow-up    lab work     Patient states she is here for routine blood work.          Health Maintenance Due   Topic    DTaP/Tdap/Td vaccine (1 - Tdap)     Health Maintenance Review: Patient reminded of \"due or due soon\" health maintenance. I have asked the patient to contact his/her primary care provider (PCP) for follow-up on his/her health maintenance.        Wt Readings from Last 3 Encounters:   24 82.8 kg (182 lb 9.6 oz)   24 83.5 kg (184 lb)   06/10/24 84.3 kg (185 lb 12.8 oz)     Temp Readings from Last 3 Encounters:   25 97.6 °F (36.4 °C) (Temporal)   24 98.1 °F (36.7 °C) (Oral)   06/10/24 98 °F (36.7 °C) (Oral)     BP Readings from Last 3 Encounters:   25 103/65   24 107/62   24 120/80     Pulse Readings from Last 3 Encounters:   25 70   24 65   24 75       Vitals:    25 1404   BP: 103/65   Pulse: 70   Resp: 16   Temp: 97.6 °F (36.4 °C)        1. \"Have you been to the ER, urgent care clinic since your last visit?  Hospitalized since your last visit?\" No    2. \"Have you seen or consulted any other health care providers outside of the [facility] since your last visit?\" No     3. For patients aged 45-75: Has the patient had a colonoscopy / FIT/ Cologuard? NA - based on age      If the patient is female:    4. For patients aged 40-74: Has the patient had a mammogram within the past 2 years? NA - based on age or sex      5. For patients aged 21-65: Has the patient had a pap smear? NA - based on age or sex

## 2025-03-25 ENCOUNTER — RESULTS FOLLOW-UP (OUTPATIENT)
Facility: CLINIC | Age: 89
End: 2025-03-25

## 2025-03-25 DIAGNOSIS — N18.32 STAGE 3B CHRONIC KIDNEY DISEASE (HCC): Primary | ICD-10-CM

## 2025-03-25 LAB
ALBUMIN SERPL-MCNC: 3.8 G/DL (ref 3.5–5)
ALBUMIN/GLOB SERPL: 1.2 (ref 1.1–2.2)
ALP SERPL-CCNC: 148 U/L (ref 45–117)
ALT SERPL-CCNC: 15 U/L (ref 12–78)
ANION GAP SERPL CALC-SCNC: 7 MMOL/L (ref 2–12)
AST SERPL-CCNC: 11 U/L (ref 15–37)
BASOPHILS # BLD: 0.1 K/UL (ref 0–0.1)
BASOPHILS NFR BLD: 1.3 % (ref 0–1)
BILIRUB SERPL-MCNC: 0.3 MG/DL (ref 0.2–1)
BUN SERPL-MCNC: 44 MG/DL (ref 6–20)
BUN/CREAT SERPL: 19 (ref 12–20)
CALCIUM SERPL-MCNC: 9.8 MG/DL (ref 8.5–10.1)
CHLORIDE SERPL-SCNC: 105 MMOL/L (ref 97–108)
CO2 SERPL-SCNC: 28 MMOL/L (ref 21–32)
CREAT SERPL-MCNC: 2.28 MG/DL (ref 0.55–1.02)
DIFFERENTIAL METHOD BLD: ABNORMAL
EOSINOPHIL # BLD: 0.92 K/UL (ref 0–0.4)
EOSINOPHIL NFR BLD: 11.8 % (ref 0–7)
ERYTHROCYTE [DISTWIDTH] IN BLOOD BY AUTOMATED COUNT: 15 % (ref 11.5–14.5)
FERRITIN SERPL-MCNC: 62 NG/ML (ref 26–388)
GLOBULIN SER CALC-MCNC: 3.2 G/DL (ref 2–4)
GLUCOSE SERPL-MCNC: 93 MG/DL (ref 65–100)
HCT VFR BLD AUTO: 41.3 % (ref 35–47)
HGB BLD-MCNC: 12.6 G/DL (ref 11.5–16)
IMM GRANULOCYTES # BLD AUTO: 0.02 K/UL (ref 0–0.04)
IMM GRANULOCYTES NFR BLD AUTO: 0.3 % (ref 0–0.5)
LYMPHOCYTES # BLD: 0.96 K/UL (ref 0.8–3.5)
LYMPHOCYTES NFR BLD: 12.3 % (ref 12–49)
MCH RBC QN AUTO: 31 PG (ref 26–34)
MCHC RBC AUTO-ENTMCNC: 30.5 G/DL (ref 30–36.5)
MCV RBC AUTO: 101.5 FL (ref 80–99)
MONOCYTES # BLD: 0.66 K/UL (ref 0–1)
MONOCYTES NFR BLD: 8.4 % (ref 5–13)
NEUTS SEG # BLD: 5.16 K/UL (ref 1.8–8)
NEUTS SEG NFR BLD: 65.9 % (ref 32–75)
NRBC # BLD: 0 K/UL (ref 0–0.01)
NRBC BLD-RTO: 0 PER 100 WBC
PLATELET # BLD AUTO: 236 K/UL (ref 150–400)
PMV BLD AUTO: 10.2 FL (ref 8.9–12.9)
POTASSIUM SERPL-SCNC: 4.8 MMOL/L (ref 3.5–5.1)
PROT SERPL-MCNC: 7 G/DL (ref 6.4–8.2)
RBC # BLD AUTO: 4.07 M/UL (ref 3.8–5.2)
SODIUM SERPL-SCNC: 140 MMOL/L (ref 136–145)
T4 FREE SERPL-MCNC: 1.3 NG/DL (ref 0.8–1.5)
TSH SERPL DL<=0.05 MIU/L-ACNC: 0.5 UIU/ML (ref 0.36–3.74)
WBC # BLD AUTO: 7.8 K/UL (ref 3.6–11)

## 2025-04-18 DIAGNOSIS — E03.9 ACQUIRED HYPOTHYROIDISM: ICD-10-CM

## 2025-04-18 RX ORDER — LEVOTHYROXINE SODIUM 100 MCG
100 TABLET ORAL DAILY
Qty: 90 TABLET | Refills: 1 | Status: SHIPPED | OUTPATIENT
Start: 2025-04-18

## 2025-04-25 ENCOUNTER — OFFICE VISIT (OUTPATIENT)
Facility: CLINIC | Age: 89
End: 2025-04-25
Payer: MEDICARE

## 2025-04-25 VITALS
TEMPERATURE: 97.5 F | BODY MASS INDEX: 23.48 KG/M2 | WEIGHT: 164 LBS | HEIGHT: 70 IN | OXYGEN SATURATION: 98 % | SYSTOLIC BLOOD PRESSURE: 114 MMHG | HEART RATE: 72 BPM | DIASTOLIC BLOOD PRESSURE: 78 MMHG

## 2025-04-25 DIAGNOSIS — R25.1 TREMOR: ICD-10-CM

## 2025-04-25 DIAGNOSIS — R89.8 EOSINOPHILS INCREASED: Primary | ICD-10-CM

## 2025-04-25 DIAGNOSIS — N18.30 STAGE 3 CHRONIC KIDNEY DISEASE, UNSPECIFIED WHETHER STAGE 3A OR 3B CKD (HCC): ICD-10-CM

## 2025-04-25 DIAGNOSIS — E03.9 ACQUIRED HYPOTHYROIDISM: ICD-10-CM

## 2025-04-25 DIAGNOSIS — I50.32 CHRONIC DIASTOLIC (CONGESTIVE) HEART FAILURE (HCC): ICD-10-CM

## 2025-04-25 DIAGNOSIS — R89.8 EOSINOPHILS INCREASED: ICD-10-CM

## 2025-04-25 DIAGNOSIS — R79.89 ELEVATED SERUM CREATININE: ICD-10-CM

## 2025-04-25 PROCEDURE — 1126F AMNT PAIN NOTED NONE PRSNT: CPT | Performed by: INTERNAL MEDICINE

## 2025-04-25 PROCEDURE — G8427 DOCREV CUR MEDS BY ELIG CLIN: HCPCS | Performed by: INTERNAL MEDICINE

## 2025-04-25 PROCEDURE — 1036F TOBACCO NON-USER: CPT | Performed by: INTERNAL MEDICINE

## 2025-04-25 PROCEDURE — 1090F PRES/ABSN URINE INCON ASSESS: CPT | Performed by: INTERNAL MEDICINE

## 2025-04-25 PROCEDURE — 1123F ACP DISCUSS/DSCN MKR DOCD: CPT | Performed by: INTERNAL MEDICINE

## 2025-04-25 PROCEDURE — 99214 OFFICE O/P EST MOD 30 MIN: CPT | Performed by: INTERNAL MEDICINE

## 2025-04-25 PROCEDURE — G8420 CALC BMI NORM PARAMETERS: HCPCS | Performed by: INTERNAL MEDICINE

## 2025-04-25 RX ORDER — LEVOTHYROXINE SODIUM 100 MCG
TABLET ORAL
Qty: 90 TABLET | Refills: 1
Start: 2025-04-25

## 2025-04-25 RX ORDER — TORSEMIDE 10 MG/1
10 TABLET ORAL
Qty: 36 TABLET | Refills: 3
Start: 2025-04-28

## 2025-04-25 ASSESSMENT — PATIENT HEALTH QUESTIONNAIRE - PHQ9
1. LITTLE INTEREST OR PLEASURE IN DOING THINGS: NOT AT ALL
SUM OF ALL RESPONSES TO PHQ QUESTIONS 1-9: 0
2. FEELING DOWN, DEPRESSED OR HOPELESS: NOT AT ALL
SUM OF ALL RESPONSES TO PHQ QUESTIONS 1-9: 0

## 2025-04-25 NOTE — PROGRESS NOTES
Identified pt with two pt identifiers(name and ). Reviewed record in preparation for visit and have obtained necessary documentation. All patient medications has been reviewed.  Chief Complaint   Patient presents with    Hypothyroidism    Follow-up     *Immunizations updated if available*    Health Maintenance Due   Topic    DTaP/Tdap/Td vaccine (1 - Tdap)     Health Maintenance Review: Patient reminded of \"due or due soon\" health maintenance. I have asked the patient to contact his/her primary care provider (PCP) for follow-up on his/her health maintenance.    Wt Readings from Last 3 Encounters:   25 74.4 kg (164 lb)   25 76.8 kg (169 lb 6.4 oz)   24 82.8 kg (182 lb 9.6 oz)     Temp Readings from Last 3 Encounters:   25 97.5 °F (36.4 °C)   25 97.6 °F (36.4 °C) (Temporal)   24 98.1 °F (36.7 °C) (Oral)     BP Readings from Last 3 Encounters:   25 114/78   25 103/65   24 107/62     Pulse Readings from Last 3 Encounters:   25 72   25 70   24 65       1. \"Have you been to the ER, urgent care clinic since your last visit?  Hospitalized since your last visit?\" No    2. \"Have you seen or consulted any other health care providers outside of the [facility] since your last visit?\"  Opthalmology      3. For patients aged 45-75: Has the patient had a colonoscopy / FIT/ Cologuard? NA - based on age    If the patient is female:    4. For patients aged 40-74: Has the patient had a mammogram within the past 2 years? NA - based on age or sex    5. For patients aged 21-65: Has the patient had a pap smear? NA - based on age or sex    Patient is accompanied by adult caretaker I have received verbal consent from Jennifer Grissom to discuss any/all medical information while they are present in the room.

## 2025-04-26 LAB
ANION GAP SERPL CALC-SCNC: 3 MMOL/L (ref 2–12)
BASOPHILS # BLD: 0.11 K/UL (ref 0–0.1)
BASOPHILS NFR BLD: 1.7 % (ref 0–1)
BUN SERPL-MCNC: 27 MG/DL (ref 6–20)
BUN/CREAT SERPL: 17 (ref 12–20)
CALCIUM SERPL-MCNC: 9.6 MG/DL (ref 8.5–10.1)
CHLORIDE SERPL-SCNC: 103 MMOL/L (ref 97–108)
CO2 SERPL-SCNC: 32 MMOL/L (ref 21–32)
CREAT SERPL-MCNC: 1.62 MG/DL (ref 0.55–1.02)
DIFFERENTIAL METHOD BLD: ABNORMAL
EOSINOPHIL # BLD: 1.08 K/UL (ref 0–0.4)
EOSINOPHIL NFR BLD: 16.4 % (ref 0–7)
ERYTHROCYTE [DISTWIDTH] IN BLOOD BY AUTOMATED COUNT: 13.9 % (ref 11.5–14.5)
GLUCOSE SERPL-MCNC: 93 MG/DL (ref 65–100)
HCT VFR BLD AUTO: 42.1 % (ref 35–47)
HGB BLD-MCNC: 12.8 G/DL (ref 11.5–16)
IMM GRANULOCYTES # BLD AUTO: 0.01 K/UL (ref 0–0.04)
IMM GRANULOCYTES NFR BLD AUTO: 0.2 % (ref 0–0.5)
LYMPHOCYTES # BLD: 1.14 K/UL (ref 0.8–3.5)
LYMPHOCYTES NFR BLD: 17.3 % (ref 12–49)
MCH RBC QN AUTO: 30.8 PG (ref 26–34)
MCHC RBC AUTO-ENTMCNC: 30.4 G/DL (ref 30–36.5)
MCV RBC AUTO: 101.4 FL (ref 80–99)
MONOCYTES # BLD: 0.54 K/UL (ref 0–1)
MONOCYTES NFR BLD: 8.2 % (ref 5–13)
NEUTS SEG # BLD: 3.71 K/UL (ref 1.8–8)
NEUTS SEG NFR BLD: 56.2 % (ref 32–75)
NRBC # BLD: 0 K/UL (ref 0–0.01)
NRBC BLD-RTO: 0 PER 100 WBC
PLATELET # BLD AUTO: 213 K/UL (ref 150–400)
PMV BLD AUTO: 10.4 FL (ref 8.9–12.9)
POTASSIUM SERPL-SCNC: 5.3 MMOL/L (ref 3.5–5.1)
RBC # BLD AUTO: 4.15 M/UL (ref 3.8–5.2)
RBC MORPH BLD: ABNORMAL
SODIUM SERPL-SCNC: 138 MMOL/L (ref 136–145)
TSH SERPL DL<=0.05 MIU/L-ACNC: 0.89 UIU/ML (ref 0.36–3.74)
WBC # BLD AUTO: 6.6 K/UL (ref 3.6–11)

## 2025-05-01 ENCOUNTER — RESULTS FOLLOW-UP (OUTPATIENT)
Facility: CLINIC | Age: 89
End: 2025-05-01

## 2025-05-07 ENCOUNTER — TELEPHONE (OUTPATIENT)
Age: 89
End: 2025-05-07

## 2025-07-16 DIAGNOSIS — E03.9 ACQUIRED HYPOTHYROIDISM: ICD-10-CM

## 2025-07-16 RX ORDER — LEVOTHYROXINE SODIUM 100 MCG
TABLET ORAL
Qty: 90 TABLET | Refills: 1 | Status: SHIPPED | OUTPATIENT
Start: 2025-07-16

## 2025-07-23 RX ORDER — APIXABAN 2.5 MG/1
2.5 TABLET, FILM COATED ORAL 2 TIMES DAILY
Qty: 180 TABLET | Refills: 3 | Status: SHIPPED | OUTPATIENT
Start: 2025-07-23

## 2025-07-23 RX ORDER — METOPROLOL SUCCINATE 50 MG/1
50 TABLET, EXTENDED RELEASE ORAL DAILY
Qty: 90 TABLET | Refills: 3 | Status: SHIPPED | OUTPATIENT
Start: 2025-07-23

## 2025-08-11 ENCOUNTER — OFFICE VISIT (OUTPATIENT)
Facility: CLINIC | Age: 89
End: 2025-08-11
Payer: MEDICARE

## 2025-08-11 VITALS
HEART RATE: 66 BPM | BODY MASS INDEX: 23.34 KG/M2 | HEIGHT: 70 IN | DIASTOLIC BLOOD PRESSURE: 82 MMHG | OXYGEN SATURATION: 93 % | WEIGHT: 163 LBS | SYSTOLIC BLOOD PRESSURE: 134 MMHG | TEMPERATURE: 97.7 F | RESPIRATION RATE: 16 BRPM

## 2025-08-11 DIAGNOSIS — E03.9 ACQUIRED HYPOTHYROIDISM: Primary | ICD-10-CM

## 2025-08-11 DIAGNOSIS — I50.32 CHRONIC HEART FAILURE WITH PRESERVED EJECTION FRACTION (HCC): ICD-10-CM

## 2025-08-11 DIAGNOSIS — N18.30 STAGE 3 CHRONIC KIDNEY DISEASE, UNSPECIFIED WHETHER STAGE 3A OR 3B CKD (HCC): ICD-10-CM

## 2025-08-11 DIAGNOSIS — H35.3230 BILATERAL EXUDATIVE AGE-RELATED MACULAR DEGENERATION, UNSPECIFIED STAGE (HCC): ICD-10-CM

## 2025-08-11 DIAGNOSIS — R89.8 EOSINOPHILS INCREASED: ICD-10-CM

## 2025-08-11 DIAGNOSIS — E03.9 ACQUIRED HYPOTHYROIDISM: ICD-10-CM

## 2025-08-11 PROCEDURE — G8427 DOCREV CUR MEDS BY ELIG CLIN: HCPCS | Performed by: INTERNAL MEDICINE

## 2025-08-11 PROCEDURE — 1123F ACP DISCUSS/DSCN MKR DOCD: CPT | Performed by: INTERNAL MEDICINE

## 2025-08-11 PROCEDURE — 99214 OFFICE O/P EST MOD 30 MIN: CPT | Performed by: INTERNAL MEDICINE

## 2025-08-11 PROCEDURE — 1159F MED LIST DOCD IN RCRD: CPT | Performed by: INTERNAL MEDICINE

## 2025-08-11 PROCEDURE — 1036F TOBACCO NON-USER: CPT | Performed by: INTERNAL MEDICINE

## 2025-08-11 PROCEDURE — G8420 CALC BMI NORM PARAMETERS: HCPCS | Performed by: INTERNAL MEDICINE

## 2025-08-11 PROCEDURE — 1090F PRES/ABSN URINE INCON ASSESS: CPT | Performed by: INTERNAL MEDICINE

## 2025-08-11 PROCEDURE — 1125F AMNT PAIN NOTED PAIN PRSNT: CPT | Performed by: INTERNAL MEDICINE

## 2025-08-11 PROCEDURE — 1160F RVW MEDS BY RX/DR IN RCRD: CPT | Performed by: INTERNAL MEDICINE

## 2025-08-11 RX ORDER — LEVOTHYROXINE SODIUM 100 MCG
TABLET ORAL
Qty: 90 TABLET | Refills: 1
Start: 2025-08-11

## 2025-08-12 LAB
ANION GAP SERPL CALC-SCNC: 12 MMOL/L (ref 2–14)
BASOPHILS # BLD: 0.1 K/UL (ref 0–0.1)
BASOPHILS NFR BLD: 1.5 % (ref 0–1)
BUN SERPL-MCNC: 37 MG/DL (ref 8–23)
CALCIUM SERPL-MCNC: 9.2 MG/DL (ref 8.2–9.6)
CHLORIDE SERPL-SCNC: 102 MMOL/L (ref 98–107)
CO2 SERPL-SCNC: 25 MMOL/L (ref 20–29)
CREAT SERPL-MCNC: 1.6 MG/DL (ref 0.6–1)
DIFFERENTIAL METHOD BLD: ABNORMAL
EOSINOPHIL # BLD: 0.64 K/UL (ref 0–0.4)
EOSINOPHIL NFR BLD: 9.4 % (ref 0–7)
ERYTHROCYTE [DISTWIDTH] IN BLOOD BY AUTOMATED COUNT: 14 % (ref 11.5–14.5)
GLUCOSE SERPL-MCNC: 84 MG/DL (ref 65–100)
HCT VFR BLD AUTO: 39.5 % (ref 35–47)
HGB BLD-MCNC: 12.1 G/DL (ref 11.5–16)
IMM GRANULOCYTES # BLD AUTO: 0.01 K/UL (ref 0–0.04)
IMM GRANULOCYTES NFR BLD AUTO: 0.1 % (ref 0–0.5)
LYMPHOCYTES # BLD: 1.26 K/UL (ref 0.8–3.5)
LYMPHOCYTES NFR BLD: 18.5 % (ref 12–49)
MCH RBC QN AUTO: 31.5 PG (ref 26–34)
MCHC RBC AUTO-ENTMCNC: 30.6 G/DL (ref 30–36.5)
MCV RBC AUTO: 102.9 FL (ref 80–99)
MONOCYTES # BLD: 0.63 K/UL (ref 0–1)
MONOCYTES NFR BLD: 9.2 % (ref 5–13)
NEUTS SEG # BLD: 4.18 K/UL (ref 1.8–8)
NEUTS SEG NFR BLD: 61.3 % (ref 32–75)
NRBC # BLD: 0 K/UL (ref 0–0.01)
NRBC BLD-RTO: 0 PER 100 WBC
PLATELET # BLD AUTO: 233 K/UL (ref 150–400)
PMV BLD AUTO: 10.4 FL (ref 8.9–12.9)
POTASSIUM SERPL-SCNC: 6.3 MMOL/L (ref 3.5–5.1)
RBC # BLD AUTO: 3.84 M/UL (ref 3.8–5.2)
SODIUM SERPL-SCNC: 139 MMOL/L (ref 136–145)
TSH, 3RD GENERATION: 2.29 UIU/ML (ref 0.27–4.2)
WBC # BLD AUTO: 6.8 K/UL (ref 3.6–11)

## 2025-08-17 ENCOUNTER — RESULTS FOLLOW-UP (OUTPATIENT)
Facility: CLINIC | Age: 89
End: 2025-08-17

## 2025-08-17 DIAGNOSIS — E87.5 HYPERKALEMIA: Primary | ICD-10-CM

## 2025-08-18 ENCOUNTER — TELEPHONE (OUTPATIENT)
Facility: CLINIC | Age: 89
End: 2025-08-18

## 2025-08-19 DIAGNOSIS — E87.5 HYPERKALEMIA: ICD-10-CM

## 2025-08-20 LAB — POTASSIUM SERPL-SCNC: 5.6 MMOL/L (ref 3.5–5.1)

## (undated) DEVICE — APPLICATOR SURG XL L38CM FOR ARISTA ABSRB HEMSTAT FLEXITIP

## (undated) DEVICE — SOL IRRIGATION INJ NACL 0.9% 500ML BTL

## (undated) DEVICE — BLADELESS OPTICAL TROCAR WITH FIXATION CANNULA: Brand: VERSAONE

## (undated) DEVICE — SET CHOLANGIOGRAPHY 4FR L60CM W/ ARW KARLAN BLLN CATH CRV

## (undated) DEVICE — E-Z CLEAN, PTFE COATED, ELECTROSURGICAL LAPAROSCOPIC ELECTRODE, L-HOOK, 33 CM., SINGLE-USE, FOR USE WITH HAND CONTROL PENCIL: Brand: MEGADYNE

## (undated) DEVICE — CARTRIDGE CLP LIG HEMLOK GRN --

## (undated) DEVICE — DISPOSABLE GRASPER CARTRIDGE: Brand: DIRECT DRIVE REPOSABLE GRASPERS

## (undated) DEVICE — BLADELESS OPTICAL TROCAR WITH FIXATION CANNULA: Brand: VERSAPORT

## (undated) DEVICE — SURGICAL PROCEDURE KIT GEN LAPAROSCOPY LF

## (undated) DEVICE — TUBING FLTR PLUME AWAY EVAC W/ SUCT DEV DISP PUREVIEW

## (undated) DEVICE — SUTURE SZ 0 27IN 5/8 CIR UR-6  TAPER PT VIOLET ABSRB VICRYL J603H

## (undated) DEVICE — SUTURE MCRYL SZ 4-0 L27IN ABSRB UD L19MM PS-2 1/2 CIR PRIM Y426H

## (undated) DEVICE — DEVON™ KNEE AND BODY STRAP 60" X 3" (1.5 M X 7.6 CM): Brand: DEVON

## (undated) DEVICE — DRAPE,REIN 53X77,STERILE: Brand: MEDLINE

## (undated) DEVICE — KENDALL SCD EXPRESS SLEEVES, KNEE LENGTH, MEDIUM: Brand: KENDALL SCD

## (undated) DEVICE — STERILE POLYISOPRENE POWDER-FREE SURGICAL GLOVES: Brand: PROTEXIS

## (undated) DEVICE — DEVICE TRNSF SPIK STL 2008S] MICROTEK MEDICAL INC]

## (undated) DEVICE — LIGHT HANDLE: Brand: DEVON

## (undated) DEVICE — SPECIMEN RETRIEVAL POUCH: Brand: ENDO CATCH GOLD

## (undated) DEVICE — NEEDLE HYPO 22GA L1.5IN BLK S STL HUB POLYPR SHLD REG BVL

## (undated) DEVICE — TUBING INSUFLTN 10FT LUER -- CONVERT TO ITEM 368568

## (undated) DEVICE — CLICKLINE SCISSORS INSERT: Brand: CLICKLINE

## (undated) DEVICE — DRAPE XR C ARM 41X74IN LF --

## (undated) DEVICE — APPLICATOR BNDG 1MM ADH PREMIERPRO EXOFIN

## (undated) DEVICE — Device

## (undated) DEVICE — UNIVERSAL FIXATION CANNULA: Brand: VERSAONE

## (undated) DEVICE — AGENT HEMSTAT 3GM PURIFIED PLNT STARCH PWD ABSRB ARISTA AH

## (undated) DEVICE — SPONGE HEMOSTAT CELLULS 4X8IN -- SURGICEL

## (undated) DEVICE — TOWEL SURG W17XL27IN STD BLU COT NONFENESTRATED PREWASHED

## (undated) DEVICE — APPLIER CLP M/L SHFT DIA5MM 15 LIG LIGAMAX 5

## (undated) DEVICE — INSUFFLATION NEEDLE: Brand: SURGINEEDLE

## (undated) DEVICE — 3000CC GUARDIAN II: Brand: GUARDIAN

## (undated) DEVICE — INFECTION CONTROL KIT SYS

## (undated) DEVICE — SYRINGE MED 20ML STD CLR PLAS LUERLOCK TIP N CTRL DISP

## (undated) DEVICE — (D)PREP SKN CHLRAPRP APPL 26ML -- CONVERT TO ITEM 371833

## (undated) DEVICE — REM POLYHESIVE ADULT PATIENT RETURN ELECTRODE: Brand: VALLEYLAB